# Patient Record
Sex: FEMALE | Race: WHITE | NOT HISPANIC OR LATINO | Employment: OTHER | URBAN - METROPOLITAN AREA
[De-identification: names, ages, dates, MRNs, and addresses within clinical notes are randomized per-mention and may not be internally consistent; named-entity substitution may affect disease eponyms.]

---

## 2017-01-31 ENCOUNTER — TRANSCRIBE ORDERS (OUTPATIENT)
Dept: ADMINISTRATIVE | Facility: HOSPITAL | Age: 72
End: 2017-01-31

## 2017-01-31 DIAGNOSIS — Z12.31 VISIT FOR SCREENING MAMMOGRAM: Primary | ICD-10-CM

## 2017-03-10 ENCOUNTER — HOSPITAL ENCOUNTER (OUTPATIENT)
Dept: MAMMOGRAPHY | Facility: HOSPITAL | Age: 72
Discharge: HOME | End: 2017-03-10
Attending: INTERNAL MEDICINE | Admitting: INTERNAL MEDICINE

## 2017-03-10 DIAGNOSIS — Z12.31 VISIT FOR SCREENING MAMMOGRAM: ICD-10-CM

## 2017-03-10 PROCEDURE — 77063 BREAST TOMOSYNTHESIS BI: CPT

## 2017-03-10 PROCEDURE — G0202 SCR MAMMO BI INCL CAD: HCPCS

## 2017-03-10 PROCEDURE — 77063 BREAST TOMOSYNTHESIS BI: CPT | Performed by: RADIOLOGY

## 2017-03-10 PROCEDURE — G0202 SCR MAMMO BI INCL CAD: HCPCS | Performed by: RADIOLOGY

## 2017-11-09 ENCOUNTER — TRANSCRIBE ORDERS (OUTPATIENT)
Dept: ADMINISTRATIVE | Facility: HOSPITAL | Age: 72
End: 2017-11-09

## 2017-11-09 DIAGNOSIS — N95.9 MENOPAUSAL AND POSTMENOPAUSAL DISORDER: Primary | ICD-10-CM

## 2017-11-29 ENCOUNTER — APPOINTMENT (OUTPATIENT)
Dept: BONE DENSITY | Facility: HOSPITAL | Age: 72
End: 2017-11-29
Attending: INTERNAL MEDICINE

## 2017-12-11 ENCOUNTER — HOSPITAL ENCOUNTER (OUTPATIENT)
Dept: BONE DENSITY | Facility: HOSPITAL | Age: 72
Discharge: HOME | End: 2017-12-11
Attending: INTERNAL MEDICINE | Admitting: INTERNAL MEDICINE

## 2017-12-11 DIAGNOSIS — N95.9 MENOPAUSAL AND POSTMENOPAUSAL DISORDER: ICD-10-CM

## 2017-12-11 PROCEDURE — 77080 DXA BONE DENSITY AXIAL: CPT

## 2018-02-21 ENCOUNTER — TRANSCRIBE ORDERS (OUTPATIENT)
Dept: ADMINISTRATIVE | Facility: HOSPITAL | Age: 73
End: 2018-02-21

## 2018-02-21 DIAGNOSIS — Z12.31 VISIT FOR SCREENING MAMMOGRAM: Primary | ICD-10-CM

## 2018-03-07 ENCOUNTER — APPOINTMENT (OUTPATIENT)
Dept: MAMMOGRAPHY | Facility: HOSPITAL | Age: 73
End: 2018-03-07

## 2018-03-08 ENCOUNTER — APPOINTMENT (OUTPATIENT)
Dept: MAMMOGRAPHY | Facility: HOSPITAL | Age: 73
End: 2018-03-08

## 2018-05-10 ENCOUNTER — HOSPITAL ENCOUNTER (OUTPATIENT)
Dept: MAMMOGRAPHY | Facility: HOSPITAL | Age: 73
Discharge: HOME | End: 2018-05-10
Admitting: NURSE PRACTITIONER

## 2018-05-10 DIAGNOSIS — Z12.31 VISIT FOR SCREENING MAMMOGRAM: ICD-10-CM

## 2018-05-10 PROCEDURE — 77067 SCR MAMMO BI INCL CAD: CPT

## 2018-05-10 PROCEDURE — 77063 BREAST TOMOSYNTHESIS BI: CPT | Performed by: RADIOLOGY

## 2018-05-10 PROCEDURE — 77063 BREAST TOMOSYNTHESIS BI: CPT

## 2018-05-10 PROCEDURE — 77067 SCR MAMMO BI INCL CAD: CPT | Performed by: RADIOLOGY

## 2019-01-15 RX ORDER — LISINOPRIL AND HYDROCHLOROTHIAZIDE 25; 20 MG/1; MG/1
TABLET ORAL
Qty: 90 TABLET | Refills: 0 | Status: SHIPPED | OUTPATIENT
Start: 2019-01-15 | End: 2019-03-11 | Stop reason: SDUPTHER

## 2019-01-24 PROBLEM — N95.9 MENOPAUSAL AND POSTMENOPAUSAL DISORDER: Status: ACTIVE | Noted: 2019-01-24

## 2019-01-24 PROBLEM — M15.0 PRIMARY OSTEOARTHRITIS INVOLVING MULTIPLE JOINTS: Status: ACTIVE | Noted: 2019-01-24

## 2019-01-24 PROBLEM — K64.9 HEMORRHOIDS: Status: ACTIVE | Noted: 2019-01-24

## 2019-01-24 PROBLEM — E78.00 HYPERCHOLESTEROLEMIA: Status: ACTIVE | Noted: 2019-01-24

## 2019-01-24 PROBLEM — G89.29 CHRONIC RIGHT-SIDED THORACIC BACK PAIN: Status: ACTIVE | Noted: 2019-01-24

## 2019-01-24 PROBLEM — M15.9 PRIMARY OSTEOARTHRITIS INVOLVING MULTIPLE JOINTS: Status: ACTIVE | Noted: 2019-01-24

## 2019-01-24 PROBLEM — H35.039 HYPERTENSIVE RETINOPATHY: Status: ACTIVE | Noted: 2019-01-24

## 2019-01-24 PROBLEM — R53.83 FATIGUE: Status: ACTIVE | Noted: 2019-01-24

## 2019-01-24 PROBLEM — I10 BENIGN ESSENTIAL HYPERTENSION: Status: ACTIVE | Noted: 2019-01-24

## 2019-01-24 PROBLEM — M85.89 OSTEOPENIA OF MULTIPLE SITES: Status: ACTIVE | Noted: 2019-01-24

## 2019-01-24 PROBLEM — E66.3 OVERWEIGHT (BMI 25.0-29.9): Status: ACTIVE | Noted: 2019-01-24

## 2019-01-24 PROBLEM — M54.6 CHRONIC RIGHT-SIDED THORACIC BACK PAIN: Status: ACTIVE | Noted: 2019-01-24

## 2019-01-24 PROBLEM — E55.9 VITAMIN D DEFICIENCY: Status: ACTIVE | Noted: 2019-01-24

## 2019-03-11 RX ORDER — LISINOPRIL AND HYDROCHLOROTHIAZIDE 25; 20 MG/1; MG/1
TABLET ORAL
Qty: 90 TABLET | Refills: 0 | Status: SHIPPED | OUTPATIENT
Start: 2019-03-11 | End: 2019-07-10 | Stop reason: SDUPTHER

## 2019-03-22 RX ORDER — METOPROLOL SUCCINATE 100 MG/1
TABLET, EXTENDED RELEASE ORAL
Qty: 30 TABLET | Refills: 5 | Status: SHIPPED | OUTPATIENT
Start: 2019-03-22 | End: 2019-09-24 | Stop reason: SDUPTHER

## 2019-03-22 RX ORDER — DOXAZOSIN MESYLATE 4 MG/1
TABLET ORAL
Qty: 30 TABLET | Refills: 5 | Status: SHIPPED | OUTPATIENT
Start: 2019-03-22 | End: 2019-09-20 | Stop reason: SDUPTHER

## 2019-03-26 RX ORDER — LISINOPRIL 20 MG/1
TABLET ORAL
Qty: 90 TABLET | Refills: 1 | Status: SHIPPED | OUTPATIENT
Start: 2019-03-26 | End: 2019-10-21 | Stop reason: SDUPTHER

## 2019-04-10 RX ORDER — PRAVASTATIN SODIUM 20 MG
TABLET ORAL
Qty: 30 TABLET | Refills: 3 | Status: SHIPPED | OUTPATIENT
Start: 2019-04-10 | End: 2019-07-12 | Stop reason: SDUPTHER

## 2019-04-23 ENCOUNTER — LAB (OUTPATIENT)
Dept: LAB | Facility: HOSPITAL | Age: 74
End: 2019-04-23

## 2019-04-23 ENCOUNTER — OFFICE VISIT (OUTPATIENT)
Dept: INTERNAL MEDICINE | Facility: CLINIC | Age: 74
End: 2019-04-23

## 2019-04-23 VITALS
HEART RATE: 68 BPM | DIASTOLIC BLOOD PRESSURE: 74 MMHG | BODY MASS INDEX: 29.44 KG/M2 | SYSTOLIC BLOOD PRESSURE: 120 MMHG | HEIGHT: 62 IN | WEIGHT: 160 LBS

## 2019-04-23 DIAGNOSIS — E78.00 HYPERCHOLESTEROLEMIA: ICD-10-CM

## 2019-04-23 DIAGNOSIS — I10 BENIGN ESSENTIAL HYPERTENSION: ICD-10-CM

## 2019-04-23 DIAGNOSIS — E55.9 VITAMIN D DEFICIENCY: ICD-10-CM

## 2019-04-23 DIAGNOSIS — E66.3 OVERWEIGHT (BMI 25.0-29.9): ICD-10-CM

## 2019-04-23 DIAGNOSIS — M85.89 OSTEOPENIA OF MULTIPLE SITES: ICD-10-CM

## 2019-04-23 DIAGNOSIS — Z00.00 MEDICARE ANNUAL WELLNESS VISIT, SUBSEQUENT: Primary | ICD-10-CM

## 2019-04-23 DIAGNOSIS — K64.9 HEMORRHOIDS, UNSPECIFIED HEMORRHOID TYPE: ICD-10-CM

## 2019-04-23 DIAGNOSIS — M15.9 PRIMARY OSTEOARTHRITIS INVOLVING MULTIPLE JOINTS: ICD-10-CM

## 2019-04-23 PROBLEM — G89.29 CHRONIC RIGHT-SIDED THORACIC BACK PAIN: Status: RESOLVED | Noted: 2019-01-24 | Resolved: 2019-04-23

## 2019-04-23 PROBLEM — R53.83 FATIGUE: Status: RESOLVED | Noted: 2019-01-24 | Resolved: 2019-04-23

## 2019-04-23 PROBLEM — M54.6 CHRONIC RIGHT-SIDED THORACIC BACK PAIN: Status: RESOLVED | Noted: 2019-01-24 | Resolved: 2019-04-23

## 2019-04-23 LAB
25(OH)D3 SERPL-MCNC: 26.6 NG/ML (ref 30–100)
ALBUMIN SERPL-MCNC: 4.4 G/DL (ref 3.5–5.2)
ALBUMIN UR-MCNC: <1.2 MG/L
ALBUMIN/GLOB SERPL: 1.6 G/DL
ALP SERPL-CCNC: 61 U/L (ref 39–117)
ALT SERPL W P-5'-P-CCNC: 14 U/L (ref 1–33)
ANION GAP SERPL CALCULATED.3IONS-SCNC: 14.3 MMOL/L
AST SERPL-CCNC: 18 U/L (ref 1–32)
BACTERIA UR QL AUTO: ABNORMAL /HPF
BASOPHILS # BLD AUTO: 0.05 10*3/MM3 (ref 0–0.2)
BASOPHILS NFR BLD AUTO: 0.9 % (ref 0–1.5)
BILIRUB SERPL-MCNC: 0.9 MG/DL (ref 0.2–1.2)
BILIRUB UR QL STRIP: NEGATIVE
BUN BLD-MCNC: 16 MG/DL (ref 8–23)
BUN/CREAT SERPL: 17 (ref 7–25)
CALCIUM SPEC-SCNC: 9.4 MG/DL (ref 8.6–10.5)
CHLORIDE SERPL-SCNC: 101 MMOL/L (ref 98–107)
CHOLEST SERPL-MCNC: 157 MG/DL (ref 0–200)
CLARITY UR: CLEAR
CO2 SERPL-SCNC: 26.7 MMOL/L (ref 22–29)
COLOR UR: YELLOW
CREAT BLD-MCNC: 0.94 MG/DL (ref 0.57–1)
CREAT UR-MCNC: 162.9 MG/DL
DEPRECATED RDW RBC AUTO: 43.7 FL (ref 37–54)
EOSINOPHIL # BLD AUTO: 0.19 10*3/MM3 (ref 0–0.4)
EOSINOPHIL NFR BLD AUTO: 3.2 % (ref 0.3–6.2)
ERYTHROCYTE [DISTWIDTH] IN BLOOD BY AUTOMATED COUNT: 12.8 % (ref 12.3–15.4)
GFR SERPL CREATININE-BSD FRML MDRD: 58 ML/MIN/1.73
GLOBULIN UR ELPH-MCNC: 2.8 GM/DL
GLUCOSE BLD-MCNC: 97 MG/DL (ref 65–99)
GLUCOSE UR STRIP-MCNC: NEGATIVE MG/DL
HCT VFR BLD AUTO: 40.5 % (ref 34–46.6)
HDLC SERPL-MCNC: 63 MG/DL (ref 40–60)
HGB BLD-MCNC: 12.8 G/DL (ref 12–15.9)
HGB UR QL STRIP.AUTO: NEGATIVE
HYALINE CASTS UR QL AUTO: ABNORMAL /LPF
IMM GRANULOCYTES # BLD AUTO: 0.02 10*3/MM3 (ref 0–0.05)
IMM GRANULOCYTES NFR BLD AUTO: 0.3 % (ref 0–0.5)
KETONES UR QL STRIP: NEGATIVE
LDLC SERPL CALC-MCNC: 80 MG/DL (ref 0–100)
LDLC/HDLC SERPL: 1.26 {RATIO}
LEUKOCYTE ESTERASE UR QL STRIP.AUTO: ABNORMAL
LYMPHOCYTES # BLD AUTO: 1.33 10*3/MM3 (ref 0.7–3.1)
LYMPHOCYTES NFR BLD AUTO: 22.7 % (ref 19.6–45.3)
MCH RBC QN AUTO: 29.3 PG (ref 26.6–33)
MCHC RBC AUTO-ENTMCNC: 31.6 G/DL (ref 31.5–35.7)
MCV RBC AUTO: 92.7 FL (ref 79–97)
MICROALBUMIN/CREAT UR: NORMAL MG/G
MONOCYTES # BLD AUTO: 0.46 10*3/MM3 (ref 0.1–0.9)
MONOCYTES NFR BLD AUTO: 7.8 % (ref 5–12)
NEUTROPHILS # BLD AUTO: 3.82 10*3/MM3 (ref 1.7–7)
NEUTROPHILS NFR BLD AUTO: 65.1 % (ref 42.7–76)
NITRITE UR QL STRIP: NEGATIVE
NRBC BLD AUTO-RTO: 0 /100 WBC (ref 0–0.2)
PH UR STRIP.AUTO: 6 [PH] (ref 5–8)
PLATELET # BLD AUTO: 238 10*3/MM3 (ref 140–450)
PMV BLD AUTO: 10.1 FL (ref 6–12)
POTASSIUM BLD-SCNC: 3.6 MMOL/L (ref 3.5–5.2)
PROT SERPL-MCNC: 7.2 G/DL (ref 6–8.5)
PROT UR QL STRIP: NEGATIVE
RBC # BLD AUTO: 4.37 10*6/MM3 (ref 3.77–5.28)
RBC # UR: ABNORMAL /HPF
REF LAB TEST METHOD: ABNORMAL
SODIUM BLD-SCNC: 142 MMOL/L (ref 136–145)
SP GR UR STRIP: 1.02 (ref 1–1.03)
SQUAMOUS #/AREA URNS HPF: ABNORMAL /HPF
TRIGL SERPL-MCNC: 72 MG/DL (ref 0–150)
TSH SERPL DL<=0.05 MIU/L-ACNC: 3.12 MIU/ML (ref 0.27–4.2)
UROBILINOGEN UR QL STRIP: ABNORMAL
VLDLC SERPL-MCNC: 14.4 MG/DL (ref 5–40)
WBC NRBC COR # BLD: 5.87 10*3/MM3 (ref 3.4–10.8)
WBC UR QL AUTO: ABNORMAL /HPF

## 2019-04-23 PROCEDURE — 81001 URINALYSIS AUTO W/SCOPE: CPT

## 2019-04-23 PROCEDURE — 82043 UR ALBUMIN QUANTITATIVE: CPT

## 2019-04-23 PROCEDURE — 36415 COLL VENOUS BLD VENIPUNCTURE: CPT

## 2019-04-23 PROCEDURE — 85025 COMPLETE CBC W/AUTO DIFF WBC: CPT

## 2019-04-23 PROCEDURE — 84443 ASSAY THYROID STIM HORMONE: CPT

## 2019-04-23 PROCEDURE — 80061 LIPID PANEL: CPT

## 2019-04-23 PROCEDURE — 82570 ASSAY OF URINE CREATININE: CPT

## 2019-04-23 PROCEDURE — 80053 COMPREHEN METABOLIC PANEL: CPT

## 2019-04-23 PROCEDURE — 82306 VITAMIN D 25 HYDROXY: CPT

## 2019-04-23 PROCEDURE — G0439 PPPS, SUBSEQ VISIT: HCPCS | Performed by: INTERNAL MEDICINE

## 2019-04-23 PROCEDURE — 93000 ELECTROCARDIOGRAM COMPLETE: CPT | Performed by: INTERNAL MEDICINE

## 2019-04-23 RX ORDER — MAGNESIUM OXIDE 400 MG/1
1 TABLET ORAL DAILY
COMMUNITY
Start: 2010-08-25

## 2019-04-23 RX ORDER — ASCORBIC ACID 500 MG
500 TABLET ORAL DAILY
COMMUNITY

## 2019-04-23 RX ORDER — QUINIDINE SULFATE 200 MG
TABLET ORAL
COMMUNITY
Start: 2016-10-14

## 2019-04-23 RX ORDER — POTASSIUM CITRATE 15 MEQ/1
1 TABLET, EXTENDED RELEASE ORAL EVERY 12 HOURS SCHEDULED
COMMUNITY
Start: 2018-05-01 | End: 2020-08-04

## 2019-04-23 NOTE — PROGRESS NOTES
QUICK REFERENCE INFORMATION:  The ABCs of the Annual Wellness Visit    Subsequent Medicare Wellness Visit    HEALTH RISK ASSESSMENT    1945    Recent Hospitalizations:  No hospitalization(s) within the last year..        Current Medical Providers:  Patient Care Team:  Sindy Kruger MD as PCP - General  Sindy Kruger MD as PCP - Family Medicine        Smoking Status:  Social History     Tobacco Use   Smoking Status Never Smoker   Smokeless Tobacco Never Used       Alcohol Consumption:  Social History     Substance and Sexual Activity   Alcohol Use Yes   • Frequency: 2-4 times a month   • Drinks per session: 1 or 2   • Binge frequency: Never       Depression Screen:   PHQ-2/PHQ-9 Depression Screening 4/23/2019   Little interest or pleasure in doing things 0   Feeling down, depressed, or hopeless 0   Total Score 0       Health Habits and Functional and Cognitive Screening:  Functional & Cognitive Status 4/23/2019   Do you have difficulty preparing food and eating? No   Do you have difficulty bathing yourself, getting dressed or grooming yourself? No   Do you have difficulty using the toilet? No   Do you have difficulty moving around from place to place? No   Do you have trouble with steps or getting out of a bed or a chair? No   In the past year have you fallen or experienced a near fall? No   Current Diet Well Balanced Diet   Dental Exam Up to date   Eye Exam Up to date   Exercise (times per week) 3 times per week   Current Exercise Activities Include Walking   Do you need help using the phone?  No   Are you deaf or do you have serious difficulty hearing?  No   Do you need help with transportation? No   Do you need help shopping? No   Do you need help preparing meals?  No   Do you need help with housework?  No   Do you need help with laundry? No   Do you need help taking your medications? No   Do you need help managing money? No   Do you ever drive or ride in a car without wearing a seat belt? No   Have  you felt unusual stress, anger or loneliness in the last month? No   Who do you live with? Spouse   If you need help, do you have trouble finding someone available to you? No   Have you been bothered in the last four weeks by sexual problems? No   Do you have difficulty concentrating, remembering or making decisions? No           Does the patient have evidence of cognitive impairment? No    Aspirin use counseling: Taking ASA appropriately as indicated      Recent Lab Results:  CMP:     HbA1c:  No results found for: HGBA1C  Microalbumin:  No results found for: MICROALBUR, POCMALB, POCCREAT  Lipid Panel  No results found for: CHOL, TRIG, HDL, LDL, AST, ALT    Visual Acuity:  No exam data present    Age-appropriate Screening Schedule:  Refer to the list below for future screening recommendations based on patient's age, sex and/or medical conditions. Orders for these recommended tests are listed in the plan section. The patient has been provided with a written plan.    Age appropriate preventive counseling done including age appropriate vaccines,regular  Mammogram and self breast exam,colonoscopy, regular dental visits, mental health, injury prevention such as wearing seat belt and preventing falls, healthy  nutrition, healthy weight, regular physical exercise. Alcohol use is moderate.  Tobacco history-none. Drug use-none.  STD's-not at risk.    She will get hepatitis A vaccine and the new shingles vaccine at her pharmacy.    Health Maintenance   Topic Date Due   • ZOSTER VACCINE (2 of 3) 06/13/2017   • LIPID PANEL  04/10/2019   • INFLUENZA VACCINE  08/01/2019   • MAMMOGRAM  05/10/2020   • DXA SCAN  12/11/2020   • TDAP/TD VACCINES (2 - Td) 09/02/2021   • COLONOSCOPY  04/30/2023   • PNEUMOCOCCAL VACCINES (65+ LOW/MEDIUM RISK)  Completed        Subjective   History of Present Illness    Payal Nix is a 74 y.o. female who presents for an Subsequent Wellness Visit and follow-up of chronic conditions including  hypertension, hyperlipidemia, osteoarthritis.    CHRONIC CONDITIONS:    Hypertension is well controlled on current medications.  She does avoid salt in the diet.      She takes her statin for hyperlipidemia every evening.  She does eat a low-fat diet.  She exercises and is very active with gardening.    She has some knee pain on and off but it does not keep her from exercising or gardening.  Tylenol helps some.  She rarely needs it.    She takes vitamin D regularly but has a hard time taking calcium tablets because they are large and chalky.  She does weightbearing exercises regularly and Gardens.    Hemorrhoids occasionally flareup when she is traveling a lot.  Proctofoam helps.  She is not usually have any trouble when she is at home.    The following portions of the patient's history were reviewed and updated as appropriate: allergies, current medications, past family history, past medical history, past social history, past surgical history and problem list.    Outpatient Medications Prior to Visit   Medication Sig Dispense Refill   • aspirin 81 MG tablet Take 1 tablet by mouth Daily.     • B Complex Vitamins (VITAMIN B COMPLEX PO) 1 po qd     • Calcium Citrate-Vitamin D 500-500 MG-UNIT chewable tablet 1 po 2 times a day     • Cholecalciferol (VITAMIN D-3) 5000 units tablet Take 1 tablet by mouth Every 12 (Twelve) Hours.     • Coenzyme Q10 (CO Q-10) 400 MG capsule 1 capsule every evening     • doxazosin (CARDURA) 4 MG tablet TAKE 1 TABLET BY MOUTH AT BEDTIME 30 tablet 5   • lisinopril (PRINIVIL,ZESTRIL) 20 MG tablet TAKE 1 TABLET BY MOUTH ONCE DAILY 90 tablet 1   • lisinopril-hydrochlorothiazide (PRINZIDE,ZESTORETIC) 20-25 MG per tablet TAKE 1 TABLET BY MOUTH ONCE DAILY 90 tablet 0   • magnesium oxide (MAG-OX) 400 MG tablet Take 1 tablet by mouth Daily.     • metoprolol succinate XL (TOPROL-XL) 100 MG 24 hr tablet TAKE 1 TABLET BY MOUTH ONCE DAILY AT BEDTIME 30 tablet 5   • Potassium Citrate ER 15 MEQ (1620 MG)  tablet controlled-release Take 1 tablet by mouth Every 12 (Twelve) Hours.     • pravastatin (PRAVACHOL) 20 MG tablet TAKE 1 TABLET BY MOUTH ONCE DAILY IN THE EVENING 30 tablet 3   • PROCTOFOAM HC 1-1 % rectal foam USE RECTALLY AS NEEDED 10 each 0   • vitamin C (ASCORBIC ACID) 500 MG tablet Take 500 mg by mouth Daily.       No facility-administered medications prior to visit.        Patient Active Problem List   Diagnosis   • Hemorrhoids   • Primary osteoarthritis involving multiple joints   • Hypercholesterolemia   • Vitamin D deficiency   • Hypertensive retinopathy   • Overweight (BMI 25.0-29.9)   • Benign essential hypertension   • Osteopenia of multiple sites   • Menopausal and postmenopausal disorder       Advance Care Planning:  Patient has an advance directive - a copy has not been provided. Have asked the patient to send this to us to add to record    Identification of Risk Factors:  Risk factors include: weight .    Review of Systems   Constitutional: Negative for chills, fatigue and fever.   HENT: Negative for congestion, ear pain, sinus pressure and sore throat.    Eyes: Negative for visual disturbance.   Respiratory: Negative for cough, chest tightness, shortness of breath and wheezing.    Cardiovascular: Negative for chest pain, palpitations and leg swelling.   Gastrointestinal: Negative for abdominal pain, blood in stool and constipation.   Endocrine: Negative for cold intolerance, heat intolerance and polydipsia.   Genitourinary: Negative for dysuria, frequency and hematuria.   Musculoskeletal: Negative for arthralgias, back pain and gait problem.   Skin: Negative for color change and rash.   Allergic/Immunologic: Negative for environmental allergies, food allergies and immunocompromised state.   Neurological: Negative for dizziness, speech difficulty, weakness and headaches.   Hematological: Negative for adenopathy. Does not bruise/bleed easily.   Psychiatric/Behavioral: Negative for confusion and  dysphoric mood. The patient is not nervous/anxious.        Compared to one year ago, the patient feels her physical health is the same.  Compared to one year ago, the patient feels her mental health is the same.    Objective     Physical Exam   Constitutional: She is oriented to person, place, and time. She appears well-developed and well-nourished. She appears overweight.   Eyes: Conjunctivae and EOM are normal. Pupils are equal, round, and reactive to light.   Neck: Normal range of motion. Neck supple. No thyromegaly present.   Cardiovascular: Normal rate, regular rhythm, normal heart sounds and intact distal pulses.   No murmur heard.  Pulmonary/Chest: Effort normal and breath sounds normal. She has no wheezes. Right breast exhibits no inverted nipple, no mass, no nipple discharge, no skin change and no tenderness. Left breast exhibits no inverted nipple, no mass, no nipple discharge, no skin change and no tenderness.   Abdominal: Soft. Bowel sounds are normal. She exhibits no distension and no mass. There is no tenderness.   Musculoskeletal: Normal range of motion. She exhibits no edema or tenderness.   Lymphadenopathy:        Head (right side): No submental, no submandibular, no preauricular and no posterior auricular adenopathy present.        Head (left side): No submental, no submandibular, no preauricular and no posterior auricular adenopathy present.     She has no cervical adenopathy.     She has no axillary adenopathy.   Neurological: She is alert and oriented to person, place, and time. She has normal strength. No cranial nerve deficit or sensory deficit. Coordination and gait normal.   Skin: Skin is warm and dry. No rash noted.   Psychiatric: She has a normal mood and affect. Her speech is normal and behavior is normal. Judgment and thought content normal. Cognition and memory are normal.   Nursing note and vitals reviewed.         .[facility]    ECG 12 Lead  Date/Time: 4/23/2019 11:32 AM  Performed by:  "Sindy Kruger MD  Authorized by: Sindy Kruger MD   Comparison: compared with previous ECG from 4/23/2018  Similar to previous ECG  Rhythm: sinus rhythm  Rate: normal  BPM: 61  ST Segments: ST segments normal  T Waves: T waves normal  QRS axis: normal    Clinical impression: normal ECG             Vitals:    04/23/19 1026   BP: 120/74   BP Location: Left arm   Patient Position: Sitting   Cuff Size: Adult   Pulse: 68   Weight: 72.6 kg (160 lb)   Height: 157.5 cm (62\")       Patient's Body mass index is 29.26 kg/m². BMI is above normal parameters. Recommendations include: educational material.      Assessment/Plan   Patient Self-Management and Personalized Health Advice  The patient has been provided with information about: diet and preventive services including:   · Fall Risk assessment done.  She is not at high risk for falls.    Visit Diagnoses:    ICD-10-CM ICD-9-CM   1. Medicare annual wellness visit, subsequent Z00.00 V70.0   2. Benign essential hypertension I10 401.1   3. Hypercholesterolemia E78.00 272.0   4. Vitamin D deficiency E55.9 268.9   5. Primary osteoarthritis involving multiple joints M15.0 715.09   6. Osteopenia of multiple sites M85.89 733.90   7. Overweight (BMI 25.0-29.9) E66.3 278.02   8. Hemorrhoids, unspecified hemorrhoid type K64.9 455.6       Orders Placed This Encounter   Procedures   • TSH     Standing Status:   Future     Number of Occurrences:   1   • Comprehensive Metabolic Panel     Standing Status:   Future     Number of Occurrences:   1   • Lipid Panel     Standing Status:   Future     Number of Occurrences:   1   • Microalbumin / Creatinine Urine Ratio - Urine, Clean Catch     Standing Status:   Future     Number of Occurrences:   1     Standing Expiration Date:   4/23/2020   • Vitamin D 25 Hydroxy     Standing Status:   Future     Number of Occurrences:   1     Standing Expiration Date:   4/23/2020   • ECG 12 Lead     This order was created via procedure documentation   • " Urinalysis With Microscopic - Urine, Clean Catch     Standing Status:   Future     Number of Occurrences:   1   • CBC & Differential     Standing Status:   Future     Number of Occurrences:   1     Order Specific Question:   Manual Differential     Answer:   No   For hypertension, continue taking lisinopril in the morning and lisinopril/hydrochlorothiazide and metoprolol and doxazosin in the evening.  Continue to avoid salt in the diet.  Continue regular exercise.    For hyperlipidemia, continue taking pravastatin every evening.  Continue to improve your low-fat and low sugar diet and get regular exercise.  Weigh yourself once a week to monitor your weight.  Continue eating small portion sizes.    For osteopenia, continue weightbearing exercises every day.  Continue taking vitamin D daily.  Take a calcium gummy once a day.    For joint pains, use Tylenol as needed.  Stay active.    For hemorrhoids, continue using the Proctofoam as needed.Serving Sizes  A serving size is a measured amount of food or drink, such as one slice of bread, that has an associated nutrient content. Knowing the serving size of a food or drink can help you determine how much of that food you should consume.  What is the size of one serving?  The size of one healthy serving depends on the food or drink. To determine a serving size, read the food label. If the food or drink does not have a food label, try to find serving size information online. Or, use the following to estimate the size of one adult serving:  Grain  1 slice bread. ½ bagel. ½ cup pasta.  Vegetable  ½ cup cooked or canned vegetables. 1 cup raw, leafy greens.  Fruit  ½ cup canned fruit. 1 medium fruit. ¼ cup dried fruit.  Meat and Other Protein Sources  1 oz meat, poultry, or fish. ¼ cup cooked beans. 1 egg. ¼ cup nuts or seeds. 1 Tbsp nut butter. ¼ cup tofu or tempeh. 2 Tbsp hummus.  Dairy  An individual container of yogurt (6-8 oz). 1 piece of cheese the size of your thumb (1  oz). 1 cup (8 oz) milk or milk alternative.  Fat  A piece the size of one dice. 1 tsp soft margarine. 1 Tbsp mayonnaise. 1 tsp vegetable oil. 1 Tbsp regular salad dressing. 2 Tbsp low-fat salad dressing.  How many servings should I eat from each food group each day?  The following are the suggested number of servings to try and have every day from each food group. You can also look at your eating throughout the week and aim for meeting these requirements on most days for overall healthy eating.  Grain  6-8 servings. Try to have half of your grains from whole grains, such as whole wheat bread, corn tortillas, oatmeal, brown rice, whole wheat pasta, and bulgur.  Vegetable  At least 2½-3 servings.  Fruit  2 servings.  Meat and Other Protein Foods  5-6 servings. Aim to have lean proteins, such as chicken, turkey, fish, beans, or tofu.  Dairy  3 servings. Choose low-fat or nonfat if you are trying to control your weight.  Fat  2-3 servings.  Is a serving the same thing as a portion?  No. A portion is the actual amount you eat, which may be more than one serving. Knowing the specific serving size of a food and the nutritional information that goes with it can help you make a healthy decision on what size portion to eat.  What are some tips to help me learn healthy serving sizes?  · Check food labels for serving sizes. Many foods that come as a single portion actually contain multiple servings.  · Determine the serving size of foods you commonly eat and figure out how large a portion you usually eat.  · Measure the number of servings that can be held by the bowls, glasses, cups, and plates you typically use. For example, pour your breakfast cereal into your regular bowl and then pour it into a measuring cup.  · For 1-2 days, measure the serving sizes of all the foods you eat.  · Practice estimating serving sizes and determining how big your portions should be.  This information is not intended to replace advice given to  you by your health care provider. Make sure you discuss any questions you have with your health care provider.  Document Released: 09/15/2004 Document Revised: 08/12/2017 Document Reviewed: 03/17/2015  [employer] Interactive Patient Education © 2018 [employer]      Outpatient Encounter Medications as of 4/23/2019   Medication Sig Dispense Refill   • aspirin 81 MG tablet Take 1 tablet by mouth Daily.     • B Complex Vitamins (VITAMIN B COMPLEX PO) 1 po qd     • Calcium Citrate-Vitamin D 500-500 MG-UNIT chewable tablet 1 po 2 times a day     • Cholecalciferol (VITAMIN D-3) 5000 units tablet Take 1 tablet by mouth Every 12 (Twelve) Hours.     • Coenzyme Q10 (CO Q-10) 400 MG capsule 1 capsule every evening     • doxazosin (CARDURA) 4 MG tablet TAKE 1 TABLET BY MOUTH AT BEDTIME 30 tablet 5   • lisinopril (PRINIVIL,ZESTRIL) 20 MG tablet TAKE 1 TABLET BY MOUTH ONCE DAILY 90 tablet 1   • lisinopril-hydrochlorothiazide (PRINZIDE,ZESTORETIC) 20-25 MG per tablet TAKE 1 TABLET BY MOUTH ONCE DAILY 90 tablet 0   • magnesium oxide (MAG-OX) 400 MG tablet Take 1 tablet by mouth Daily.     • metoprolol succinate XL (TOPROL-XL) 100 MG 24 hr tablet TAKE 1 TABLET BY MOUTH ONCE DAILY AT BEDTIME 30 tablet 5   • Potassium Citrate ER 15 MEQ (1620 MG) tablet controlled-release Take 1 tablet by mouth Every 12 (Twelve) Hours.     • pravastatin (PRAVACHOL) 20 MG tablet TAKE 1 TABLET BY MOUTH ONCE DAILY IN THE EVENING 30 tablet 3   • PROCTOFOAM HC 1-1 % rectal foam USE RECTALLY AS NEEDED 10 each 0   • vitamin C (ASCORBIC ACID) 500 MG tablet Take 500 mg by mouth Daily.     • [DISCONTINUED] lisinopril-hydrochlorothiazide (PRINZIDE,ZESTORETIC) 20-25 MG per tablet TAKE 1 TABLET BY MOUTH ONCE DAILY 90 tablet 0     No facility-administered encounter medications on file as of 4/23/2019.        Reviewed use of high risk medication in the elderly: yes  Reviewed for potential of harmful drug interactions in the elderly: yes    Follow Up:  Return in about  6 months (around 10/23/2019) for Recheck fasting blood pressure and cholesterol, labs today.     An After Visit Summary and PPPS with all of these plans were given to the patient.

## 2019-04-23 NOTE — PATIENT INSTRUCTIONS
For hypertension, continue taking lisinopril in the morning and lisinopril/hydrochlorothiazide and metoprolol and doxazosin in the evening.  Continue to avoid salt in the diet.  Continue regular exercise.    For hyperlipidemia, continue taking pravastatin every evening.  Continue to improve your low-fat and low sugar diet and get regular exercise.  Weigh yourself once a week to monitor your weight.  Continue eating small portion sizes.    For osteopenia, continue weightbearing exercises every day.  Continue taking vitamin D daily.  Take a calcium gummy once a day.    For joint pains, use Tylenol as needed.  Stay active.    For hemorrhoids, continue using the Proctofoam as needed.Serving Sizes  A serving size is a measured amount of food or drink, such as one slice of bread, that has an associated nutrient content. Knowing the serving size of a food or drink can help you determine how much of that food you should consume.  What is the size of one serving?  The size of one healthy serving depends on the food or drink. To determine a serving size, read the food label. If the food or drink does not have a food label, try to find serving size information online. Or, use the following to estimate the size of one adult serving:  Grain  1 slice bread. ½ bagel. ½ cup pasta.  Vegetable  ½ cup cooked or canned vegetables. 1 cup raw, leafy greens.  Fruit  ½ cup canned fruit. 1 medium fruit. ¼ cup dried fruit.  Meat and Other Protein Sources  1 oz meat, poultry, or fish. ¼ cup cooked beans. 1 egg. ¼ cup nuts or seeds. 1 Tbsp nut butter. ¼ cup tofu or tempeh. 2 Tbsp hummus.  Dairy  An individual container of yogurt (6-8 oz). 1 piece of cheese the size of your thumb (1 oz). 1 cup (8 oz) milk or milk alternative.  Fat  A piece the size of one dice. 1 tsp soft margarine. 1 Tbsp mayonnaise. 1 tsp vegetable oil. 1 Tbsp regular salad dressing. 2 Tbsp low-fat salad dressing.  How many servings should I eat from each food group each  day?  The following are the suggested number of servings to try and have every day from each food group. You can also look at your eating throughout the week and aim for meeting these requirements on most days for overall healthy eating.  Grain  6-8 servings. Try to have half of your grains from whole grains, such as whole wheat bread, corn tortillas, oatmeal, brown rice, whole wheat pasta, and bulgur.  Vegetable  At least 2½-3 servings.  Fruit  2 servings.  Meat and Other Protein Foods  5-6 servings. Aim to have lean proteins, such as chicken, turkey, fish, beans, or tofu.  Dairy  3 servings. Choose low-fat or nonfat if you are trying to control your weight.  Fat  2-3 servings.  Is a serving the same thing as a portion?  No. A portion is the actual amount you eat, which may be more than one serving. Knowing the specific serving size of a food and the nutritional information that goes with it can help you make a healthy decision on what size portion to eat.  What are some tips to help me learn healthy serving sizes?  · Check food labels for serving sizes. Many foods that come as a single portion actually contain multiple servings.  · Determine the serving size of foods you commonly eat and figure out how large a portion you usually eat.  · Measure the number of servings that can be held by the bowls, glasses, cups, and plates you typically use. For example, pour your breakfast cereal into your regular bowl and then pour it into a measuring cup.  · For 1-2 days, measure the serving sizes of all the foods you eat.  · Practice estimating serving sizes and determining how big your portions should be.  This information is not intended to replace advice given to you by your health care provider. Make sure you discuss any questions you have with your health care provider.  Document Released: 09/15/2004 Document Revised: 08/12/2017 Document Reviewed: 03/17/2015  [employer] Interactive Patient Education © 2018 [employer]

## 2019-06-18 ENCOUNTER — TRANSCRIBE ORDERS (OUTPATIENT)
Dept: ADMINISTRATIVE | Facility: HOSPITAL | Age: 74
End: 2019-06-18

## 2019-06-18 DIAGNOSIS — Z12.31 VISIT FOR SCREENING MAMMOGRAM: Primary | ICD-10-CM

## 2019-07-10 RX ORDER — LISINOPRIL AND HYDROCHLOROTHIAZIDE 25; 20 MG/1; MG/1
TABLET ORAL
Qty: 90 TABLET | Refills: 0 | Status: SHIPPED | OUTPATIENT
Start: 2019-07-10 | End: 2019-10-12 | Stop reason: SDUPTHER

## 2019-07-15 RX ORDER — PRAVASTATIN SODIUM 20 MG
TABLET ORAL
Qty: 90 TABLET | Refills: 1 | Status: SHIPPED | OUTPATIENT
Start: 2019-07-15 | End: 2020-02-10

## 2019-09-20 ENCOUNTER — HOSPITAL ENCOUNTER (OUTPATIENT)
Dept: MAMMOGRAPHY | Facility: HOSPITAL | Age: 74
Discharge: HOME | End: 2019-09-20
Admitting: INTERNAL MEDICINE

## 2019-09-20 DIAGNOSIS — Z12.31 VISIT FOR SCREENING MAMMOGRAM: ICD-10-CM

## 2019-09-20 PROCEDURE — 77063 BREAST TOMOSYNTHESIS BI: CPT

## 2019-09-20 PROCEDURE — 77067 SCR MAMMO BI INCL CAD: CPT

## 2019-09-20 PROCEDURE — 77063 BREAST TOMOSYNTHESIS BI: CPT | Performed by: RADIOLOGY

## 2019-09-20 PROCEDURE — 77067 SCR MAMMO BI INCL CAD: CPT | Performed by: RADIOLOGY

## 2019-09-20 RX ORDER — DOXAZOSIN MESYLATE 4 MG/1
TABLET ORAL
Qty: 90 TABLET | Refills: 1 | Status: SHIPPED | OUTPATIENT
Start: 2019-09-20 | End: 2020-03-20

## 2019-09-24 RX ORDER — METOPROLOL SUCCINATE 100 MG/1
TABLET, EXTENDED RELEASE ORAL
Qty: 30 TABLET | Refills: 5 | Status: SHIPPED | OUTPATIENT
Start: 2019-09-24 | End: 2020-03-20

## 2019-10-14 RX ORDER — LISINOPRIL AND HYDROCHLOROTHIAZIDE 25; 20 MG/1; MG/1
TABLET ORAL
Qty: 90 TABLET | Refills: 0 | Status: SHIPPED | OUTPATIENT
Start: 2019-10-14 | End: 2020-02-04

## 2019-10-21 RX ORDER — LISINOPRIL 20 MG/1
20 TABLET ORAL DAILY
Qty: 90 TABLET | Refills: 1 | Status: SHIPPED | OUTPATIENT
Start: 2019-10-21 | End: 2020-05-20

## 2019-11-05 ENCOUNTER — LAB (OUTPATIENT)
Dept: LAB | Facility: HOSPITAL | Age: 74
End: 2019-11-05

## 2019-11-05 ENCOUNTER — OFFICE VISIT (OUTPATIENT)
Dept: INTERNAL MEDICINE | Facility: CLINIC | Age: 74
End: 2019-11-05

## 2019-11-05 VITALS
BODY MASS INDEX: 29.26 KG/M2 | TEMPERATURE: 97.3 F | SYSTOLIC BLOOD PRESSURE: 148 MMHG | DIASTOLIC BLOOD PRESSURE: 88 MMHG | HEIGHT: 62 IN | WEIGHT: 159 LBS | HEART RATE: 64 BPM

## 2019-11-05 DIAGNOSIS — E78.00 HYPERCHOLESTEROLEMIA: ICD-10-CM

## 2019-11-05 DIAGNOSIS — I10 BENIGN ESSENTIAL HYPERTENSION: ICD-10-CM

## 2019-11-05 DIAGNOSIS — Z23 NEED FOR VACCINATION: ICD-10-CM

## 2019-11-05 DIAGNOSIS — E55.9 VITAMIN D DEFICIENCY: ICD-10-CM

## 2019-11-05 DIAGNOSIS — I10 BENIGN ESSENTIAL HYPERTENSION: Primary | ICD-10-CM

## 2019-11-05 DIAGNOSIS — H35.033 HYPERTENSIVE RETINOPATHY OF BOTH EYES: ICD-10-CM

## 2019-11-05 DIAGNOSIS — M15.9 PRIMARY OSTEOARTHRITIS INVOLVING MULTIPLE JOINTS: ICD-10-CM

## 2019-11-05 DIAGNOSIS — Z23 NEED FOR INFLUENZA VACCINATION: ICD-10-CM

## 2019-11-05 LAB
25(OH)D3 SERPL-MCNC: 64.7 NG/ML (ref 30–100)
ALBUMIN SERPL-MCNC: 4.5 G/DL (ref 3.5–5.2)
ALBUMIN UR-MCNC: 1.9 MG/DL
ALBUMIN/GLOB SERPL: 1.4 G/DL
ALP SERPL-CCNC: 69 U/L (ref 39–117)
ALT SERPL W P-5'-P-CCNC: 14 U/L (ref 1–33)
ANION GAP SERPL CALCULATED.3IONS-SCNC: 9.6 MMOL/L (ref 5–15)
AST SERPL-CCNC: 14 U/L (ref 1–32)
BILIRUB SERPL-MCNC: 0.6 MG/DL (ref 0.2–1.2)
BUN BLD-MCNC: 18 MG/DL (ref 8–23)
BUN/CREAT SERPL: 22.2 (ref 7–25)
CALCIUM SPEC-SCNC: 9.8 MG/DL (ref 8.6–10.5)
CHLORIDE SERPL-SCNC: 101 MMOL/L (ref 98–107)
CHOLEST SERPL-MCNC: 176 MG/DL (ref 0–200)
CO2 SERPL-SCNC: 32.4 MMOL/L (ref 22–29)
CREAT BLD-MCNC: 0.81 MG/DL (ref 0.57–1)
CREAT UR-MCNC: 192.3 MG/DL
GFR SERPL CREATININE-BSD FRML MDRD: 69 ML/MIN/1.73
GLOBULIN UR ELPH-MCNC: 3.2 GM/DL
GLUCOSE BLD-MCNC: 88 MG/DL (ref 65–99)
HDLC SERPL-MCNC: 61 MG/DL (ref 40–60)
LDLC SERPL CALC-MCNC: 91 MG/DL (ref 0–100)
LDLC/HDLC SERPL: 1.5 {RATIO}
MICROALBUMIN/CREAT UR: 9.9 MG/G
POTASSIUM BLD-SCNC: 3.5 MMOL/L (ref 3.5–5.2)
PROT SERPL-MCNC: 7.7 G/DL (ref 6–8.5)
SODIUM BLD-SCNC: 143 MMOL/L (ref 136–145)
TRIGL SERPL-MCNC: 118 MG/DL (ref 0–150)
VLDLC SERPL-MCNC: 23.6 MG/DL (ref 5–40)

## 2019-11-05 PROCEDURE — 80053 COMPREHEN METABOLIC PANEL: CPT

## 2019-11-05 PROCEDURE — 99214 OFFICE O/P EST MOD 30 MIN: CPT | Performed by: INTERNAL MEDICINE

## 2019-11-05 PROCEDURE — 90653 IIV ADJUVANT VACCINE IM: CPT | Performed by: INTERNAL MEDICINE

## 2019-11-05 PROCEDURE — 82570 ASSAY OF URINE CREATININE: CPT

## 2019-11-05 PROCEDURE — 80061 LIPID PANEL: CPT

## 2019-11-05 PROCEDURE — G0008 ADMIN INFLUENZA VIRUS VAC: HCPCS | Performed by: INTERNAL MEDICINE

## 2019-11-05 PROCEDURE — 82043 UR ALBUMIN QUANTITATIVE: CPT

## 2019-11-05 PROCEDURE — 82306 VITAMIN D 25 HYDROXY: CPT

## 2019-11-05 NOTE — PROGRESS NOTES
[facility]     Payal Nix  1945   [id number]      Patient Care Team:  Sindy Kruger MD as PCP - General  Sindy Kruger MD as PCP - Family Medicine    Chief Complaint;:   Chief Complaint   Patient presents with   • Hypertension     6 mo fasting lab follow up    • Osteoarthritis   • Flu Vaccine            HPI  Patient is a 74 y.o. female presents with f/u hypertension, hyperlipidemia, vitamin D    CHRONIC CONDITIONS  BP's at home around 130-140/80.  Rarely goes over 140.  She has been taking the lisinopril/hydrochlorothiazide tablet in the evenings and the plain lisinopril tablet in the mornings.  She has not seen a difference in blood pressures when morning and evening are compared.  No side effects with medications.    Usually eats a low-fat and low sugar diet.  She does walk and get some exercise.  She takes pravastatin every evening for hyperlipidemia.    She does take 5000 units of vitamin D twice a day.    Past Medical History:   Diagnosis Date   • Arthritis    • Cervical cancer (CMS/HCC)     Dr. Orr   • Cervical cancer (CMS/HCC)     in situ; Hysterectomy 1982   • Chronic right-sided thoracic back pain 1/24/2019   • Fatigue 1/24/2019   • Fibrocystic breast    • Hyperlipidemia    • Menopausal state        Past Surgical History:   Procedure Laterality Date   • BREAST CYST ASPIRATION     • D&C WITH CERVICAL CONIZATION  1981   • HYSTERECTOMY  1982    Ovaries intact. cervical cancer in situ       Family History   Problem Relation Age of Onset   • Stroke Mother    • Tuberculosis Maternal Aunt    • Tuberculosis Maternal Grandmother    • Breast cancer Neg Hx    • Endometrial cancer Neg Hx    • Ovarian cancer Neg Hx        Social History     Socioeconomic History   • Marital status:      Spouse name: Not on file   • Number of children: Not on file   • Years of education: Not on file   • Highest education level: Not on file   Tobacco Use   • Smoking status: Never Smoker   •  "Smokeless tobacco: Never Used   Substance and Sexual Activity   • Alcohol use: Yes     Frequency: 2-4 times a month     Drinks per session: 1 or 2     Binge frequency: Never     Comment: once in a while   • Drug use: No   • Sexual activity: Defer       Allergies   Allergen Reactions   • Amlodipine Besylate Other (See Comments)     edema  Other reaction(s): edema   • Benazepril Hcl Unknown (See Comments)   • Ciprofloxacin Other (See Comments)     Nausea and chills   • Ciprofloxacin Hcl Other (See Comments)     Nausea and chills   • Levofloxacin Nausea And Vomiting     Other reaction(s): nausea, vomiting   • Lotrel [Amlodipine Besy-Benazepril Hcl] Other (See Comments)     unknown       Review of Systems:     Review of Systems   Constitutional: Negative for chills, fatigue and fever.   HENT: Negative for congestion, ear pain and sinus pressure.    Respiratory: Negative for cough, chest tightness, shortness of breath and wheezing.    Cardiovascular: Negative for chest pain and palpitations.   Gastrointestinal: Negative for abdominal pain, blood in stool and constipation.   Genitourinary: Negative for dysuria and frequency.   Skin: Negative for color change and skin lesions.   Allergic/Immunologic: Negative for environmental allergies.   Neurological: Negative for dizziness, speech difficulty and headache.   Hematological: Negative for adenopathy. Does not bruise/bleed easily.   Psychiatric/Behavioral: Negative for decreased concentration. The patient is not nervous/anxious.        Vital Signs  Vitals:    11/05/19 1037   BP: 148/88   BP Location: Left arm   Patient Position: Sitting   Cuff Size: Adult   Pulse: 64   Temp: 97.3 °F (36.3 °C)   TempSrc: Temporal   Weight: 72.1 kg (159 lb)   Height: 157 cm (61.81\")   PainSc: 0-No pain     Body mass index is 29.26 kg/m².      Current Outpatient Medications:   •  aspirin 81 MG tablet, Take 1 tablet by mouth Daily., Disp: , Rfl:   •  B Complex Vitamins (VITAMIN B COMPLEX PO), 1 " po qd, Disp: , Rfl:   •  Calcium Citrate-Vitamin D 500-500 MG-UNIT chewable tablet, 1 po 2 times a day, Disp: , Rfl:   •  Cholecalciferol (VITAMIN D-3) 5000 units tablet, Take 1 tablet by mouth Every 12 (Twelve) Hours., Disp: , Rfl:   •  Coenzyme Q10 (CO Q-10) 400 MG capsule, 1 capsule every evening, Disp: , Rfl:   •  doxazosin (CARDURA) 4 MG tablet, TAKE 1 TABLET BY MOUTH EVERY DAY AT BEDTIME, Disp: 90 tablet, Rfl: 1  •  lisinopril (PRINIVIL,ZESTRIL) 20 MG tablet, Take 1 tablet by mouth Daily., Disp: 90 tablet, Rfl: 1  •  lisinopril-hydrochlorothiazide (PRINZIDE,ZESTORETIC) 20-25 MG per tablet, TAKE 1 TABLET BY MOUTH ONCE DAILY, Disp: 90 tablet, Rfl: 0  •  magnesium oxide (MAG-OX) 400 MG tablet, Take 1 tablet by mouth Daily., Disp: , Rfl:   •  metoprolol succinate XL (TOPROL-XL) 100 MG 24 hr tablet, TAKE 1 TABLET BY MOUTH ONCE DAILY AT BEDTIME, Disp: 30 tablet, Rfl: 5  •  Potassium Citrate ER 15 MEQ (1620 MG) tablet controlled-release, Take 1 tablet by mouth Every 12 (Twelve) Hours., Disp: , Rfl:   •  pravastatin (PRAVACHOL) 20 MG tablet, TAKE 1 TABLET BY MOUTH ONCE DAILY IN THE EVENING, Disp: 90 tablet, Rfl: 1  •  PROCTOFOAM HC 1-1 % rectal foam, USE RECTALLY AS NEEDED, Disp: 10 each, Rfl: 0  •  vitamin C (ASCORBIC ACID) 500 MG tablet, Take 500 mg by mouth Daily., Disp: , Rfl:     Physical Exam:    Physical Exam   Constitutional: She is oriented to person, place, and time. She appears well-developed and well-nourished.   HENT:   Head: Normocephalic.   Eyes: Conjunctivae and EOM are normal. Pupils are equal, round, and reactive to light.   Neck: Normal range of motion. Neck supple. No thyromegaly present.   Cardiovascular: Normal rate, regular rhythm, normal heart sounds and intact distal pulses.   Pulmonary/Chest: Effort normal and breath sounds normal.   Musculoskeletal: Normal range of motion. She exhibits no edema.   Lymphadenopathy:     She has no cervical adenopathy.   Neurological: She is alert and oriented  to person, place, and time.   Psychiatric: She has a normal mood and affect. Thought content normal.   Nursing note and vitals reviewed.       ACE III MINI        Results Review:    None    CMP:  Lab Results   Component Value Date    BUN 16 04/23/2019    CREATININE 0.94 04/23/2019    EGFRIFNONA 58 (L) 04/23/2019    BCR 17.0 04/23/2019     04/23/2019    K 3.6 04/23/2019    CO2 26.7 04/23/2019    CALCIUM 9.4 04/23/2019    ALBUMIN 4.40 04/23/2019    BILITOT 0.9 04/23/2019    ALKPHOS 61 04/23/2019    AST 18 04/23/2019    ALT 14 04/23/2019     HbA1c:  No results found for: HGBA1C  Microalbumin:  Lab Results   Component Value Date    MICROALBUR <1.2 04/23/2019     Lipid Panel  Lab Results   Component Value Date    CHOL 157 04/23/2019    TRIG 72 04/23/2019    HDL 63 (H) 04/23/2019    LDL 80 04/23/2019    AST 18 04/23/2019    ALT 14 04/23/2019       Medication Review: Medications reviewed and noted    Problem List Items Addressed This Visit        Cardiovascular and Mediastinum    Hypercholesterolemia    Overview     11/5/2019 Sindy Kruger MD     Continue pravastatin every evening.Continue low-fat diet and regular exercise.               Relevant Medications    pravastatin (PRAVACHOL) 20 MG tablet    Other Relevant Orders    Lipid Panel    Benign essential hypertension - Primary    Overview     11/5/2019 Sindy Kruger MD    Continue doxazosin every evening, metoprolol every evening.  Switch lisinopril/hydrochlorothiazide  tablet to morning and lisinopril tablet to evening.  Continue to avoid salt in the diet and avoid sodas.    Continue to check blood pressures a few times a week.  Write them down to see if it looks like the blood pressure runs higher in the mornings or the evenings or if they are pretty consistent.  She will let us know in a few weeks how the blood pressures are running.    If blood pressures do not improve, will switch hydrochlorothiazide to chlorthalidone.  Blood pressure goal is less than  135/80 most of the time.         Relevant Medications    doxazosin (CARDURA) 4 MG tablet    metoprolol succinate XL (TOPROL-XL) 100 MG 24 hr tablet    lisinopril-hydrochlorothiazide (PRINZIDE,ZESTORETIC) 20-25 MG per tablet    lisinopril (PRINIVIL,ZESTRIL) 20 MG tablet    Other Relevant Orders    Comprehensive Metabolic Panel    Microalbumin / Creatinine Urine Ratio - Urine, Clean Catch       Digestive    Vitamin D deficiency    Overview     11/5/2019 Sindy Kruger MD    Check blood level today.         Relevant Orders    Vitamin D 25 Hydroxy       Musculoskeletal and Integument    Primary osteoarthritis involving multiple joints    Overview     11/5/2019 Sindy Kruger MD    Stay physically active.  Use a cold pack as needed for aching or swollen joints.  May use Tylenol as needed for pain.            Other    Hypertensive retinopathy    Overview     11/5/2019 Sindy Kruger MD    Continue to improve blood pressure control.  Continue regular f/u with ophthalmologist.           Other Visit Diagnoses     Need for influenza vaccination        Relevant Orders    Fluad Tri 65yr+ (Completed)    Need for vaccination        She will get hepatitis A vaccine #2 and new shingles vaccine at pharmacy.            Diagnosis Plan   1. Benign essential hypertension  Comprehensive Metabolic Panel    Microalbumin / Creatinine Urine Ratio - Urine, Clean Catch   2. Hypercholesterolemia  Lipid Panel   3. Vitamin D deficiency  Vitamin D 25 Hydroxy   4. Hypertensive retinopathy of both eyes     5. Need for influenza vaccination  Fluad Tri 65yr+   6. Need for vaccination      She will get hepatitis A vaccine #2 and new shingles vaccine at pharmacy.   7. Primary osteoarthritis involving multiple joints         There are no Patient Instructions on file for this visit.    Plan of care reviewed with patient at the conclusion of today's visit. Education was provided regarding diagnosis, management, and any prescribed or recommended  OTC medications.Patient verbalizes understanding of and agreement with management plan.         Sindy Kruger MD

## 2019-11-15 RX ORDER — ACETAMINOPHEN 160 MG
2000 TABLET,DISINTEGRATING ORAL DAILY
Qty: 30 CAPSULE | Refills: 11
Start: 2019-11-15 | End: 2020-11-14

## 2020-02-04 RX ORDER — LISINOPRIL AND HYDROCHLOROTHIAZIDE 25; 20 MG/1; MG/1
TABLET ORAL
Qty: 90 TABLET | Refills: 3 | Status: SHIPPED | OUTPATIENT
Start: 2020-02-04 | End: 2021-02-04 | Stop reason: SDUPTHER

## 2020-02-10 RX ORDER — PRAVASTATIN SODIUM 20 MG
TABLET ORAL
Qty: 90 TABLET | Refills: 0 | Status: SHIPPED | OUTPATIENT
Start: 2020-02-10 | End: 2020-05-11

## 2020-03-20 RX ORDER — METOPROLOL SUCCINATE 100 MG/1
TABLET, EXTENDED RELEASE ORAL
Qty: 30 TABLET | Refills: 0 | Status: SHIPPED | OUTPATIENT
Start: 2020-03-20 | End: 2020-04-22

## 2020-03-20 RX ORDER — DOXAZOSIN MESYLATE 4 MG/1
TABLET ORAL
Qty: 90 TABLET | Refills: 0 | Status: SHIPPED | OUTPATIENT
Start: 2020-03-20 | End: 2020-06-22

## 2020-04-17 ENCOUNTER — TELEPHONE (OUTPATIENT)
Dept: INTERNAL MEDICINE | Facility: CLINIC | Age: 75
End: 2020-04-17

## 2020-04-17 NOTE — TELEPHONE ENCOUNTER
Achy for about a week, got temp this week 100.7 on Wed, had chills & headache with it.  No cough, no SOB and is feeling a bit better today. Nothing has tasted well this week.  She is wondering if she should get tested.  She has not been out of the house since April 1st.  Todays temp is 98.6.    Please advise.

## 2020-04-17 NOTE — TELEPHONE ENCOUNTER
She could have a mild case of COVID-19.  But as long as she does not feel short of breath or feel that she is getting worse, self quarantine at home is probably the best route to go.  She may take Tylenol as needed for fever chills and headache.

## 2020-04-22 RX ORDER — METOPROLOL SUCCINATE 100 MG/1
TABLET, EXTENDED RELEASE ORAL
Qty: 90 TABLET | Refills: 1 | Status: SHIPPED | OUTPATIENT
Start: 2020-04-22 | End: 2020-08-04 | Stop reason: SDUPTHER

## 2020-05-11 RX ORDER — PRAVASTATIN SODIUM 20 MG
TABLET ORAL
Qty: 90 TABLET | Refills: 0 | Status: SHIPPED | OUTPATIENT
Start: 2020-05-11 | End: 2020-08-06

## 2020-05-13 PROCEDURE — 87086 URINE CULTURE/COLONY COUNT: CPT | Performed by: EMERGENCY MEDICINE

## 2020-05-14 ENCOUNTER — TELEPHONE (OUTPATIENT)
Dept: URGENT CARE | Facility: CLINIC | Age: 75
End: 2020-05-14

## 2020-05-20 RX ORDER — LISINOPRIL 20 MG/1
TABLET ORAL
Qty: 90 TABLET | Refills: 0 | Status: SHIPPED | OUTPATIENT
Start: 2020-05-20 | End: 2020-05-26

## 2020-05-26 RX ORDER — LISINOPRIL 20 MG/1
TABLET ORAL
Qty: 90 TABLET | Refills: 2 | Status: SHIPPED | OUTPATIENT
Start: 2020-05-26 | End: 2021-02-04 | Stop reason: SDUPTHER

## 2020-06-22 RX ORDER — DOXAZOSIN MESYLATE 4 MG/1
TABLET ORAL
Qty: 90 TABLET | Refills: 0 | Status: SHIPPED | OUTPATIENT
Start: 2020-06-22 | End: 2020-06-24

## 2020-06-24 RX ORDER — DOXAZOSIN MESYLATE 4 MG/1
TABLET ORAL
Qty: 90 TABLET | Refills: 0 | Status: SHIPPED | OUTPATIENT
Start: 2020-06-24 | End: 2020-12-17

## 2020-08-04 ENCOUNTER — OFFICE VISIT (OUTPATIENT)
Dept: INTERNAL MEDICINE | Facility: CLINIC | Age: 75
End: 2020-08-04

## 2020-08-04 ENCOUNTER — LAB (OUTPATIENT)
Dept: LAB | Facility: HOSPITAL | Age: 75
End: 2020-08-04

## 2020-08-04 VITALS
DIASTOLIC BLOOD PRESSURE: 86 MMHG | BODY MASS INDEX: 28.52 KG/M2 | HEIGHT: 62 IN | SYSTOLIC BLOOD PRESSURE: 138 MMHG | HEART RATE: 64 BPM | TEMPERATURE: 97.1 F | WEIGHT: 155 LBS

## 2020-08-04 DIAGNOSIS — I10 BENIGN ESSENTIAL HYPERTENSION: ICD-10-CM

## 2020-08-04 DIAGNOSIS — E55.9 VITAMIN D DEFICIENCY: ICD-10-CM

## 2020-08-04 DIAGNOSIS — Z00.00 ANNUAL PHYSICAL EXAM: ICD-10-CM

## 2020-08-04 DIAGNOSIS — E78.00 HYPERCHOLESTEROLEMIA: ICD-10-CM

## 2020-08-04 DIAGNOSIS — Z00.00 MEDICARE ANNUAL WELLNESS VISIT, SUBSEQUENT: Primary | ICD-10-CM

## 2020-08-04 DIAGNOSIS — M85.89 OSTEOPENIA OF MULTIPLE SITES: ICD-10-CM

## 2020-08-04 DIAGNOSIS — Z12.31 SCREENING MAMMOGRAM, ENCOUNTER FOR: ICD-10-CM

## 2020-08-04 DIAGNOSIS — N95.9 MENOPAUSAL AND POSTMENOPAUSAL DISORDER: ICD-10-CM

## 2020-08-04 LAB
BACTERIA UR QL AUTO: ABNORMAL /HPF
BASOPHILS # BLD AUTO: 0.04 10*3/MM3 (ref 0–0.2)
BASOPHILS NFR BLD AUTO: 0.7 % (ref 0–1.5)
BILIRUB UR QL STRIP: NEGATIVE
CLARITY UR: CLEAR
COLOR UR: YELLOW
DEPRECATED RDW RBC AUTO: 43.2 FL (ref 37–54)
EOSINOPHIL # BLD AUTO: 0.18 10*3/MM3 (ref 0–0.4)
EOSINOPHIL NFR BLD AUTO: 3.1 % (ref 0.3–6.2)
ERYTHROCYTE [DISTWIDTH] IN BLOOD BY AUTOMATED COUNT: 13 % (ref 12.3–15.4)
GLUCOSE UR STRIP-MCNC: NEGATIVE MG/DL
HCT VFR BLD AUTO: 41.4 % (ref 34–46.6)
HGB BLD-MCNC: 13.5 G/DL (ref 12–15.9)
HGB UR QL STRIP.AUTO: NEGATIVE
HYALINE CASTS UR QL AUTO: ABNORMAL /LPF
IMM GRANULOCYTES # BLD AUTO: 0.01 10*3/MM3 (ref 0–0.05)
IMM GRANULOCYTES NFR BLD AUTO: 0.2 % (ref 0–0.5)
KETONES UR QL STRIP: NEGATIVE
LEUKOCYTE ESTERASE UR QL STRIP.AUTO: ABNORMAL
LYMPHOCYTES # BLD AUTO: 1.2 10*3/MM3 (ref 0.7–3.1)
LYMPHOCYTES NFR BLD AUTO: 20.9 % (ref 19.6–45.3)
MCH RBC QN AUTO: 29.8 PG (ref 26.6–33)
MCHC RBC AUTO-ENTMCNC: 32.6 G/DL (ref 31.5–35.7)
MCV RBC AUTO: 91.4 FL (ref 79–97)
MONOCYTES # BLD AUTO: 0.4 10*3/MM3 (ref 0.1–0.9)
MONOCYTES NFR BLD AUTO: 7 % (ref 5–12)
NEUTROPHILS NFR BLD AUTO: 3.92 10*3/MM3 (ref 1.7–7)
NEUTROPHILS NFR BLD AUTO: 68.1 % (ref 42.7–76)
NITRITE UR QL STRIP: NEGATIVE
NRBC BLD AUTO-RTO: 0 /100 WBC (ref 0–0.2)
PH UR STRIP.AUTO: 5.5 [PH] (ref 5–8)
PLATELET # BLD AUTO: 255 10*3/MM3 (ref 140–450)
PMV BLD AUTO: 10.4 FL (ref 6–12)
PROT UR QL STRIP: NEGATIVE
RBC # BLD AUTO: 4.53 10*6/MM3 (ref 3.77–5.28)
RBC # UR: ABNORMAL /HPF
REF LAB TEST METHOD: ABNORMAL
SP GR UR STRIP: 1.01 (ref 1–1.03)
SQUAMOUS #/AREA URNS HPF: ABNORMAL /HPF
UROBILINOGEN UR QL STRIP: ABNORMAL
WBC # BLD AUTO: 5.75 10*3/MM3 (ref 3.4–10.8)
WBC UR QL AUTO: ABNORMAL /HPF

## 2020-08-04 PROCEDURE — 84443 ASSAY THYROID STIM HORMONE: CPT

## 2020-08-04 PROCEDURE — 82570 ASSAY OF URINE CREATININE: CPT

## 2020-08-04 PROCEDURE — 82043 UR ALBUMIN QUANTITATIVE: CPT

## 2020-08-04 PROCEDURE — 80053 COMPREHEN METABOLIC PANEL: CPT

## 2020-08-04 PROCEDURE — 99397 PER PM REEVAL EST PAT 65+ YR: CPT | Performed by: INTERNAL MEDICINE

## 2020-08-04 PROCEDURE — 80061 LIPID PANEL: CPT

## 2020-08-04 PROCEDURE — G0439 PPPS, SUBSEQ VISIT: HCPCS | Performed by: INTERNAL MEDICINE

## 2020-08-04 PROCEDURE — 82306 VITAMIN D 25 HYDROXY: CPT

## 2020-08-04 PROCEDURE — 93000 ELECTROCARDIOGRAM COMPLETE: CPT | Performed by: INTERNAL MEDICINE

## 2020-08-04 PROCEDURE — 81001 URINALYSIS AUTO W/SCOPE: CPT

## 2020-08-04 PROCEDURE — 87086 URINE CULTURE/COLONY COUNT: CPT

## 2020-08-04 PROCEDURE — 85025 COMPLETE CBC W/AUTO DIFF WBC: CPT

## 2020-08-04 RX ORDER — METOPROLOL SUCCINATE 100 MG/1
150 TABLET, EXTENDED RELEASE ORAL
Qty: 146 TABLET | Refills: 1 | Status: SHIPPED | OUTPATIENT
Start: 2020-08-04 | End: 2020-10-19

## 2020-08-04 NOTE — PATIENT INSTRUCTIONS
Patient Instructions   Problem List Items Addressed This Visit        Cardiovascular and Mediastinum    Hypercholesterolemia    Overview     8/4/2020 Sindy Kruger MD     Continue pravastatin every evening.Continue low-fat diet and regular exercise.               Relevant Medications    pravastatin (PRAVACHOL) 20 MG tablet    Other Relevant Orders    CBC & Differential    Comprehensive Metabolic Panel    Lipid Panel    TSH    Benign essential hypertension    Overview     8/4/2020 Sindy Kruger MD    Increase metoprolol to 1-1/2 tablets (150 mg) every evening.  Continue doxazosin every evening.  Continue lisinopril/hydrochlorothiazide 20/25 mg every morning and lisinopril tablet 20 mg every evening.  Continue to avoid salt in the diet and avoid sodas.    She will let us know in a few weeks how the blood pressures are running.    If blood pressures do not improve, we could switch hydrochlorothiazide to chlorthalidone or try hydralazine.  Blood pressure goal is less than 135/80 most of the time.         Relevant Medications    lisinopril-hydrochlorothiazide (PRINZIDE,ZESTORETIC) 20-25 MG per tablet    lisinopril (PRINIVIL,ZESTRIL) 20 MG tablet    doxazosin (CARDURA) 4 MG tablet    metoprolol succinate XL (TOPROL-XL) 100 MG 24 hr tablet    Other Relevant Orders    Microalbumin / Creatinine Urine Ratio - Urine, Clean Catch    Urinalysis With Culture If Indicated -       Digestive    Vitamin D deficiency    Overview     8/4/2020 Sindy Kruger MD    Check blood level today.         Relevant Orders    Vitamin D 25 Hydroxy       Musculoskeletal and Integument    Osteopenia of multiple sites    Overview     8/4/2020 Sindy Kruger MD    Last DEXA done 10/1/2017 showed some mild improvement in osteopenia T-scores.    Continue daily weightbearing exercises and calcium and vitamin D3.    DEXA ordered.            Other    Menopausal and postmenopausal disorder    Relevant Orders    DEXA Bone Density Axial     Medicare annual wellness visit, subsequent - Primary    Overview     8/4/2020 Sindy Kruger MD    Mammogram and DEXA ordered.         Annual physical exam    Overview     8/4/2020 Sindy Kruger MD    She will get the second hepatitis A vaccine at her pharmacy.    She is also eligible for the new shingles vaccine which has to be done at the pharmacy.           Other Visit Diagnoses     Screening mammogram, encounter for        Relevant Orders    Mammo Screening Digital Tomosynthesis Bilateral With CAD

## 2020-08-04 NOTE — PROGRESS NOTES
The ABCs of the Annual Wellness Visit  Initial Medicare Wellness Visit    Chief Complaint   Patient presents with   • Medicare Wellness-subsequent   • Hypertension     f/u, she's fasting       Subjective   History of Present Illness:  Payal Nix is a 75 y.o. female who presents for an Initial Medicare Wellness Visit.    HPI  Cataracts R>L fastgrowing.  Will have R surgery Wednesday.  Dr. Bry Allen    CHRONIC CONDITIONS:    BP's at home 140-145/70's on cardura and 20mg lisinopril at nite and lisinopril HCT 20/25 in am. Eating better and exercising 3 times a week and yard work and gardening.  Lost some weight.    For hypercholesterolemia, she takes pravastatin every evening and eats a low-fat diet and exercises.    Osteopenia does weightbearing exercises.  Last DEXA 2017 had improved some.    HEALTH RISK ASSESSMENT    Recent Hospitalizations:  No hospitalization(s) within the last year.    Current Medical Providers:  Patient Care Team:  Sindy Kruger MD as PCP - General  Sindy Kruger MD as PCP - Family Medicine    Smoking Status:  Social History     Tobacco Use   Smoking Status Never Smoker   Smokeless Tobacco Never Used       Alcohol Consumption:  Social History     Substance and Sexual Activity   Alcohol Use Yes   • Frequency: 2-4 times a month   • Drinks per session: 1 or 2   • Binge frequency: Never    Comment: once in a while       Depression Screen:   PHQ-2/PHQ-9 Depression Screening 8/4/2020   Little interest or pleasure in doing things 0   Feeling down, depressed, or hopeless 0   Total Score 0       Fall Risk Screen:  REGINEADI Fall Risk Assessment was completed, and patient is at LOW risk for falls.Assessment completed on:8/4/2020    Health Habits and Functional and Cognitive Screening:  Functional & Cognitive Status 8/4/2020   Do you have difficulty preparing food and eating? No   Do you have difficulty bathing yourself, getting dressed or grooming yourself? No   Do you have difficulty using  the toilet? No   Do you have difficulty moving around from place to place? No   Do you have trouble with steps or getting out of a bed or a chair? No   Current Diet Well Balanced Diet   Dental Exam Up to date   Eye Exam Up to date   Exercise (times per week) 3 times per week   Current Exercise Activities Include Walking   Do you need help using the phone?  No   Are you deaf or do you have serious difficulty hearing?  No   Do you need help with transportation? No   Do you need help shopping? No   Do you need help preparing meals?  No   Do you need help with housework?  No   Do you need help with laundry? No   Do you need help taking your medications? No   Do you need help managing money? No   Do you ever drive or ride in a car without wearing a seat belt? No   Have you felt unusual stress, anger or loneliness in the last month? No   Who do you live with? Spouse   If you need help, do you have trouble finding someone available to you? No   Have you been bothered in the last four weeks by sexual problems? No   Do you have difficulty concentrating, remembering or making decisions? No       Does the patient have evidence of cognitive impairment? No    Asprin use counseling:Taking ASA appropriately as indicated    Age-appropriate Screening Schedule:  Refer to the list below for future screening recommendations based on patient's age, sex and/or medical conditions. Orders for these recommended tests are listed in the plan section. The patient has been provided with a written plan.    Age appropriate preventive counseling done including age appropriate vaccines,regular  Mammogram and self breast exam, pap smear, colonoscopy, regular dental visits, mental health, injury prevention such as wearing seat belt and preventing falls, healthy  nutrition, healthy weight, regular physical exercise. Alcohol use is moderate.  Tobacco history-none. Drug use-none.  STD's-not at risk.          Health Maintenance   Topic Date Due   •  ZOSTER VACCINE (2 of 3) 06/13/2017   • INFLUENZA VACCINE  08/01/2020   • LIPID PANEL  11/05/2020   • DXA SCAN  12/11/2020   • TDAP/TD VACCINES (2 - Td) 09/02/2021   • MAMMOGRAM  09/20/2021   • COLONOSCOPY  04/30/2023        The following portions of the patient's history were reviewed and updated as appropriate: allergies, current medications, past family history, past medical history, past social history, past surgical history and problem list.    Outpatient Medications Prior to Visit   Medication Sig Dispense Refill   • aspirin 81 MG tablet Take 1 tablet by mouth Daily.     • B Complex Vitamins (VITAMIN B COMPLEX PO) 1 po qd     • Calcium Citrate-Vitamin D 500-500 MG-UNIT chewable tablet 1 po 2 times a day     • Cholecalciferol (VITAMIN D3) 50 MCG (2000 UT) capsule Take 1 capsule by mouth Daily. 30 capsule 11   • Coenzyme Q10 (CO Q-10) 400 MG capsule 1 capsule every evening     • doxazosin (CARDURA) 4 MG tablet TAKE 1 TABLET BY MOUTH ONCE DAILY AT BEDTIME 90 tablet 0   • lisinopril (PRINIVIL,ZESTRIL) 20 MG tablet Take 1 tablet by mouth once daily 90 tablet 2   • lisinopril-hydrochlorothiazide (PRINZIDE,ZESTORETIC) 20-25 MG per tablet TAKE 1 TABLET BY MOUTH ONCE DAILY 90 tablet 3   • magnesium oxide (MAG-OX) 400 MG tablet Take 1 tablet by mouth Daily.     • pravastatin (PRAVACHOL) 20 MG tablet TAKE 1 TABLET BY MOUTH ONCE DAILY IN THE EVENING 90 tablet 0   • PROCTOFOAM HC 1-1 % rectal foam USE RECTALLY AS NEEDED 10 each 0   • vitamin C (ASCORBIC ACID) 500 MG tablet Take 500 mg by mouth Daily.     • metoprolol succinate XL (TOPROL-XL) 100 MG 24 hr tablet TAKE 1 TABLET BY MOUTH ONCE DAILY AT BEDTIME 90 tablet 1   • cefdinir (OMNICEF) 300 MG capsule Take 1 capsule by mouth 2 (Two) Times a Day. 20 capsule 0   • Potassium Citrate ER 15 MEQ (1620 MG) tablet controlled-release Take 1 tablet by mouth Every 12 (Twelve) Hours.       No facility-administered medications prior to visit.        Patient Active Problem List    Diagnosis   • Hemorrhoids   • Primary osteoarthritis involving multiple joints   • Hypercholesterolemia   • Vitamin D deficiency   • Hypertensive retinopathy   • Overweight (BMI 25.0-29.9)   • Benign essential hypertension   • Osteopenia of multiple sites   • Menopausal and postmenopausal disorder   • Medicare annual wellness visit, subsequent   • Annual physical exam       Advanced Care Planning:  ACP discussion was held with the patient during this visit. Patient has an advance directive (not in EMR), copy requested.    Review of Systems   Constitutional: Negative for chills, fatigue and fever.   HENT: Negative for congestion, ear pain, hearing loss, sinus pressure and sore throat.    Eyes: Positive for visual disturbance.   Respiratory: Negative for cough, chest tightness, shortness of breath and wheezing.    Cardiovascular: Negative for chest pain, palpitations and leg swelling.   Gastrointestinal: Negative for abdominal pain, blood in stool, constipation and diarrhea.   Endocrine: Negative for cold intolerance and heat intolerance.   Genitourinary: Negative for dysuria and frequency.        Treated for UTI with hematuria  recently and symptoms cleared.   Musculoskeletal: Negative for arthralgias, back pain and gait problem.   Skin: Negative for color change, rash and wound.   Allergic/Immunologic: Negative for environmental allergies and food allergies.   Neurological: Negative for dizziness and headaches.   Hematological: Negative for adenopathy. Does not bruise/bleed easily.   Psychiatric/Behavioral: Negative for dysphoric mood, sleep disturbance and suicidal ideas. The patient is not nervous/anxious.        Compared to one year ago, the patient feels her physical health is the same.  Compared to one year ago, the patient feels her mental health is the same.    Reviewed chart for potential of high risk medication in the elderly: yes  Reviewed chart for potential of harmful drug interactions in the  "elderly:yes    Objective         Vitals:    08/04/20 0956 08/04/20 1057   BP: 152/92 138/86   BP Location: Right arm Left arm   Patient Position: Sitting Sitting   Cuff Size: Adult Adult   Pulse: 64    Temp: 97.1 °F (36.2 °C)    TempSrc: Infrared    Weight: 70.3 kg (155 lb)    Height: 157 cm (61.81\")    PainSc: 0-No pain        Body mass index is 28.52 kg/m².  Discussed the patient's BMI with her. The BMI is above average; BMI management plan is completed.    Physical Exam   Constitutional: She is oriented to person, place, and time. She appears well-developed and well-nourished.   HENT:   Head: Normocephalic.   Eyes: Pupils are equal, round, and reactive to light. Conjunctivae and EOM are normal.   Neck: Normal range of motion. Neck supple. No thyromegaly present.   Cardiovascular: Normal rate, regular rhythm, normal heart sounds and intact distal pulses.   Pulmonary/Chest: Effort normal and breath sounds normal. She has no wheezes. Right breast exhibits no inverted nipple, no mass, no nipple discharge, no skin change and no tenderness. Left breast exhibits no inverted nipple, no mass, no nipple discharge, no skin change and no tenderness.   Abdominal: Soft. Bowel sounds are normal. There is no tenderness.   Musculoskeletal: Normal range of motion. She exhibits no edema or tenderness.   Lymphadenopathy:     She has no cervical adenopathy.     She has no axillary adenopathy.   Neurological: She is alert and oriented to person, place, and time. She has normal strength. No cranial nerve deficit or sensory deficit. Coordination and gait normal.   Skin: Skin is warm and dry. No rash noted.   Psychiatric: She has a normal mood and affect. Her speech is normal and behavior is normal. Judgment and thought content normal. Cognition and memory are normal.   Nursing note and vitals reviewed.      ECG 12 Lead  Date/Time: 8/4/2020 11:31 AM  Performed by: Sindy Kruger MD  Authorized by: Sindy Kruger MD   Comparison: " compared with previous ECG   Similar to previous ECG  Rhythm: sinus rhythm  Rate: normal  BPM: 59  Conduction: conduction normal  ST Segments: ST segments normal  T Waves: T waves normal  QRS axis: normal    Clinical impression: normal ECG                    Assessment/Plan   Medicare Risks and Personalized Health Plan  CMS Preventative Services Quick Reference  Cardiovascular risk  Obesity/Overweight     The above risks/problems have been discussed with the patient.  Pertinent information has been shared with the patient in the After Visit Summary.  Follow up plans and orders are seen below in the Assessment/Plan Section.  Patient Instructions   Problem List Items Addressed This Visit        Cardiovascular and Mediastinum    Hypercholesterolemia    Overview     8/4/2020 Sindy Kruger MD     Continue pravastatin every evening.Continue low-fat diet and regular exercise.               Relevant Medications    pravastatin (PRAVACHOL) 20 MG tablet    Other Relevant Orders    CBC & Differential    Comprehensive Metabolic Panel    Lipid Panel    TSH    Benign essential hypertension    Overview     8/4/2020 Sindy Kruger MD    Increase metoprolol to 1-1/2 tablets (150 mg) every evening.  Continue doxazosin every evening.  Continue lisinopril/hydrochlorothiazide 20/25 mg every morning and lisinopril tablet 20 mg every evening.  Continue to avoid salt in the diet and avoid sodas.    She will let us know in a few weeks how the blood pressures are running.    If blood pressures do not improve, we could switch hydrochlorothiazide to chlorthalidone or try hydralazine.  Blood pressure goal is less than 135/80 most of the time.         Relevant Medications    lisinopril-hydrochlorothiazide (PRINZIDE,ZESTORETIC) 20-25 MG per tablet    lisinopril (PRINIVIL,ZESTRIL) 20 MG tablet    doxazosin (CARDURA) 4 MG tablet    metoprolol succinate XL (TOPROL-XL) 100 MG 24 hr tablet    Other Relevant Orders    Microalbumin / Creatinine  Urine Ratio - Urine, Clean Catch    Urinalysis With Culture If Indicated -       Digestive    Vitamin D deficiency    Overview     8/4/2020 Sindy Kruger MD    Check blood level today.         Relevant Orders    Vitamin D 25 Hydroxy       Musculoskeletal and Integument    Osteopenia of multiple sites    Overview     8/4/2020 Sindy Kruger MD    Last DEXA done 10/1/2017 showed some mild improvement in osteopenia T-scores.    Continue daily weightbearing exercises and calcium and vitamin D3.    DEXA ordered.            Other    Menopausal and postmenopausal disorder    Relevant Orders    DEXA Bone Density Axial    Medicare annual wellness visit, subsequent - Primary    Overview     8/4/2020 Sindy Kruger MD    Mammogram and DEXA ordered.         Annual physical exam    Overview     8/4/2020 Sindy Kruger MD    She will get the second hepatitis A vaccine at her pharmacy.    She is also eligible for the new shingles vaccine which has to be done at the pharmacy.           Other Visit Diagnoses     Screening mammogram, encounter for        Relevant Orders    Mammo Screening Digital Tomosynthesis Bilateral With CAD           Follow Up:  Return in about 6 months (around 2/4/2021) for recheck fasting.     An After Visit Summary and PPPS were given to the patient.

## 2020-08-05 LAB
25(OH)D3 SERPL-MCNC: 58.1 NG/ML (ref 30–100)
ALBUMIN SERPL-MCNC: 4.5 G/DL (ref 3.5–5.2)
ALBUMIN UR-MCNC: <1.2 MG/DL
ALBUMIN/GLOB SERPL: 1.7 G/DL
ALP SERPL-CCNC: 65 U/L (ref 39–117)
ALT SERPL W P-5'-P-CCNC: 13 U/L (ref 1–33)
ANION GAP SERPL CALCULATED.3IONS-SCNC: 11.2 MMOL/L (ref 5–15)
AST SERPL-CCNC: 19 U/L (ref 1–32)
BILIRUB SERPL-MCNC: 0.8 MG/DL (ref 0–1.2)
BUN SERPL-MCNC: 14 MG/DL (ref 8–23)
BUN/CREAT SERPL: 16.1 (ref 7–25)
CALCIUM SPEC-SCNC: 10.1 MG/DL (ref 8.6–10.5)
CHLORIDE SERPL-SCNC: 100 MMOL/L (ref 98–107)
CHOLEST SERPL-MCNC: 169 MG/DL (ref 0–200)
CO2 SERPL-SCNC: 27.8 MMOL/L (ref 22–29)
CREAT SERPL-MCNC: 0.87 MG/DL (ref 0.57–1)
CREAT UR-MCNC: 94.5 MG/DL
GFR SERPL CREATININE-BSD FRML MDRD: 64 ML/MIN/1.73
GLOBULIN UR ELPH-MCNC: 2.6 GM/DL
GLUCOSE SERPL-MCNC: 109 MG/DL (ref 65–99)
HDLC SERPL-MCNC: 56 MG/DL (ref 40–60)
LDLC SERPL CALC-MCNC: 94 MG/DL (ref 0–100)
LDLC/HDLC SERPL: 1.68 {RATIO}
MICROALBUMIN/CREAT UR: NORMAL MG/G{CREAT}
POTASSIUM SERPL-SCNC: 3.6 MMOL/L (ref 3.5–5.2)
PROT SERPL-MCNC: 7.1 G/DL (ref 6–8.5)
SODIUM SERPL-SCNC: 139 MMOL/L (ref 136–145)
TRIGL SERPL-MCNC: 94 MG/DL (ref 0–150)
TSH SERPL DL<=0.05 MIU/L-ACNC: 2.83 UIU/ML (ref 0.27–4.2)
VLDLC SERPL-MCNC: 18.8 MG/DL (ref 5–40)

## 2020-08-06 LAB — BACTERIA SPEC AEROBE CULT: NO GROWTH

## 2020-08-06 RX ORDER — PRAVASTATIN SODIUM 20 MG
TABLET ORAL
Qty: 90 TABLET | Refills: 0 | Status: SHIPPED | OUTPATIENT
Start: 2020-08-06 | End: 2020-11-09

## 2020-09-13 ENCOUNTER — APPOINTMENT (OUTPATIENT)
Dept: PREADMISSION TESTING | Facility: HOSPITAL | Age: 75
End: 2020-09-13

## 2020-09-13 LAB — SARS-COV-2 RNA NOSE QL NAA+PROBE: NOT DETECTED

## 2020-09-13 PROCEDURE — C9803 HOPD COVID-19 SPEC COLLECT: HCPCS

## 2020-09-13 PROCEDURE — U0004 COV-19 TEST NON-CDC HGH THRU: HCPCS

## 2020-09-16 ENCOUNTER — OFFICE VISIT (OUTPATIENT)
Dept: INTERNAL MEDICINE | Facility: CLINIC | Age: 75
End: 2020-09-16

## 2020-09-16 VITALS
BODY MASS INDEX: 29 KG/M2 | WEIGHT: 157.6 LBS | SYSTOLIC BLOOD PRESSURE: 124 MMHG | DIASTOLIC BLOOD PRESSURE: 76 MMHG | OXYGEN SATURATION: 98 % | TEMPERATURE: 96.8 F | HEIGHT: 62 IN | HEART RATE: 76 BPM

## 2020-09-16 DIAGNOSIS — R55 SYNCOPE, UNSPECIFIED SYNCOPE TYPE: Primary | ICD-10-CM

## 2020-09-16 DIAGNOSIS — H93.8X3 PRESSURE SENSATION IN BOTH EARS: ICD-10-CM

## 2020-09-16 DIAGNOSIS — I10 BENIGN ESSENTIAL HYPERTENSION: ICD-10-CM

## 2020-09-16 PROCEDURE — 99213 OFFICE O/P EST LOW 20 MIN: CPT | Performed by: NURSE PRACTITIONER

## 2020-09-16 NOTE — PROGRESS NOTES
Payal Nix  1945  [id number]  Patient Care Team:  Sindy Kruger MD as PCP - General  Sindy Kruger MD as PCP - Family Medicine    Payal Nix is a pleasant 75 y.o. female who presents for evaluation of Earache (x3 weeks) and Loss of Consciousness (Patient states she passed out Sunday while using the restroom, patient did hit her head)      Chief Complaint   Patient presents with   • Earache     x3 weeks   • Loss of Consciousness     Patient states she passed out Sunday while using the restroom, patient did hit her head       HPI:   3 weeks has had symptoms of ear plugging, no pas hx of allergies    Sunday around 3pm had episode of felt like she was going to vomit or have  bm, got clammy and dizziness, went to br and awoke on the floor.  No hx of syncpoal episodes.  Never vomited but did have diarrhea.  Has knot on head.  No subsequent symptoms.  [place] tired Monday but no dizziness.  Wondered if this was all realted to ears.    Cataract surg 3 sk ago, she has been showering with head back instead  of bending forward  Past Medical History:   Diagnosis Date   • Arthritis    • Cervical cancer (CMS/HCC)     Dr. Orr   • Cervical cancer (CMS/HCC)     in situ; Hysterectomy 1982   • Chronic right-sided thoracic back pain 1/24/2019   • Fatigue 1/24/2019   • Fibrocystic breast    • Hyperlipidemia    • Menopausal state      Past Surgical History:   Procedure Laterality Date   • BREAST CYST ASPIRATION     • D&C WITH CERVICAL CONIZATION  1981   • HYSTERECTOMY  1982    Ovaries intact. cervical cancer in situ     Family History   Problem Relation Age of Onset   • Stroke Mother    • Tuberculosis Maternal Aunt    • Tuberculosis Maternal Grandmother    • Breast cancer Neg Hx    • Endometrial cancer Neg Hx    • Ovarian cancer Neg Hx      Social History     Tobacco Use   Smoking Status Never Smoker   Smokeless Tobacco Never Used     Allergies   Allergen Reactions   • Amlodipine Besylate Other (See Comments)      edema  Other reaction(s): edema   • Benazepril Hcl Unknown (See Comments)   • Ciprofloxacin Other (See Comments)     Nausea and chills   • Ciprofloxacin Hcl Other (See Comments)     Nausea and chills   • Levofloxacin Nausea And Vomiting     Other reaction(s): nausea, vomiting   • Lotrel [Amlodipine Besy-Benazepril Hcl] Other (See Comments)     unknown       Current Outpatient Medications:   •  aspirin 81 MG tablet, Take 1 tablet by mouth Daily., Disp: , Rfl:   •  B Complex Vitamins (VITAMIN B COMPLEX PO), 1 po qd, Disp: , Rfl:   •  Calcium Citrate-Vitamin D 500-500 MG-UNIT chewable tablet, 1 po 2 times a day, Disp: , Rfl:   •  Cholecalciferol (VITAMIN D3) 50 MCG (2000 UT) capsule, Take 1 capsule by mouth Daily., Disp: 30 capsule, Rfl: 11  •  Coenzyme Q10 (CO Q-10) 400 MG capsule, 1 capsule every evening, Disp: , Rfl:   •  doxazosin (CARDURA) 4 MG tablet, TAKE 1 TABLET BY MOUTH ONCE DAILY AT BEDTIME, Disp: 90 tablet, Rfl: 0  •  lisinopril (PRINIVIL,ZESTRIL) 20 MG tablet, Take 1 tablet by mouth once daily, Disp: 90 tablet, Rfl: 2  •  lisinopril-hydrochlorothiazide (PRINZIDE,ZESTORETIC) 20-25 MG per tablet, TAKE 1 TABLET BY MOUTH ONCE DAILY, Disp: 90 tablet, Rfl: 3  •  magnesium oxide (MAG-OX) 400 MG tablet, Take 1 tablet by mouth Daily., Disp: , Rfl:   •  metoprolol succinate XL (TOPROL-XL) 100 MG 24 hr tablet, Take 1.5 tablets by mouth every night at bedtime., Disp: 146 tablet, Rfl: 1  •  pravastatin (PRAVACHOL) 20 MG tablet, TAKE 1 TABLET BY MOUTH ONCE DAILY IN THE EVENING, Disp: 90 tablet, Rfl: 0  •  PROCTOFOAM HC 1-1 % rectal foam, USE RECTALLY AS NEEDED, Disp: 10 each, Rfl: 0  •  vitamin C (ASCORBIC ACID) 500 MG tablet, Take 500 mg by mouth Daily., Disp: , Rfl:     Review of Systems   Constitutional: Negative for chills, fatigue and fever.   HENT: Positive for ear pain. Negative for congestion and sinus pressure.    Respiratory: Negative for cough, chest tightness, shortness of breath and wheezing.   "  Cardiovascular: Negative for chest pain and palpitations.   Gastrointestinal: Negative for abdominal pain, blood in stool and constipation.   Skin: Negative for color change.   Allergic/Immunologic: Negative for environmental allergies.   Neurological: Negative for dizziness, speech difficulty and headache.   Psychiatric/Behavioral: Negative for decreased concentration. The patient is not nervous/anxious.      /76 (BP Location: Left arm, Patient Position: Sitting, Cuff Size: Adult)   Pulse 76   Temp 96.8 °F (36 °C) (Temporal)   Ht 157 cm (61.81\")   Wt 71.5 kg (157 lb 9.6 oz)   SpO2 98%   BMI 29.00 kg/m²     Physical Exam  Constitutional:       Appearance: Normal appearance.   HENT:      Head:      Comments: *wearing mask     Right Ear: Tympanic membrane, ear canal and external ear normal.      Left Ear: Tympanic membrane, ear canal and external ear normal.   Eyes:      Extraocular Movements: Extraocular movements intact.      Pupils: Pupils are equal, round, and reactive to light.   Skin:     General: Skin is warm and dry.      Capillary Refill: Capillary refill takes less than 2 seconds.   Neurological:      General: No focal deficit present.      Mental Status: She is alert and oriented to person, place, and time.   Psychiatric:         Mood and Affect: Mood normal.         Behavior: Behavior normal.         Thought Content: Thought content normal.         Judgment: Judgment normal.         Results Review:  None    Assessment/Plan:  Payal was seen today for earache and loss of consciousness.    Diagnoses and all orders for this visit:    Syncope, unspecified syncope type  Comments:  maintain good hydration, montior bp    Pressure sensation in both ears  Comments:  try adding daily antihistamine to see if this helps ear sx    Benign essential hypertension  Comments:  stable, continue current meds, monitor at home, if further episodes of dizziness or syncope will need cards referral       There are no " Patient Instructions on file for this visit.  Plan of care reviewed with patient at the conclusion of today's visit. Education was provided regarding diagnosis, management and any prescribed or recommended OTC medications.  Patient verbalizes understanding of and agreement with management plan.    Return if symptoms worsen or fail to improve.    *Note that portions of this note were completed with a voice recognition program.  Efforts were made to edit the dictation but occasionally words are transcribed.    Lakshmi Page, APRN

## 2020-09-27 ENCOUNTER — APPOINTMENT (OUTPATIENT)
Dept: PREADMISSION TESTING | Facility: HOSPITAL | Age: 75
End: 2020-09-27

## 2020-09-27 PROCEDURE — C9803 HOPD COVID-19 SPEC COLLECT: HCPCS

## 2020-09-27 PROCEDURE — U0004 COV-19 TEST NON-CDC HGH THRU: HCPCS

## 2020-09-28 LAB — SARS-COV-2 RNA NOSE QL NAA+PROBE: NOT DETECTED

## 2020-10-08 RX ORDER — PRAMOXINE HYDROCHLORIDE HYDROCORTISONE ACETATE 100; 100 MG/10G; MG/10G
AEROSOL, FOAM TOPICAL
Qty: 10 G | Refills: 0 | Status: SHIPPED | OUTPATIENT
Start: 2020-10-08 | End: 2021-03-10

## 2020-10-19 RX ORDER — METOPROLOL SUCCINATE 100 MG/1
TABLET, EXTENDED RELEASE ORAL
Qty: 90 TABLET | Refills: 0 | Status: SHIPPED | OUTPATIENT
Start: 2020-10-19 | End: 2021-01-19

## 2020-11-13 RX ORDER — PRAVASTATIN SODIUM 20 MG
TABLET ORAL
Qty: 90 TABLET | Refills: 1 | Status: SHIPPED | OUTPATIENT
Start: 2020-11-13 | End: 2021-02-04 | Stop reason: SDUPTHER

## 2020-11-13 NOTE — TELEPHONE ENCOUNTER
Caller: Payal Nix    Relationship: Self    Best call back number: [phone]    Medication needed:   Requested Prescriptions     Pending Prescriptions Disp Refills   • pravastatin (PRAVACHOL) 20 MG tablet [Pharmacy Med Name: Pravastatin Sodium 20 MG Oral Tablet] 90 tablet 1     Sig: TAKE 1 TABLET BY MOUTH ONCE DAILY IN THE EVENING       When do you need the refill by: 11/13/20    What details did the patient provide when requesting the medication: patient is out of medication    Does the patient have less than a 3 day supply:  [x] Yes  [] No    What is the patient's preferred pharmacy: [facility] [address] [place] [address] [phone] [facility] [phone] FX

## 2020-12-03 ENCOUNTER — HOSPITAL ENCOUNTER (OUTPATIENT)
Dept: MAMMOGRAPHY | Facility: HOSPITAL | Age: 75
Discharge: HOME | End: 2020-12-03
Admitting: INTERNAL MEDICINE

## 2020-12-03 DIAGNOSIS — Z12.31 SCREENING MAMMOGRAM, ENCOUNTER FOR: ICD-10-CM

## 2020-12-03 PROCEDURE — 77067 SCR MAMMO BI INCL CAD: CPT | Performed by: RADIOLOGY

## 2020-12-03 PROCEDURE — 77063 BREAST TOMOSYNTHESIS BI: CPT

## 2020-12-03 PROCEDURE — 77063 BREAST TOMOSYNTHESIS BI: CPT | Performed by: RADIOLOGY

## 2020-12-03 PROCEDURE — 77067 SCR MAMMO BI INCL CAD: CPT

## 2020-12-17 RX ORDER — DOXAZOSIN MESYLATE 4 MG/1
TABLET ORAL
Qty: 90 TABLET | Refills: 0 | Status: SHIPPED | OUTPATIENT
Start: 2020-12-17 | End: 2021-02-04 | Stop reason: SDUPTHER

## 2021-01-07 ENCOUNTER — HOSPITAL ENCOUNTER (OUTPATIENT)
Dept: BONE DENSITY | Facility: HOSPITAL | Age: 76
Discharge: HOME | End: 2021-01-07
Admitting: INTERNAL MEDICINE

## 2021-01-07 PROCEDURE — 77080 DXA BONE DENSITY AXIAL: CPT

## 2021-01-19 RX ORDER — METOPROLOL SUCCINATE 100 MG/1
TABLET, EXTENDED RELEASE ORAL
Qty: 90 TABLET | Refills: 0 | Status: SHIPPED | OUTPATIENT
Start: 2021-01-19 | End: 2021-02-04 | Stop reason: SDUPTHER

## 2021-02-04 ENCOUNTER — OFFICE VISIT (OUTPATIENT)
Dept: INTERNAL MEDICINE | Facility: CLINIC | Age: 76
End: 2021-02-04

## 2021-02-04 ENCOUNTER — LAB (OUTPATIENT)
Dept: LAB | Facility: HOSPITAL | Age: 76
End: 2021-02-04

## 2021-02-04 VITALS
DIASTOLIC BLOOD PRESSURE: 78 MMHG | SYSTOLIC BLOOD PRESSURE: 146 MMHG | HEIGHT: 62 IN | HEART RATE: 68 BPM | WEIGHT: 160.6 LBS | TEMPERATURE: 97.1 F | BODY MASS INDEX: 29.55 KG/M2

## 2021-02-04 DIAGNOSIS — E78.00 HYPERCHOLESTEROLEMIA: ICD-10-CM

## 2021-02-04 DIAGNOSIS — I10 BENIGN ESSENTIAL HYPERTENSION: Primary | ICD-10-CM

## 2021-02-04 DIAGNOSIS — M85.89 OSTEOPENIA OF MULTIPLE SITES: ICD-10-CM

## 2021-02-04 DIAGNOSIS — E66.3 OVERWEIGHT WITH BODY MASS INDEX (BMI) OF 29 TO 29.9 IN ADULT: ICD-10-CM

## 2021-02-04 DIAGNOSIS — I10 BENIGN ESSENTIAL HYPERTENSION: ICD-10-CM

## 2021-02-04 PROBLEM — E55.9 VITAMIN D DEFICIENCY: Status: RESOLVED | Noted: 2019-01-24 | Resolved: 2021-02-04

## 2021-02-04 LAB
ALBUMIN SERPL-MCNC: 4.6 G/DL (ref 3.5–5.2)
ALBUMIN/GLOB SERPL: 1.8 G/DL
ALP SERPL-CCNC: 75 U/L (ref 39–117)
ALT SERPL W P-5'-P-CCNC: 13 U/L (ref 1–33)
ANION GAP SERPL CALCULATED.3IONS-SCNC: 12.7 MMOL/L (ref 5–15)
AST SERPL-CCNC: 17 U/L (ref 1–32)
BACTERIA UR QL AUTO: ABNORMAL /HPF
BILIRUB SERPL-MCNC: 0.6 MG/DL (ref 0–1.2)
BILIRUB UR QL STRIP: NEGATIVE
BUN SERPL-MCNC: 12 MG/DL (ref 8–23)
BUN/CREAT SERPL: 16 (ref 7–25)
CALCIUM SPEC-SCNC: 10.5 MG/DL (ref 8.6–10.5)
CHLORIDE SERPL-SCNC: 99 MMOL/L (ref 98–107)
CHOLEST SERPL-MCNC: 180 MG/DL (ref 0–200)
CLARITY UR: CLEAR
CO2 SERPL-SCNC: 29.3 MMOL/L (ref 22–29)
COLOR UR: YELLOW
CREAT SERPL-MCNC: 0.75 MG/DL (ref 0.57–1)
GFR SERPL CREATININE-BSD FRML MDRD: 75 ML/MIN/1.73
GLOBULIN UR ELPH-MCNC: 2.6 GM/DL
GLUCOSE SERPL-MCNC: 87 MG/DL (ref 65–99)
GLUCOSE UR STRIP-MCNC: NEGATIVE MG/DL
HDLC SERPL-MCNC: 60 MG/DL (ref 40–60)
HGB UR QL STRIP.AUTO: ABNORMAL
HYALINE CASTS UR QL AUTO: ABNORMAL /LPF
KETONES UR QL STRIP: NEGATIVE
LDLC SERPL CALC-MCNC: 103 MG/DL (ref 0–100)
LDLC/HDLC SERPL: 1.68 {RATIO}
LEUKOCYTE ESTERASE UR QL STRIP.AUTO: NEGATIVE
NITRITE UR QL STRIP: NEGATIVE
PH UR STRIP.AUTO: 6.5 [PH] (ref 5–8)
POTASSIUM SERPL-SCNC: 3.7 MMOL/L (ref 3.5–5.2)
PROT SERPL-MCNC: 7.2 G/DL (ref 6–8.5)
PROT UR QL STRIP: NEGATIVE
RBC # UR: ABNORMAL /HPF
REF LAB TEST METHOD: ABNORMAL
SODIUM SERPL-SCNC: 141 MMOL/L (ref 136–145)
SP GR UR STRIP: 1.01 (ref 1–1.03)
SQUAMOUS #/AREA URNS HPF: ABNORMAL /HPF
TRIGL SERPL-MCNC: 96 MG/DL (ref 0–150)
UROBILINOGEN UR QL STRIP: ABNORMAL
VLDLC SERPL-MCNC: 17 MG/DL (ref 5–40)
WBC UR QL AUTO: ABNORMAL /HPF

## 2021-02-04 PROCEDURE — 80061 LIPID PANEL: CPT

## 2021-02-04 PROCEDURE — 99214 OFFICE O/P EST MOD 30 MIN: CPT | Performed by: INTERNAL MEDICINE

## 2021-02-04 PROCEDURE — 80053 COMPREHEN METABOLIC PANEL: CPT

## 2021-02-04 PROCEDURE — 82043 UR ALBUMIN QUANTITATIVE: CPT

## 2021-02-04 PROCEDURE — 81001 URINALYSIS AUTO W/SCOPE: CPT

## 2021-02-04 PROCEDURE — 82570 ASSAY OF URINE CREATININE: CPT

## 2021-02-04 RX ORDER — DOXAZOSIN MESYLATE 4 MG/1
4 TABLET ORAL NIGHTLY
Qty: 90 TABLET | Refills: 1 | Status: SHIPPED | OUTPATIENT
Start: 2021-02-04 | End: 2021-08-09 | Stop reason: SDUPTHER

## 2021-02-04 RX ORDER — LISINOPRIL AND HYDROCHLOROTHIAZIDE 25; 20 MG/1; MG/1
1 TABLET ORAL DAILY
Qty: 90 TABLET | Refills: 3 | Status: SHIPPED | OUTPATIENT
Start: 2021-02-04 | End: 2021-08-15

## 2021-02-04 RX ORDER — METOPROLOL SUCCINATE 100 MG/1
TABLET, EXTENDED RELEASE ORAL
Qty: 135 TABLET | Refills: 1 | Status: SHIPPED | OUTPATIENT
Start: 2021-02-04 | End: 2021-03-22

## 2021-02-04 RX ORDER — LISINOPRIL 20 MG/1
20 TABLET ORAL NIGHTLY
Qty: 90 TABLET | Refills: 2 | Status: SHIPPED | OUTPATIENT
Start: 2021-02-04 | End: 2022-02-14

## 2021-02-04 RX ORDER — PRAVASTATIN SODIUM 20 MG
20 TABLET ORAL EVERY EVENING
Qty: 90 TABLET | Refills: 1 | Status: SHIPPED | OUTPATIENT
Start: 2021-02-04 | End: 2021-08-09 | Stop reason: SDUPTHER

## 2021-02-04 NOTE — PROGRESS NOTES
[facility]     Payal Nix  1945   [id number]      Patient Care Team:  Sindy Kruger MD as PCP - General  Sindy Kruger MD as PCP - Family Medicine    Chief Complaint   Patient presents with   • Hypertension     f/u, she's fasting            HPI  Patient is a 76 y.o. female presents with hypertension, hyperlipidemia, osteopenia      CHRONIC CONDITIONS    Blood pressures at home have been running 140-159 over 70s to 83 most of the time.  Occasionally a little higher.  She takes her medications regularly as prescribed.  No side effects with any of them.  Exercising less. Tries to do a lot of steps per day. Does some yoga stretches. Avoids fats and salt in diet usually.     For hyperlipidemia, taking pravastatin every evening.  Usually eats a low-fat healthy diet.    For osteopenia, she does do lots of up and down the stairs and exercises on her feet during the house.  She has not been doing much with hand weights lately.  She gets a lot of steps but not 30 minutes with cardio exercise a day.    Past Medical History:   Diagnosis Date   • Arthritis    • Cervical cancer (CMS/[facility]ly [facility])     Dr. Orr   • Cervical cancer (CMS/HCC)     in situ; Hysterectomy 1982   • Chronic right-sided thoracic back pain 1/24/2019   • Fatigue 1/24/2019   • Fibrocystic breast    • Hyperlipidemia    • Menopausal state        Past Surgical History:   Procedure Laterality Date   • BREAST CYST ASPIRATION     • CATARACT EXTRACTION, BILATERAL Bilateral     2021   • D&C WITH CERVICAL CONIZATION  1981   • HYSTERECTOMY  1982    Ovaries intact. cervical cancer in situ       Family History   Problem Relation Age of Onset   • Stroke Mother    • Tuberculosis Maternal Aunt    • Tuberculosis Maternal Grandmother    • Breast cancer Neg Hx    • Endometrial cancer Neg Hx    • Ovarian cancer Neg Hx        Social History     Socioeconomic History   • Marital status:      Spouse name: Not on file   • Number of children: Not  "on file   • Years of education: Not on file   • Highest education level: Not on file   Tobacco Use   • Smoking status: Never Smoker   • Smokeless tobacco: Never Used   Substance and Sexual Activity   • Alcohol use: Yes     Frequency: 2-4 times a month     Drinks per session: 1 or 2     Binge frequency: Never     Comment: once in a while   • Drug use: No   • Sexual activity: Defer       Allergies   Allergen Reactions   • Amlodipine Besylate Other (See Comments)     edema  Other reaction(s): edema   • Benazepril Hcl Unknown (See Comments)   • Ciprofloxacin Other (See Comments)     Nausea and chills   • Ciprofloxacin Hcl Other (See Comments)     Nausea and chills   • Levofloxacin Nausea And Vomiting     Other reaction(s): nausea, vomiting   • Lotrel [Amlodipine Besy-Benazepril Hcl] Other (See Comments)     unknown       Review of Systems:     Review of Systems   Constitutional: Negative for chills, fatigue and fever.   HENT: Negative for congestion, ear pain and sinus pressure.    Respiratory: Negative for cough, chest tightness, shortness of breath and wheezing.    Cardiovascular: Negative for chest pain, palpitations and leg swelling.   Gastrointestinal: Negative for abdominal pain, blood in stool and constipation.   Skin: Negative for color change.   Allergic/Immunologic: Negative for environmental allergies.   Neurological: Negative for dizziness and headache.   Psychiatric/Behavioral: Negative for depressed mood. The patient is not nervous/anxious.        Vital Signs  Vitals:    02/04/21 1002 02/04/21 1114   BP: 160/80 146/78   BP Location: Left arm    Patient Position: Sitting    Cuff Size: Adult    Pulse: 68    Temp: 97.1 °F (36.2 °C)    TempSrc: Infrared    Weight: 72.8 kg (160 lb 9.6 oz)    Height: 157 cm (61.81\")    PainSc: 0-No pain      Body mass index is 29.55 kg/m².      Current Outpatient Medications:   •  aspirin 81 MG tablet, Take 1 tablet by mouth Daily., Disp: , Rfl:   •  B Complex Vitamins (VITAMIN " B COMPLEX PO), 1 po qd, Disp: , Rfl:   •  Calcium Citrate-Vitamin D 500-500 MG-UNIT chewable tablet, 1 po 2 times a day, Disp: , Rfl:   •  Coenzyme Q10 (CO Q-10) 400 MG capsule, 1 capsule every evening, Disp: , Rfl:   •  doxazosin (CARDURA) 4 MG tablet, Take 1 tablet by mouth Every Night., Disp: 90 tablet, Rfl: 1  •  lisinopril (PRINIVIL,ZESTRIL) 20 MG tablet, Take 1 tablet by mouth Every Night., Disp: 90 tablet, Rfl: 2  •  lisinopril-hydrochlorothiazide (PRINZIDE,ZESTORETIC) 20-25 MG per tablet, Take 1 tablet by mouth Daily., Disp: 90 tablet, Rfl: 3  •  magnesium oxide (MAG-OX) 400 MG tablet, Take 1 tablet by mouth Daily., Disp: , Rfl:   •  metoprolol succinate XL (TOPROL-XL) 100 MG 24 hr tablet, Take 1/2 tablet every morning and 1 whole tablet every evening, Disp: 135 tablet, Rfl: 1  •  pravastatin (PRAVACHOL) 20 MG tablet, Take 1 tablet by mouth Every Evening., Disp: 90 tablet, Rfl: 1  •  Proctofoam HC 1-1 % rectal foam, APPLY  CREAM RECTALLY AS NEEDED, Disp: 10 g, Rfl: 0  •  vitamin C (ASCORBIC ACID) 500 MG tablet, Take 500 mg by mouth Daily., Disp: , Rfl:     Physical Exam:    Physical Exam  Vitals signs and nursing note reviewed.   Constitutional:       Appearance: She is well-developed and overweight.   HENT:      Head: Normocephalic.   Eyes:      Conjunctiva/sclera: Conjunctivae normal.      Pupils: Pupils are equal, round, and reactive to light.   Neck:      Musculoskeletal: Normal range of motion and neck supple.      Thyroid: No thyromegaly.   Cardiovascular:      Rate and Rhythm: Normal rate and regular rhythm.      Heart sounds: Normal heart sounds.   Pulmonary:      Effort: Pulmonary effort is normal.      Breath sounds: Normal breath sounds. No wheezing.   Musculoskeletal: Normal range of motion.   Lymphadenopathy:      Cervical: No cervical adenopathy.   Skin:     General: Skin is warm and dry.   Neurological:      Mental Status: She is alert and oriented to person, place, and time.   Psychiatric:          Thought Content: Thought content normal.          ACE III MINI        Results Review:    None    CMP:  Lab Results   Component Value Date    BUN 14 08/04/2020    CREATININE 0.87 08/04/2020    EGFRIFNONA 64 08/04/2020    BCR 16.1 08/04/2020     08/04/2020    K 3.6 08/04/2020    CO2 27.8 08/04/2020    CALCIUM 10.1 08/04/2020    ALBUMIN 4.50 08/04/2020    BILITOT 0.8 08/04/2020    ALKPHOS 65 08/04/2020    AST 19 08/04/2020    ALT 13 08/04/2020     HbA1c:  No results found for: HGBA1C  Microalbumin:  Lab Results   Component Value Date    MICROALBUR <1.2 08/04/2020     Lipid Panel  Lab Results   Component Value Date    CHOL 169 08/04/2020    TRIG 94 08/04/2020    HDL 56 08/04/2020    LDL 94 08/04/2020    AST 19 08/04/2020    ALT 13 08/04/2020       Medication Review: Medications reviewed and noted  Patient Instructions   Problem List Items Addressed This Visit        Cardiac and Vasculature    Hypercholesterolemia    Overview     2/4/2021 Sindy Kruger MD     Continue pravastatin every evening.Continue low-fat diet and regular exercise.               Relevant Medications    pravastatin (PRAVACHOL) 20 MG tablet    Other Relevant Orders    Lipid Panel    Benign essential hypertension - Primary    Overview     2/4/2021 Sindy Kruger MD    Increase metoprolol XL to 1/2 tablet (50mg) every morning and continue 1 whole tablet (100mg) every evening.  Continue doxazosin every evening.  Continue lisinopril/hydrochlorothiazide 20/25 mg every morning and lisinopril tablet 20 mg every evening.  Continue to avoid salt in the diet and avoid sodas.    She will let us know in a few weeks how the blood pressures are running.  Goal is less than 140/80 all the time.  Better to be less than 135/80 most of the time.             Relevant Medications    lisinopril (PRINIVIL,ZESTRIL) 20 MG tablet    doxazosin (CARDURA) 4 MG tablet    lisinopril-hydrochlorothiazide (PRINZIDE,ZESTORETIC) 20-25 MG per tablet    metoprolol  succinate XL (TOPROL-XL) 100 MG 24 hr tablet    Other Relevant Orders    Comprehensive Metabolic Panel    Microalbumin / Creatinine Urine Ratio - Urine, Clean Catch    Urinalysis With Culture If Indicated -       Musculoskeletal and Injuries    Osteopenia of multiple sites    Overview     2/4/2021 Sindy Kruger MD    Last DEXA 1/2021 showed some mild improvement in osteopenia T-scores of the hip.  The lowest T score is -1.8 in the femoral neck.  Spine T-scores decreased slightly but is still very mild osteopenia at -1.1.    Continue daily weightbearing exercises including exercises on the feet and using small hand weights at home.  Continue calcium with vitamin D3.    Recheck DEXA in 2 years.              Other    Overweight with body mass index (BMI) of 29 to 29.9 in adult    Overview     2/4/2021 Sindy Kruger MD    Try to get more cardio exercise, 20 to 30 minutes a day keeping the heart rate up.  Use the exercise bike more often.  Use small hand weights about 5 minutes a day.    Continue low-fat healthy diet.                  Diagnosis Plan   1. Benign essential hypertension  Comprehensive Metabolic Panel    Microalbumin / Creatinine Urine Ratio - Urine, Clean Catch    Urinalysis With Culture If Indicated -    lisinopril (PRINIVIL,ZESTRIL) 20 MG tablet   2. Hypercholesterolemia  Lipid Panel    pravastatin (PRAVACHOL) 20 MG tablet   3. Osteopenia of multiple sites     4. Overweight with body mass index (BMI) of 29 to 29.9 in adult             Plan of care reviewed with patient at the conclusion of today's visit. Education was provided regarding diagnosis, management, and any prescribed or recommended OTC medications.Patient verbalizes understanding of and agreement with management plan.         Sindy Kruger MD

## 2021-02-04 NOTE — PATIENT INSTRUCTIONS
Patient Instructions   Problem List Items Addressed This Visit        Cardiac and Vasculature    Hypercholesterolemia    Overview     2/4/2021 Sindy Kruger MD     Continue pravastatin every evening.Continue low-fat diet and regular exercise.               Relevant Medications    pravastatin (PRAVACHOL) 20 MG tablet    Other Relevant Orders    Lipid Panel    Benign essential hypertension - Primary    Overview     2/4/2021 Sindy Kruger MD    Increase metoprolol XL to 1/2 tablet (50mg) every morning and continue 1 whole tablet (100mg) every evening.  Continue doxazosin every evening.  Continue lisinopril/hydrochlorothiazide 20/25 mg every morning and lisinopril tablet 20 mg every evening.  Continue to avoid salt in the diet and avoid sodas.    She will let us know in a few weeks how the blood pressures are running.  Goal is less than 140/80 all the time.  Better to be less than 135/80 most of the time.             Relevant Medications    lisinopril (PRINIVIL,ZESTRIL) 20 MG tablet    doxazosin (CARDURA) 4 MG tablet    lisinopril-hydrochlorothiazide (PRINZIDE,ZESTORETIC) 20-25 MG per tablet    metoprolol succinate XL (TOPROL-XL) 100 MG 24 hr tablet    Other Relevant Orders    Comprehensive Metabolic Panel    Microalbumin / Creatinine Urine Ratio - Urine, Clean Catch    Urinalysis With Culture If Indicated -       Musculoskeletal and Injuries    Osteopenia of multiple sites    Overview     2/4/2021 Sindy Kruger MD    Last DEXA 1/2021 showed some mild improvement in osteopenia T-scores of the hip.  The lowest T score is -1.8 in the femoral neck.  Spine T-scores decreased slightly but is still very mild osteopenia at -1.1.    Continue daily weightbearing exercises including exercises on the feet and using small hand weights at home.  Continue calcium with vitamin D3.    Recheck DEXA in 2 years.              Other    Overweight with body mass index (BMI) of 29 to 29.9 in adult    Overview     2/4/2021 Sindy SAUCEDO  "MD [facility]lick    Try to get more cardio exercise, 20 to 30 minutes a day keeping the heart rate up.  Use the exercise bike more often.  Use small hand weights about 5 minutes a day.    Continue low-fat healthy diet.                  BMI for Adults  What is BMI?  Body mass index (BMI) is a number that is calculated from a person's weight and height. BMI can help estimate how much of a person's weight is composed of fat. BMI does not measure body fat directly. Rather, it is an alternative to procedures that directly measure body fat, which can be difficult and expensive.  BMI can help identify people who may be at higher risk for certain medical problems.  What are BMI measurements used for?  BMI is used as a screening tool to identify possible weight problems. It helps determine whether a person is obese, overweight, a healthy weight, or underweight.  BMI is useful for:  · Identifying a weight problem that may be related to a medical condition or may increase the risk for medical problems.  · Promoting changes, such as changes in diet and exercise, to help reach a healthy weight. BMI screening can be repeated to see if these changes are working.  How is BMI calculated?  BMI involves measuring your weight in relation to your height. Both height and weight are measured, and the BMI is calculated from those numbers. This can be done either in English ([place] or metric measurements. Note that charts and online BMI calculators are available to help you find your BMI quickly and easily without having to do these calculations yourself.  To calculate your BMI in English ([place] measurements:    1. Measure your weight in pounds (lb).  2. Multiply the number of pounds by 703.  ? For example, for a person who weighs 180 lb, multiply that number by 703, which equals 126,540.  3. Measure your height in inches. Then multiply that number by itself to get a measurement called \"inches squared.\"  ? For example, for a person who is 70 " "inches tall, the \"inches squared\" measurement is 70 inches x 70 inches, which equals 4,900 inches squared.  4. Divide the total from step 2 (number of lb x 703) by the total from step 3 (inches squared): 126,540 ÷ 4,900 = 25.8. This is your BMI.  To calculate your BMI in metric measurements:  1. Measure your weight in kilograms (kg).  2. Measure your height in meters (m). Then multiply that number by itself to get a measurement called \"meters squared.\"  ? For example, for a person who is 1.75 m tall, the \"meters squared\" measurement is 1.75 m x 1.75 m, which is equal to 3.1 meters squared.  3. Divide the number of kilograms (your weight) by the meters squared number. In this example: 70 ÷ 3.1 = 22.6. This is your BMI.  What do the results mean?  BMI charts are used to identify whether you are underweight, normal weight, overweight, or obese. The following guidelines will be used:  · Underweight: BMI less than 18.5.  · Normal weight: BMI between 18.5 and 24.9.  · Overweight: BMI between 25 and 29.9.  · Obese: BMI of 30 or above.  Keep these notes in mind:  · Weight includes both fat and muscle, so someone with a muscular build, such as an athlete, may have a BMI that is higher than 24.9. In cases like these, BMI is not an accurate measure of body fat.  · To determine if excess body fat is the cause of a BMI of 25 or higher, further assessments may need to be done by a health care provider.  · BMI is usually interpreted in the same way for men and women.  Where to find more information  For more information about BMI, including tools to quickly calculate your BMI, go to these websites:  · Centers for Disease Control and Prevention: www.cdc.gov  · American Heart Association: www.heart.org  · National Heart, Lung, and Blood [place]: www.nhlbi.nih.gov  Summary  · Body mass index (BMI) is a number that is calculated from a person's weight and height.  · BMI may help estimate how much of a person's weight is composed of " fat. BMI can help identify those who may be at higher risk for certain medical problems.  · BMI can be measured using English measurements or metric measurements.  · BMI charts are used to identify whether you are underweight, normal weight, overweight, or obese.  This information is not intended to replace advice given to you by your health care provider. Make sure you discuss any questions you have with your health care provider.  Document Revised: 09/09/2020 Document Reviewed: 07/17/2020  Elsevier Patient Education © 2020 [employer]

## 2021-02-05 LAB
ALBUMIN UR-MCNC: <1.2 MG/DL
CREAT UR-MCNC: 50.3 MG/DL
MICROALBUMIN/CREAT UR: NORMAL MG/G{CREAT}

## 2021-02-07 DIAGNOSIS — R31.29 MICROHEMATURIA: Primary | ICD-10-CM

## 2021-02-08 ENCOUNTER — TELEPHONE (OUTPATIENT)
Dept: INTERNAL MEDICINE | Facility: CLINIC | Age: 76
End: 2021-02-08

## 2021-02-08 NOTE — TELEPHONE ENCOUNTER
LM for pt to call back.    ----- Message from Sindy Kruger MD sent at 2/7/2021  1:27 PM EST -----  Please let the patient know I have ordered an ultrasound of the kidneys since there was blood in her urine.  I printed her lab letter to mail.

## 2021-02-09 NOTE — TELEPHONE ENCOUNTER
PATIENT RETURNED PHONE CALL AND WAS GIVEN DR. PORTILLO'S INSTRUCTIONS.  PATIENT VERBALIZED UNDERSTANDING.    PATIENT ALSO WANTS TO BE CALLED  BACK ON HER LANDLINE PHONE [phone] REGARDING HER REFERRAL.

## 2021-02-19 ENCOUNTER — HOSPITAL ENCOUNTER (OUTPATIENT)
Dept: ULTRASOUND IMAGING | Facility: HOSPITAL | Age: 76
Discharge: HOME | End: 2021-02-19
Admitting: INTERNAL MEDICINE

## 2021-02-19 DIAGNOSIS — R31.29 MICROHEMATURIA: ICD-10-CM

## 2021-02-19 DIAGNOSIS — R31.9 HEMATURIA, UNSPECIFIED TYPE: Primary | ICD-10-CM

## 2021-02-19 PROCEDURE — 76775 US EXAM ABDO BACK WALL LIM: CPT

## 2021-03-08 ENCOUNTER — LAB (OUTPATIENT)
Dept: LAB | Facility: HOSPITAL | Age: 76
End: 2021-03-08

## 2021-03-08 DIAGNOSIS — R31.9 HEMATURIA, UNSPECIFIED TYPE: ICD-10-CM

## 2021-03-08 LAB
BACTERIA UR QL AUTO: NORMAL /HPF
BILIRUB UR QL STRIP: NEGATIVE
CLARITY UR: CLEAR
COLOR UR: YELLOW
GLUCOSE UR STRIP-MCNC: NEGATIVE MG/DL
HGB UR QL STRIP.AUTO: NEGATIVE
HYALINE CASTS UR QL AUTO: NORMAL /LPF
KETONES UR QL STRIP: NEGATIVE
LEUKOCYTE ESTERASE UR QL STRIP.AUTO: NEGATIVE
NITRITE UR QL STRIP: NEGATIVE
PH UR STRIP.AUTO: 6 [PH] (ref 5–8)
PROT UR QL STRIP: NEGATIVE
RBC # UR: NORMAL /HPF
REF LAB TEST METHOD: NORMAL
SP GR UR STRIP: 1.02 (ref 1–1.03)
SQUAMOUS #/AREA URNS HPF: NORMAL /HPF
UROBILINOGEN UR QL STRIP: NORMAL
WBC UR QL AUTO: NORMAL /HPF

## 2021-03-08 PROCEDURE — 81001 URINALYSIS AUTO W/SCOPE: CPT

## 2021-03-10 RX ORDER — PRAMOXINE HYDROCHLORIDE HYDROCORTISONE ACETATE 100; 100 MG/10G; MG/10G
AEROSOL, FOAM TOPICAL
Qty: 10 G | Refills: 0 | Status: SHIPPED | OUTPATIENT
Start: 2021-03-10 | End: 2022-03-04

## 2021-03-22 RX ORDER — METOPROLOL SUCCINATE 100 MG/1
TABLET, EXTENDED RELEASE ORAL
Qty: 90 TABLET | Refills: 0 | Status: SHIPPED | OUTPATIENT
Start: 2021-03-22 | End: 2021-08-09 | Stop reason: SDUPTHER

## 2021-08-09 ENCOUNTER — LAB (OUTPATIENT)
Dept: LAB | Facility: HOSPITAL | Age: 76
End: 2021-08-09

## 2021-08-09 ENCOUNTER — OFFICE VISIT (OUTPATIENT)
Dept: INTERNAL MEDICINE | Facility: CLINIC | Age: 76
End: 2021-08-09

## 2021-08-09 VITALS
HEART RATE: 59 BPM | HEIGHT: 62 IN | SYSTOLIC BLOOD PRESSURE: 122 MMHG | DIASTOLIC BLOOD PRESSURE: 76 MMHG | TEMPERATURE: 97.7 F | RESPIRATION RATE: 16 BRPM | WEIGHT: 158 LBS | BODY MASS INDEX: 29.08 KG/M2 | OXYGEN SATURATION: 98 %

## 2021-08-09 DIAGNOSIS — E78.00 HYPERCHOLESTEROLEMIA: ICD-10-CM

## 2021-08-09 DIAGNOSIS — Z11.59 ENCOUNTER FOR HEPATITIS C SCREENING TEST FOR LOW RISK PATIENT: ICD-10-CM

## 2021-08-09 DIAGNOSIS — I10 BENIGN ESSENTIAL HYPERTENSION: ICD-10-CM

## 2021-08-09 DIAGNOSIS — M85.89 OSTEOPENIA OF MULTIPLE SITES: ICD-10-CM

## 2021-08-09 DIAGNOSIS — Z00.00 ANNUAL PHYSICAL EXAM: ICD-10-CM

## 2021-08-09 DIAGNOSIS — Z00.00 MEDICARE ANNUAL WELLNESS VISIT, SUBSEQUENT: Primary | ICD-10-CM

## 2021-08-09 DIAGNOSIS — E66.3 OVERWEIGHT WITH BODY MASS INDEX (BMI) OF 29 TO 29.9 IN ADULT: ICD-10-CM

## 2021-08-09 DIAGNOSIS — N60.12 FIBROCYSTIC DISEASE OF LEFT BREAST: Chronic | ICD-10-CM

## 2021-08-09 DIAGNOSIS — Z23 NEED FOR VACCINATION: ICD-10-CM

## 2021-08-09 LAB
BASOPHILS # BLD AUTO: 0.05 10*3/MM3 (ref 0–0.2)
BASOPHILS NFR BLD AUTO: 0.8 % (ref 0–1.5)
BILIRUB UR QL STRIP: NEGATIVE
CLARITY UR: CLEAR
COLOR UR: YELLOW
DEPRECATED RDW RBC AUTO: 41.7 FL (ref 37–54)
EOSINOPHIL # BLD AUTO: 0.14 10*3/MM3 (ref 0–0.4)
EOSINOPHIL NFR BLD AUTO: 2.4 % (ref 0.3–6.2)
ERYTHROCYTE [DISTWIDTH] IN BLOOD BY AUTOMATED COUNT: 12.8 % (ref 12.3–15.4)
GLUCOSE UR STRIP-MCNC: NEGATIVE MG/DL
HCT VFR BLD AUTO: 42.5 % (ref 34–46.6)
HGB BLD-MCNC: 14.1 G/DL (ref 12–15.9)
HGB UR QL STRIP.AUTO: NEGATIVE
IMM GRANULOCYTES # BLD AUTO: 0.02 10*3/MM3 (ref 0–0.05)
IMM GRANULOCYTES NFR BLD AUTO: 0.3 % (ref 0–0.5)
KETONES UR QL STRIP: NEGATIVE
LEUKOCYTE ESTERASE UR QL STRIP.AUTO: ABNORMAL
LYMPHOCYTES # BLD AUTO: 1.34 10*3/MM3 (ref 0.7–3.1)
LYMPHOCYTES NFR BLD AUTO: 22.5 % (ref 19.6–45.3)
MCH RBC QN AUTO: 30.1 PG (ref 26.6–33)
MCHC RBC AUTO-ENTMCNC: 33.2 G/DL (ref 31.5–35.7)
MCV RBC AUTO: 90.6 FL (ref 79–97)
MONOCYTES # BLD AUTO: 0.38 10*3/MM3 (ref 0.1–0.9)
MONOCYTES NFR BLD AUTO: 6.4 % (ref 5–12)
NEUTROPHILS NFR BLD AUTO: 4.02 10*3/MM3 (ref 1.7–7)
NEUTROPHILS NFR BLD AUTO: 67.6 % (ref 42.7–76)
NITRITE UR QL STRIP: NEGATIVE
NRBC BLD AUTO-RTO: 0 /100 WBC (ref 0–0.2)
PH UR STRIP.AUTO: 7.5 [PH] (ref 5–8)
PLATELET # BLD AUTO: 264 10*3/MM3 (ref 140–450)
PMV BLD AUTO: 10.3 FL (ref 6–12)
PROT UR QL STRIP: NEGATIVE
RBC # BLD AUTO: 4.69 10*6/MM3 (ref 3.77–5.28)
SP GR UR STRIP: 1.01 (ref 1–1.03)
UROBILINOGEN UR QL STRIP: ABNORMAL
WBC # BLD AUTO: 5.95 10*3/MM3 (ref 3.4–10.8)

## 2021-08-09 PROCEDURE — 90732 PPSV23 VACC 2 YRS+ SUBQ/IM: CPT | Performed by: INTERNAL MEDICINE

## 2021-08-09 PROCEDURE — 80053 COMPREHEN METABOLIC PANEL: CPT

## 2021-08-09 PROCEDURE — 93000 ELECTROCARDIOGRAM COMPLETE: CPT | Performed by: INTERNAL MEDICINE

## 2021-08-09 PROCEDURE — 99397 PER PM REEVAL EST PAT 65+ YR: CPT | Performed by: INTERNAL MEDICINE

## 2021-08-09 PROCEDURE — 1170F FXNL STATUS ASSESSED: CPT | Performed by: INTERNAL MEDICINE

## 2021-08-09 PROCEDURE — 82570 ASSAY OF URINE CREATININE: CPT

## 2021-08-09 PROCEDURE — 81001 URINALYSIS AUTO W/SCOPE: CPT

## 2021-08-09 PROCEDURE — 84244 ASSAY OF RENIN: CPT

## 2021-08-09 PROCEDURE — G0009 ADMIN PNEUMOCOCCAL VACCINE: HCPCS | Performed by: INTERNAL MEDICINE

## 2021-08-09 PROCEDURE — 36415 COLL VENOUS BLD VENIPUNCTURE: CPT

## 2021-08-09 PROCEDURE — 82306 VITAMIN D 25 HYDROXY: CPT

## 2021-08-09 PROCEDURE — 1126F AMNT PAIN NOTED NONE PRSNT: CPT | Performed by: INTERNAL MEDICINE

## 2021-08-09 PROCEDURE — 82043 UR ALBUMIN QUANTITATIVE: CPT

## 2021-08-09 PROCEDURE — 82088 ASSAY OF ALDOSTERONE: CPT

## 2021-08-09 PROCEDURE — 85025 COMPLETE CBC W/AUTO DIFF WBC: CPT

## 2021-08-09 PROCEDURE — 80061 LIPID PANEL: CPT

## 2021-08-09 PROCEDURE — G0439 PPPS, SUBSEQ VISIT: HCPCS | Performed by: INTERNAL MEDICINE

## 2021-08-09 PROCEDURE — 84443 ASSAY THYROID STIM HORMONE: CPT

## 2021-08-09 PROCEDURE — 86803 HEPATITIS C AB TEST: CPT

## 2021-08-09 RX ORDER — METOPROLOL SUCCINATE 100 MG/1
TABLET, EXTENDED RELEASE ORAL
Qty: 146 TABLET | Refills: 1 | Status: SHIPPED | OUTPATIENT
Start: 2021-08-09 | End: 2021-09-24

## 2021-08-09 RX ORDER — DOXAZOSIN MESYLATE 4 MG/1
4 TABLET ORAL NIGHTLY
Qty: 90 TABLET | Refills: 1 | Status: SHIPPED | OUTPATIENT
Start: 2021-08-09 | End: 2021-09-20

## 2021-08-09 RX ORDER — PRAVASTATIN SODIUM 20 MG
20 TABLET ORAL EVERY EVENING
Qty: 90 TABLET | Refills: 1 | Status: SHIPPED | OUTPATIENT
Start: 2021-08-09 | End: 2022-02-14

## 2021-08-09 NOTE — PROGRESS NOTES
The ABCs of the Annual Wellness Visit  Initial Medicare Wellness Visit    Chief Complaint   Patient presents with   • Medicare Wellness-subsequent       Subjective   History of Present Illness:  Payal Nix is a 76 y.o. female who presents for an Initial Medicare Wellness Visit.    CHRONIC CONDITIONS:    BPs at home usually 140/70. This am was 160/77 but she states that she was probably rushing around before she checked it. Avoids salt in diet.   She takes all of her medicines regularly.  Takes lisinopril/hydrochlorothiazide in the morning and plain lisinopril in the evening, doxazosin in the evening, metoprolol 1/2 tablet in the morning and 1 whole tablet in the evening.    For hyperlipidemia, taking pravastatin every evening.  She eats a low-fat low sugar diet.  She is active walking and gardening.    Patient does admit to drinking a lot more caffeine(tea) in the summer.    HEALTH RISK ASSESSMENT    Recent Hospitalizations:  No hospitalization(s) within the last year.    Current Medical Providers:  Patient Care Team:  iSndy Kruger MD as PCP - General  Sindy Kruger MD as PCP - Family Medicine    Smoking Status:  Social History     Tobacco Use   Smoking Status Never Smoker   Smokeless Tobacco Never Used       Alcohol Consumption:  Social History     Substance and Sexual Activity   Alcohol Use Yes    Comment: once in a while       Depression Screen:   PHQ-2/PHQ-9 Depression Screening 8/9/2021   Little interest or pleasure in doing things 0   Feeling down, depressed, or hopeless 0   Trouble falling or staying asleep, or sleeping too much 0   Feeling tired or having little energy 0   Poor appetite or overeating 0   Feeling bad about yourself - or that you are a failure or have let yourself or your family down 0   Trouble concentrating on things, such as reading the newspaper or watching television 0   Moving or speaking so slowly that other people could have noticed. Or the opposite - being so fidgety  or restless that you have been moving around a lot more than usual 0   Thoughts that you would be better off dead, or of hurting yourself in some way 0   Total Score 0   If you checked off any problems, how difficult have these problems made it for you to do your work, take care of things at home, or get along with other people? Not difficult at all       Fall Risk Screen:  NICK Fall Risk Assessment was completed, and patient is at LOW risk for falls.Assessment completed on:8/9/2021    Health Habits and Functional and Cognitive Screening:  Functional & Cognitive Status 8/9/2021   Do you have difficulty preparing food and eating? No   Do you have difficulty bathing yourself, getting dressed or grooming yourself? No   Do you have difficulty using the toilet? No   Do you have difficulty moving around from place to place? No   Do you have trouble with steps or getting out of a bed or a chair? No   Current Diet Well Balanced Diet   Dental Exam Up to date   Eye Exam Up to date   Exercise (times per week) 3 times per week   Current Exercises Include Yard Work;Walking        Exercise Comment yoga   Current Exercise Activities Include -   Do you need help using the phone?  No   Are you deaf or do you have serious difficulty hearing?  No   Do you need help with transportation? No   Do you need help shopping? No   Do you need help preparing meals?  -   Do you need help with housework?  No   Do you need help with laundry? No   Do you need help taking your medications? No   Do you need help managing money? No   Do you ever drive or ride in a car without wearing a seat belt? No   Have you felt unusual stress, anger or loneliness in the last month? No   Who do you live with? Spouse   If you need help, do you have trouble finding someone available to you? No   Have you been bothered in the last four weeks by sexual problems? No   Do you have difficulty concentrating, remembering or making decisions? No       Does the patient have  evidence of cognitive impairment? No    Asprin use counseling:Taking ASA appropriately as indicated    Age-appropriate Screening Schedule:  Refer to the list below for future screening recommendations based on patient's age, sex and/or medical conditions. Orders for these recommended tests are listed in the plan section. The patient has been provided with a written plan.    Health Maintenance   Topic Date Due   • ZOSTER VACCINE (2 of 3) 06/13/2017   • TDAP/TD VACCINES (2 - Td or Tdap) 09/02/2021   • INFLUENZA VACCINE  10/01/2021   • LIPID PANEL  02/04/2022   • MAMMOGRAM  12/03/2022   • DXA SCAN  01/07/2023        The following portions of the patient's history were reviewed and updated as appropriate: allergies, current medications, past family history, past medical history, past social history, past surgical history and problem list.    Outpatient Medications Prior to Visit   Medication Sig Dispense Refill   • aspirin 81 MG tablet Take 1 tablet by mouth Daily.     • B Complex Vitamins (VITAMIN B COMPLEX PO) 1 po qd     • Calcium Citrate-Vitamin D 500-500 MG-UNIT chewable tablet 1 po 2 times a day     • Coenzyme Q10 (CO Q-10) 400 MG capsule 1 capsule every evening     • lisinopril (PRINIVIL,ZESTRIL) 20 MG tablet Take 1 tablet by mouth Every Night. 90 tablet 2   • lisinopril-hydrochlorothiazide (PRINZIDE,ZESTORETIC) 20-25 MG per tablet Take 1 tablet by mouth Daily. 90 tablet 3   • magnesium oxide (MAG-OX) 400 MG tablet Take 1 tablet by mouth Daily.     • Proctofoam HC 1-1 % rectal foam APPLY  CREAM RECTALLY AS NEEDED 10 g 0   • vitamin C (ASCORBIC ACID) 500 MG tablet Take 500 mg by mouth Daily.     • doxazosin (CARDURA) 4 MG tablet Take 1 tablet by mouth Every Night. 90 tablet 1   • metoprolol succinate XL (TOPROL-XL) 100 MG 24 hr tablet TAKE 1 TABLET BY MOUTH ONCE DAILY AT BEDTIME 90 tablet 0   • pravastatin (PRAVACHOL) 20 MG tablet Take 1 tablet by mouth Every Evening. 90 tablet 1     No facility-administered  medications prior to visit.       Patient Active Problem List   Diagnosis   • Hemorrhoids   • Primary osteoarthritis involving multiple joints   • Hypercholesterolemia   • Hypertensive retinopathy   • Benign essential hypertension   • Osteopenia of multiple sites   • Menopausal and postmenopausal disorder   • Medicare annual wellness visit, subsequent   • Annual physical exam   • Overweight with body mass index (BMI) of 29 to 29.9 in adult   • Fibrocystic disease of left breast       Advanced Care Planning:  ACP discussion was held with the patient during this visit. Patient has an advance directive (not in EMR), copy requested.    Review of Systems   Constitutional: Negative for chills, fatigue and fever.   HENT: Negative for congestion, ear pain, hearing loss and sinus pressure.    Eyes: Negative for visual disturbance.   Respiratory: Negative for cough, chest tightness, shortness of breath and wheezing.    Cardiovascular: Negative for chest pain, palpitations and leg swelling.   Gastrointestinal: Negative for abdominal pain, blood in stool and constipation.   Endocrine: Negative for cold intolerance and heat intolerance.   Genitourinary: Negative for dysuria and frequency.   Musculoskeletal: Negative for arthralgias, back pain and gait problem.   Skin: Negative for color change.   Allergic/Immunologic: Negative for environmental allergies.   Neurological: Negative for dizziness and headaches.   Hematological: Negative for adenopathy. Does not bruise/bleed easily.   Psychiatric/Behavioral: Negative for dysphoric mood, sleep disturbance and suicidal ideas. The patient is not nervous/anxious.        Compared to one year ago, the patient feels her physical health is the same.  Compared to one year ago, the patient feels her mental health is the same.    Reviewed chart for potential of high risk medication in the elderly: yes  Reviewed chart for potential of harmful drug interactions in the elderly:yes    Objective   "       Vitals:    08/09/21 0900   BP: 122/76   BP Location: Left arm   Patient Position: Sitting   Pulse: 59   Resp: 16   Temp: 97.7 °F (36.5 °C)   TempSrc: Infrared   SpO2: 98%   Weight: 71.7 kg (158 lb)   Height: 157.5 cm (62\")   PainSc: 0-No pain       Body mass index is 28.9 kg/m².  Discussed the patient's BMI with her. The BMI is above average; BMI management plan is completed.    Physical Exam  Vitals and nursing note reviewed.   Constitutional:       Appearance: She is well-developed and overweight.   Eyes:      Conjunctiva/sclera: Conjunctivae normal.      Pupils: Pupils are equal, round, and reactive to light.   Neck:      Thyroid: No thyromegaly.   Cardiovascular:      Rate and Rhythm: Normal rate and regular rhythm.      Heart sounds: Normal heart sounds. No murmur heard.     Pulmonary:      Effort: Pulmonary effort is normal.      Breath sounds: Normal breath sounds. No wheezing.   Chest:      Breasts:         Right: No inverted nipple, mass, nipple discharge, skin change or tenderness.         Left: No inverted nipple, mass, nipple discharge, skin change or tenderness.       Abdominal:      General: Bowel sounds are normal. There is no distension.      Palpations: Abdomen is soft. There is no mass.      Tenderness: There is no abdominal tenderness.   Musculoskeletal:         General: No tenderness. Normal range of motion.      Cervical back: Normal range of motion and neck supple.   Lymphadenopathy:      Cervical: No cervical adenopathy.      Upper Body:      Right upper body: No supraclavicular, axillary or pectoral adenopathy.      Left upper body: No supraclavicular, axillary or pectoral adenopathy.   Skin:     General: Skin is warm and dry.      Findings: No rash.   Neurological:      Mental Status: She is alert and oriented to person, place, and time.      Cranial Nerves: No cranial nerve deficit.      Sensory: No sensory deficit.      Coordination: Coordination normal.      Gait: Gait normal. "   Psychiatric:         Attention and Perception: Attention normal.         Mood and Affect: Mood normal.         Speech: Speech normal.         Behavior: Behavior normal.         Thought Content: Thought content normal.         Cognition and Memory: Cognition normal.         Judgment: Judgment normal.       ECG 12 Lead    Date/Time: 8/9/2021 11:32 AM  Performed by: Sindy Kruger MD  Authorized by: Sindy Kruger MD   Comparison: compared with previous ECG   Similar to previous ECG  Rhythm: sinus rhythm  Rate: normal  BPM: 62  Conduction: 1st degree AV block  ST Segments: ST segments normal  T Waves: T waves normal  QRS axis: normal    Clinical impression: normal ECG                    Assessment/Plan   Medicare Risks and Personalized Health Plan  CMS Preventative Services Quick Reference  Cardiovascular risk  Osteoporosis Risk    The above risks/problems have been discussed with the patient.  Pertinent information has been shared with the patient in the After Visit Summary.  Follow up plans and orders are seen below in the Assessment/Plan Section.  Patient Instructions   Problem List Items Addressed This Visit        Cardiac and Vasculature    Hypercholesterolemia    Overview     8/9/2021 Sindy Kruger MD     Continue pravastatin every evening.Continue low-fat diet and regular exercise.               Relevant Medications    pravastatin (PRAVACHOL) 20 MG tablet    Other Relevant Orders    CBC & Differential    Comprehensive Metabolic Panel    Lipid Panel    TSH    Benign essential hypertension    Overview     8/9/2021 Sindy Kruger MD    Continue metoprolol XL  1/2 tablet (50mg) every morning and continue 1 whole tablet (100mg) every evening.  Continue doxazosin 4 mg every evening.  Continue lisinopril/hydrochlorothiazide 20/25 mg every morning and lisinopril tablet 20 mg every evening.  Continue to avoid salt in the diet and avoid sodas.    Continue to monitor blood pressures at home.          Relevant Medications    lisinopril (PRINIVIL,ZESTRIL) 20 MG tablet    lisinopril-hydrochlorothiazide (PRINZIDE,ZESTORETIC) 20-25 MG per tablet    metoprolol succinate XL (TOPROL-XL) 100 MG 24 hr tablet    doxazosin (CARDURA) 4 MG tablet    Other Relevant Orders    Microalbumin / Creatinine Urine Ratio - Urine, Clean Catch    Urinalysis With Microscopic - Urine, Clean Catch    Aldosterone / Renin Ratio    ECG 12 Lead       Genitourinary and Reproductive     Fibrocystic disease of left breast (Chronic)    Overview     8/9/2021 Sindy Kruger MD    Tender mobile fibrocystic area in the left breast in the upper outer quadrant.    Decrease caffeine.            Health Encounters    Medicare annual wellness visit, subsequent - Primary    Overview     8/9/2021 Sindy Kruger MD    Mammogram and DEXA and colonoscopy up-to-date.      Pneumovax 23 Valent given today.    She was advised to get her flu shot in another month, then Tdap a month after that, and Shingrix.         Annual physical exam    Overview     8/9/2021 Sindy Kruger MD    Mammogram and DEXA and colonoscopy up-to-date.      Pneumovax 23 Valent given today.    She was advised to get her flu shot in another month, then Tdap a month after that, and Shingrix.            Musculoskeletal and Injuries    Osteopenia of multiple sites    Overview     8/9/2021 Sindy Kruger MD    Last DEXA 1/2021 showed some mild improvement in osteopenia T-scores of the hip.  The lowest T score is -1.8 in the femoral neck.  Spine T-scores decreased slightly but is still very mild osteopenia at -1.1.    Continue daily weightbearing exercises including exercises on the feet and using small hand weights at home.  Continue calcium with vitamin D3.    Recheck DEXA in 2 years.           Relevant Orders    Vitamin D 25 Hydroxy       Other    Overweight with body mass index (BMI) of 29 to 29.9 in adult    Overview     8/9/2021 Sindy Kruger MD    Try to get more cardio  exercise, 20 to 30 minutes a day keeping the heart rate up.  Use the exercise bike more often.  Use small hand weights about 5 minutes a day.    Continue low-fat healthy diet.           Other Visit Diagnoses     Need for vaccination        Relevant Orders    Pneumococcal Polysaccharide Vaccine 23-Valent Greater Than or Equal To 3yo Subcutaneous / IM (Completed)    Encounter for hepatitis C screening test for low risk patient        Relevant Orders    Hepatitis C Antibody           Follow Up:  Return in about 6 months (around 2/9/2022) for recheck fasting.     An After Visit Summary and PPPS were given to the patient.

## 2021-08-09 NOTE — PATIENT INSTRUCTIONS
Patient Instructions   Problem List Items Addressed This Visit        Cardiac and Vasculature    Hypercholesterolemia    Overview     8/9/2021 Sindy Kruger MD     Continue pravastatin every evening.Continue low-fat diet and regular exercise.               Relevant Medications    pravastatin (PRAVACHOL) 20 MG tablet    Other Relevant Orders    CBC & Differential    Comprehensive Metabolic Panel    Lipid Panel    TSH    Benign essential hypertension    Overview     8/9/2021 Sindy Kruger MD    Continue metoprolol XL  1/2 tablet (50mg) every morning and continue 1 whole tablet (100mg) every evening.  Continue doxazosin 4 mg every evening.  Continue lisinopril/hydrochlorothiazide 20/25 mg every morning and lisinopril tablet 20 mg every evening.  Continue to avoid salt in the diet and avoid sodas.    Continue to monitor blood pressures at home.         Relevant Medications    lisinopril (PRINIVIL,ZESTRIL) 20 MG tablet    lisinopril-hydrochlorothiazide (PRINZIDE,ZESTORETIC) 20-25 MG per tablet    metoprolol succinate XL (TOPROL-XL) 100 MG 24 hr tablet    doxazosin (CARDURA) 4 MG tablet    Other Relevant Orders    Microalbumin / Creatinine Urine Ratio - Urine, Clean Catch    Urinalysis With Microscopic - Urine, Clean Catch    Aldosterone / Renin Ratio    ECG 12 Lead       Genitourinary and Reproductive     Fibrocystic disease of left breast (Chronic)    Overview     8/9/2021 Sindy Kruger MD    Tender mobile fibrocystic area in the left breast in the upper outer quadrant.    Decrease caffeine.            Health Encounters    Medicare annual wellness visit, subsequent - Primary    Overview     8/9/2021 Sindy Kruger MD    Mammogram and DEXA and colonoscopy up-to-date.      Pneumovax 23 Valent given today.    She was advised to get her flu shot in another month, then Tdap a month after that, and Shingrix.         Annual physical exam    Overview     8/9/2021 Sindy Kruger MD    Mammogram and DEXA and  colonoscopy up-to-date.      Pneumovax 23 Valent given today.    She was advised to get her flu shot in another month, then Tdap a month after that, and Shingrix.            Musculoskeletal and Injuries    Osteopenia of multiple sites    Overview     8/9/2021 Sindy Kruger MD    Last DEXA 1/2021 showed some mild improvement in osteopenia T-scores of the hip.  The lowest T score is -1.8 in the femoral neck.  Spine T-scores decreased slightly but is still very mild osteopenia at -1.1.    Continue daily weightbearing exercises including exercises on the feet and using small hand weights at home.  Continue calcium with vitamin D3.    Recheck DEXA in 2 years.           Relevant Orders    Vitamin D 25 Hydroxy       Other    Overweight with body mass index (BMI) of 29 to 29.9 in adult    Overview     8/9/2021 Sindy Kruger MD    Try to get more cardio exercise, 20 to 30 minutes a day keeping the heart rate up.  Use the exercise bike more often.  Use small hand weights about 5 minutes a day.    Continue low-fat healthy diet.           Other Visit Diagnoses     Need for vaccination        Relevant Orders    Pneumococcal Polysaccharide Vaccine 23-Valent Greater Than or Equal To 1yo Subcutaneous / IM (Completed)    Encounter for hepatitis C screening test for low risk patient        Relevant Orders    Hepatitis C Antibody          BMI for Adults  What is BMI?  Body mass index (BMI) is a number that is calculated from a person's weight and height. BMI can help estimate how much of a person's weight is composed of fat. BMI does not measure body fat directly. Rather, it is an alternative to procedures that directly measure body fat, which can be difficult and expensive.  BMI can help identify people who may be at higher risk for certain medical problems.  What are BMI measurements used for?  BMI is used as a screening tool to identify possible weight problems. It helps determine whether a person is obese, overweight, a  "healthy weight, or underweight.  BMI is useful for:  · Identifying a weight problem that may be related to a medical condition or may increase the risk for medical problems.  · Promoting changes, such as changes in diet and exercise, to help reach a healthy weight. BMI screening can be repeated to see if these changes are working.  How is BMI calculated?  BMI involves measuring your weight in relation to your height. Both height and weight are measured, and the BMI is calculated from those numbers. This can be done either in English ([place] or metric measurements. Note that charts and online BMI calculators are available to help you find your BMI quickly and easily without having to do these calculations yourself.  To calculate your BMI in English ([place] measurements:    1. Measure your weight in pounds (lb).  2. Multiply the number of pounds by 703.  ? For example, for a person who weighs 180 lb, multiply that number by 703, which equals 126,540.  3. Measure your height in inches. Then multiply that number by itself to get a measurement called \"inches squared.\"  ? For example, for a person who is 70 inches tall, the \"inches squared\" measurement is 70 inches x 70 inches, which equals 4,900 inches squared.  4. Divide the total from step 2 (number of lb x 703) by the total from step 3 (inches squared): 126,540 ÷ 4,900 = 25.8. This is your BMI.  To calculate your BMI in metric measurements:  1. Measure your weight in kilograms (kg).  2. Measure your height in meters (m). Then multiply that number by itself to get a measurement called \"meters squared.\"  ? For example, for a person who is 1.75 m tall, the \"meters squared\" measurement is 1.75 m x 1.75 m, which is equal to 3.1 meters squared.  3. Divide the number of kilograms (your weight) by the meters squared number. In this example: 70 ÷ 3.1 = 22.6. This is your BMI.  What do the results mean?  BMI charts are used to identify whether you are underweight, normal weight, " overweight, or obese. The following guidelines will be used:  · Underweight: BMI less than 18.5.  · Normal weight: BMI between 18.5 and 24.9.  · Overweight: BMI between 25 and 29.9.  · Obese: BMI of 30 or above.  Keep these notes in mind:  · Weight includes both fat and muscle, so someone with a muscular build, such as an athlete, may have a BMI that is higher than 24.9. In cases like these, BMI is not an accurate measure of body fat.  · To determine if excess body fat is the cause of a BMI of 25 or higher, further assessments may need to be done by a health care provider.  · BMI is usually interpreted in the same way for men and women.  Where to find more information  For more information about BMI, including tools to quickly calculate your BMI, go to these websites:  · Centers for Disease Control and Prevention: www.cdc.gov  · American Heart Association: www.heart.org  · National Heart, Lung, and Blood [place]: www.nhlbi.nih.gov  Summary  · Body mass index (BMI) is a number that is calculated from a person's weight and height.  · BMI may help estimate how much of a person's weight is composed of fat. BMI can help identify those who may be at higher risk for certain medical problems.  · BMI can be measured using English measurements or metric measurements.  · BMI charts are used to identify whether you are underweight, normal weight, overweight, or obese.  This information is not intended to replace advice given to you by your health care provider. Make sure you discuss any questions you have with your health care provider.  Document Revised: 09/09/2020 Document Reviewed: 07/17/2020  [employer] Patient Education © 2021 [employer]    Exercising to Lose Weight  Exercise is structured, repetitive physical activity to improve fitness and health. Getting regular exercise is important for everyone. It is especially important if you are overweight. Being overweight increases your risk of heart disease, stroke,  diabetes, high blood pressure, and several types of cancer. Reducing your calorie intake and exercising can help you lose weight.  Exercise is usually categorized as moderate or vigorous intensity. To lose weight, most people need to do a certain amount of moderate-intensity or vigorous-intensity exercise each week.  Moderate-intensity exercise    Moderate-intensity exercise is any activity that gets you moving enough to burn at least three times more energy (calories) than if you were sitting.  Examples of moderate exercise include:  · Walking a mile in 15 minutes.  · Doing light yard work.  · Biking at an easy pace.  Most people should get at least 150 minutes (2 hours and 30 minutes) a week of moderate-intensity exercise to maintain their body weight.  Vigorous-intensity exercise  Vigorous-intensity exercise is any activity that gets you moving enough to burn at least six times more calories than if you were sitting. When you exercise at this intensity, you should be working hard enough that you are not able to carry on a conversation.  Examples of vigorous exercise include:  · Running.  · Playing a team sport, such as football, basketball, and soccer.  · Jumping rope.  Most people should get at least 75 minutes (1 hour and 15 minutes) a week of vigorous-intensity exercise to maintain their body weight.  How can exercise affect me?  When you exercise enough to burn more calories than you eat, you lose weight. Exercise also reduces body fat and builds muscle. The more muscle you have, the more calories you burn. Exercise also:  · Improves mood.  · Reduces stress and tension.  · Improves your overall fitness, flexibility, and endurance.  · Increases bone strength.  The amount of exercise you need to lose weight depends on:  · Your age.  · The type of exercise.  · Any health conditions you have.  · Your overall physical ability.  Talk to your health care provider about how much exercise you need and what types of  activities are safe for you.  What actions can I take to lose weight?  Nutrition    · Make changes to your diet as told by your health care provider or diet and nutrition specialist (dietitian). This may include:  ? Eating fewer calories.  ? Eating more protein.  ? Eating less unhealthy fats.  ? Eating a diet that includes fresh fruits and vegetables, whole grains, low-fat dairy products, and lean protein.  ? Avoiding foods with added fat, salt, and sugar.  · Drink plenty of water while you exercise to prevent dehydration or heat stroke.  Activity  · Choose an activity that you enjoy and set realistic goals. Your health care provider can help you make an exercise plan that works for you.  · Exercise at a moderate or vigorous intensity most days of the week.  ? The intensity of exercise may vary from person to person. You can tell how intense a workout is for you by paying attention to your breathing and heartbeat. Most people will notice their breathing and heartbeat get faster with more intense exercise.  · Do resistance training twice each week, such as:  ? Push-ups.  ? Sit-ups.  ? Lifting weights.  ? Using resistance bands.  · Getting short amounts of exercise can be just as helpful as long structured periods of exercise. If you have trouble finding time to exercise, try to include exercise in your daily routine.  ? Get up, stretch, and walk around every 30 minutes throughout the day.  ? Go for a walk during your lunch break.  ? Park your car farther away from your destination.  ? If you take public transportation, get off one stop early and walk the rest of the way.  ? Make phone calls while standing up and walking around.  ? Take the stairs instead of elevators or escalators.  · Wear comfortable clothes and shoes with good support.  · Do not exercise so much that you hurt yourself, feel dizzy, or get very short of breath.  Where to find more information  · [place] Department of Health and Human Services:  www.hhs.gov  · Centers for Disease Control and Prevention (CDC): www.cdc.gov  Contact a health care provider:  · Before starting a new exercise program.  · If you have questions or concerns about your weight.  · If you have a medical problem that keeps you from exercising.  Get help right away if you have any of the following while exercising:  · Injury.  · Dizziness.  · Difficulty breathing or shortness of breath that does not go away when you stop exercising.  · Chest pain.  · Rapid heartbeat.  Summary  · Being overweight increases your risk of heart disease, stroke, diabetes, high blood pressure, and several types of cancer.  · Losing weight happens when you burn more calories than you eat.  · Reducing the amount of calories you eat in addition to getting regular moderate or vigorous exercise each week helps you lose weight.  This information is not intended to replace advice given to you by your health care provider. Make sure you discuss any questions you have with your health care provider.  Document Revised: 04/15/2021 Document Reviewed: 04/15/2021  [employer] Patient Education © 2021 [employer]

## 2021-08-10 LAB
25(OH)D3 SERPL-MCNC: 37.6 NG/ML (ref 30–100)
ALBUMIN SERPL-MCNC: 4.5 G/DL (ref 3.5–5.2)
ALBUMIN UR-MCNC: <1.2 MG/DL
ALBUMIN/GLOB SERPL: 1.4 G/DL
ALP SERPL-CCNC: 68 U/L (ref 39–117)
ALT SERPL W P-5'-P-CCNC: 13 U/L (ref 1–33)
ANION GAP SERPL CALCULATED.3IONS-SCNC: 13 MMOL/L (ref 5–15)
AST SERPL-CCNC: 20 U/L (ref 1–32)
BACTERIA UR QL AUTO: ABNORMAL /HPF
BILIRUB SERPL-MCNC: 0.8 MG/DL (ref 0–1.2)
BUN SERPL-MCNC: 13 MG/DL (ref 8–23)
BUN/CREAT SERPL: 16.3 (ref 7–25)
CALCIUM SPEC-SCNC: 10 MG/DL (ref 8.6–10.5)
CHLORIDE SERPL-SCNC: 101 MMOL/L (ref 98–107)
CHOLEST SERPL-MCNC: 176 MG/DL (ref 0–200)
CO2 SERPL-SCNC: 27 MMOL/L (ref 22–29)
CREAT SERPL-MCNC: 0.8 MG/DL (ref 0.57–1)
CREAT UR-MCNC: 89.8 MG/DL
GFR SERPL CREATININE-BSD FRML MDRD: 70 ML/MIN/1.73
GLOBULIN UR ELPH-MCNC: 3.2 GM/DL
GLUCOSE SERPL-MCNC: 91 MG/DL (ref 65–99)
HCV AB SER DONR QL: NORMAL
HDLC SERPL-MCNC: 71 MG/DL (ref 40–60)
HYALINE CASTS UR QL AUTO: ABNORMAL /LPF
LDLC SERPL CALC-MCNC: 90 MG/DL (ref 0–100)
LDLC/HDLC SERPL: 1.25 {RATIO}
MICROALBUMIN/CREAT UR: NORMAL MG/G{CREAT}
MUCOUS THREADS URNS QL MICRO: ABNORMAL /HPF
POTASSIUM SERPL-SCNC: 3.9 MMOL/L (ref 3.5–5.2)
PROT SERPL-MCNC: 7.7 G/DL (ref 6–8.5)
RBC # UR: ABNORMAL /HPF
REF LAB TEST METHOD: ABNORMAL
SODIUM SERPL-SCNC: 141 MMOL/L (ref 136–145)
SQUAMOUS #/AREA URNS HPF: ABNORMAL /HPF
TRIGL SERPL-MCNC: 81 MG/DL (ref 0–150)
TSH SERPL DL<=0.05 MIU/L-ACNC: 3.32 UIU/ML (ref 0.27–4.2)
VLDLC SERPL-MCNC: 15 MG/DL (ref 5–40)
WBC UR QL AUTO: ABNORMAL /HPF

## 2021-08-11 ENCOUNTER — TELEPHONE (OUTPATIENT)
Dept: INTERNAL MEDICINE | Facility: CLINIC | Age: 76
End: 2021-08-11

## 2021-08-11 NOTE — TELEPHONE ENCOUNTER
Spoke patient re: pneumonia vaccine 08/09/21 and indicated fever 100oral but no fever now.   Patient indicated swollen around elbow with redness and heat and she only take otc tylenol.  Pt has no other sx and please advise

## 2021-08-11 NOTE — TELEPHONE ENCOUNTER
Continue taking Tylenol for the pain.  Elevate the arm on a pillow while sitting.  May use moist warm compress.  Let us know if not improving.  Or if she has another fever.

## 2021-08-11 NOTE — TELEPHONE ENCOUNTER
Caller: Payal Nix    Relationship: Self    Best call back number: [phone]    Who are you requesting to speak with (clinical staff, provider,  specific staff member): CLINICAL STAFF    What was the call regarding: PATIENT STATED SHE RECEIVED A PNEUMONIA VACCINE AT THE OFFICE ON 8/9/21 AND HER ARM IS SWOLLEN, RED, AND HOT TO THE TOUCH. PATIENT STATED SHE HAD A FEVER YESTERDAY BUT THAT IS GONE NOW. PATIENT WOULD LIKE TO KNOW WHAT SHE SHOULD DO TO HELP WITH THIS.    Do you require a callback: YES

## 2021-08-12 NOTE — TELEPHONE ENCOUNTER
Patient called back advised per note she said she steele already been doing what you suggested and it is better the pain is completely gone and it isn't as hot as it was she said she can move it now but she said she still has swelling above the elbow and now it has moved down past the elbow she mentioned should she call back on Monday if not better and schedule an appt iinformed her that she can but if it gets worse she can also call tomorrow and we'll get her in for an appt. She verbalized understanding

## 2021-08-13 LAB
ALDOST SERPL-MCNC: 8.3 NG/DL (ref 0–30)
ALDOST/RENIN PLAS-RTO: >49.7 {RATIO} (ref 0–30)
RENIN PLAS-CCNC: <0.167 NG/ML/HR (ref 0.17–5.38)

## 2021-08-15 DIAGNOSIS — I10 BENIGN ESSENTIAL HYPERTENSION: Primary | ICD-10-CM

## 2021-08-15 RX ORDER — SPIRONOLACTONE 100 MG/1
100 TABLET, FILM COATED ORAL DAILY
Qty: 90 TABLET | Refills: 1 | Status: SHIPPED | OUTPATIENT
Start: 2021-08-15 | End: 2022-05-02

## 2021-08-16 ENCOUNTER — TELEPHONE (OUTPATIENT)
Dept: INTERNAL MEDICINE | Facility: CLINIC | Age: 76
End: 2021-08-16

## 2021-08-16 NOTE — TELEPHONE ENCOUNTER
Pt is returning Mendy's call. Pt will have to call us back as she is taking her dog to the vet right now.

## 2021-09-02 ENCOUNTER — LAB (OUTPATIENT)
Dept: LAB | Facility: HOSPITAL | Age: 76
End: 2021-09-02

## 2021-09-02 DIAGNOSIS — I10 BENIGN ESSENTIAL HYPERTENSION: ICD-10-CM

## 2021-09-02 PROCEDURE — 80048 BASIC METABOLIC PNL TOTAL CA: CPT

## 2021-09-03 LAB
ANION GAP SERPL CALCULATED.3IONS-SCNC: 11.7 MMOL/L (ref 5–15)
BUN SERPL-MCNC: 12 MG/DL (ref 8–23)
BUN/CREAT SERPL: 12.2 (ref 7–25)
CALCIUM SPEC-SCNC: 10.4 MG/DL (ref 8.6–10.5)
CHLORIDE SERPL-SCNC: 104 MMOL/L (ref 98–107)
CO2 SERPL-SCNC: 24.3 MMOL/L (ref 22–29)
CREAT SERPL-MCNC: 0.98 MG/DL (ref 0.57–1)
GFR SERPL CREATININE-BSD FRML MDRD: 55 ML/MIN/1.73
GLUCOSE SERPL-MCNC: 82 MG/DL (ref 65–99)
POTASSIUM SERPL-SCNC: 4.6 MMOL/L (ref 3.5–5.2)
SODIUM SERPL-SCNC: 140 MMOL/L (ref 136–145)

## 2021-09-20 RX ORDER — DOXAZOSIN MESYLATE 4 MG/1
TABLET ORAL
Qty: 90 TABLET | Refills: 1 | Status: SHIPPED | OUTPATIENT
Start: 2021-09-20 | End: 2022-03-25

## 2021-09-24 RX ORDER — METOPROLOL SUCCINATE 100 MG/1
TABLET, EXTENDED RELEASE ORAL
Qty: 135 TABLET | Refills: 1 | Status: SHIPPED | OUTPATIENT
Start: 2021-09-24 | End: 2022-04-07

## 2021-09-24 NOTE — TELEPHONE ENCOUNTER
Rx Refill Note  Requested Prescriptions     Pending Prescriptions Disp Refills   • metoprolol succinate XL (TOPROL-XL) 100 MG 24 hr tablet [Pharmacy Med Name: Metoprolol Succinate  MG Oral Tablet Extended Release 24 Hour] 135 tablet 0     Sig: TAKE 1/2 (ONE-HALF) TABLET BY MOUTH IN THE MORNING AND 1 IN THE EVENING      Last office visit with prescribing clinician: 8/9/2021      Next office visit with prescribing clinician: 2/21/2022            Kathia Padron LPN  09/24/21, 13:01 EDT

## 2021-10-04 ENCOUNTER — LAB (OUTPATIENT)
Dept: LAB | Facility: HOSPITAL | Age: 76
End: 2021-10-04

## 2021-10-04 ENCOUNTER — OFFICE VISIT (OUTPATIENT)
Dept: INTERNAL MEDICINE | Facility: CLINIC | Age: 76
End: 2021-10-04

## 2021-10-04 VITALS
HEART RATE: 64 BPM | WEIGHT: 152 LBS | BODY MASS INDEX: 27.97 KG/M2 | HEIGHT: 62 IN | TEMPERATURE: 98 F | DIASTOLIC BLOOD PRESSURE: 70 MMHG | SYSTOLIC BLOOD PRESSURE: 146 MMHG

## 2021-10-04 DIAGNOSIS — R21 RASH: ICD-10-CM

## 2021-10-04 DIAGNOSIS — R21 RASH: Primary | ICD-10-CM

## 2021-10-04 PROCEDURE — 99213 OFFICE O/P EST LOW 20 MIN: CPT | Performed by: NURSE PRACTITIONER

## 2021-10-04 PROCEDURE — 86618 LYME DISEASE ANTIBODY: CPT

## 2021-10-04 RX ORDER — DOXYCYCLINE HYCLATE 100 MG/1
100 CAPSULE ORAL 2 TIMES DAILY
Qty: 20 CAPSULE | Refills: 0 | Status: SHIPPED | OUTPATIENT
Start: 2021-10-04 | End: 2021-10-14

## 2021-10-04 NOTE — PROGRESS NOTES
Payal Nix  1945  [id number]  Patient Care Team:  Sindy Kruger MD as PCP - General  Sindy Kruger MD as PCP - Family Medicine    Payla Nix is a pleasant 76 y.o. female who presents for evaluation of Insect Bite    Chief Complaint   Patient presents with   • Insect Bite       HPI:   Noticed rash/redness 2 days ago.  She has had multiple tics this summer.  Past Medical History:   Diagnosis Date   • Arthritis    • Cervical cancer (HCC)     Dr. Orr   • Cervical cancer (HCC)     in situ; Hysterectomy 1982   • Chronic right-sided thoracic back pain 1/24/2019   • Fatigue 1/24/2019   • Fibrocystic breast    • Hyperlipidemia    • Menopausal state      Past Surgical History:   Procedure Laterality Date   • BREAST CYST ASPIRATION     • CATARACT EXTRACTION, BILATERAL Bilateral     2021   • D & C WITH CERVICAL CONIZATION  1981   • HYSTERECTOMY  1982    Ovaries intact. cervical cancer in situ     Family History   Problem Relation Age of Onset   • Stroke Mother    • Tuberculosis Maternal Aunt    • Tuberculosis Maternal Grandmother    • Breast cancer Neg Hx    • Endometrial cancer Neg Hx    • Ovarian cancer Neg Hx      Social History     Tobacco Use   Smoking Status Never Smoker   Smokeless Tobacco Never Used     Allergies   Allergen Reactions   • Amlodipine Besylate Other (See Comments)     edema  Other reaction(s): edema   • Benazepril Hcl Swelling   • Ciprofloxacin Other (See Comments)     Nausea and chills   • Ciprofloxacin Hcl Other (See Comments)     Nausea and chills   • Levofloxacin Nausea And Vomiting     Other reaction(s): nausea, vomiting   • Lotrel [Amlodipine Besy-Benazepril Hcl] Swelling     unknown       Current Outpatient Medications:   •  aspirin 81 MG tablet, Take 1 tablet by mouth Daily., Disp: , Rfl:   •  B Complex Vitamins (VITAMIN B COMPLEX PO), 1 po qd, Disp: , Rfl:   •  Calcium Citrate-Vitamin D 500-500 MG-UNIT chewable tablet, 1 po 2 times a day, Disp: , Rfl:   •  Coenzyme Q10  "(CO Q-10) 400 MG capsule, 1 capsule every evening, Disp: , Rfl:   •  doxazosin (CARDURA) 4 MG tablet, TAKE 1 TABLET BY MOUTH ONCE DAILY AT NIGHT, Disp: 90 tablet, Rfl: 1  •  lisinopril (PRINIVIL,ZESTRIL) 20 MG tablet, Take 1 tablet by mouth Every Night., Disp: 90 tablet, Rfl: 2  •  magnesium oxide (MAG-OX) 400 MG tablet, Take 1 tablet by mouth Daily., Disp: , Rfl:   •  metoprolol succinate XL (TOPROL-XL) 100 MG 24 hr tablet, TAKE 1/2 (ONE-HALF) TABLET BY MOUTH IN THE MORNING AND 1 IN THE EVENING, Disp: 135 tablet, Rfl: 1  •  pravastatin (PRAVACHOL) 20 MG tablet, Take 1 tablet by mouth Every Evening., Disp: 90 tablet, Rfl: 1  •  Proctofoam HC 1-1 % rectal foam, APPLY  CREAM RECTALLY AS NEEDED, Disp: 10 g, Rfl: 0  •  spironolactone (Aldactone) 100 MG tablet, Take 1 tablet by mouth Daily., Disp: 90 tablet, Rfl: 1  •  vitamin C (ASCORBIC ACID) 500 MG tablet, Take 500 mg by mouth Daily., Disp: , Rfl:   •  doxycycline (VIBRAMYCIN) 100 MG capsule, Take 1 capsule by mouth 2 (Two) Times a Day for 10 days., Disp: 20 capsule, Rfl: 0    Review of Systems  /70 (BP Location: Left arm, Patient Position: Sitting, Cuff Size: Adult)   Pulse 64   Temp 98 °F (36.7 °C) (Temporal)   Ht 157.5 cm (62.01\")   Wt 68.9 kg (152 lb)   BMI 27.79 kg/m²     Physical Exam  Vitals reviewed.   Constitutional:       Appearance: She is well-developed.   Pulmonary:      Effort: Pulmonary effort is normal.   Skin:     General: Skin is warm and dry.          Neurological:      Mental Status: She is alert.   Psychiatric:         Behavior: Behavior normal.         Procedures    Results Review:  None    PHQ-9 Total Score:      Assessment/Plan:  Diagnoses and all orders for this visit:    1. Rash (Primary)  Comments:  Suspect Lyme, doxycycline x10 days, labs today per patient request  Orders:  -     doxycycline (VIBRAMYCIN) 100 MG capsule; Take 1 capsule by mouth 2 (Two) Times a Day for 10 days.  Dispense: 20 capsule; Refill: 0  -     Lyme, Total " Antibody Test / Reflex; Future       There are no Patient Instructions on file for this visit.  Plan of care reviewed with patient at the conclusion of today's visit. Education was provided regarding diagnosis, management and any prescribed or recommended OTC medications.  Patient verbalizes understanding of and agreement with management plan.    No follow-ups on file.    *Note that portions of this note were completed with a voice recognition program.  Efforts were made to edit the dictation but occasionally words are transcribed.    Lakshmi Page, APRN

## 2021-10-06 ENCOUNTER — TELEPHONE (OUTPATIENT)
Dept: INTERNAL MEDICINE | Facility: CLINIC | Age: 76
End: 2021-10-06

## 2021-10-06 LAB — B BURGDOR IGG+IGM SER-ACNC: <0.91 ISR (ref 0–0.9)

## 2021-10-06 NOTE — TELEPHONE ENCOUNTER
----- Message from DEEPA Beavers sent at 10/6/2021  4:36 PM EDT -----  Let her know lyme titer is neg

## 2022-02-12 DIAGNOSIS — I10 BENIGN ESSENTIAL HYPERTENSION: ICD-10-CM

## 2022-02-12 DIAGNOSIS — E78.00 HYPERCHOLESTEROLEMIA: ICD-10-CM

## 2022-02-14 RX ORDER — PRAVASTATIN SODIUM 20 MG
TABLET ORAL
Qty: 90 TABLET | Refills: 0 | Status: SHIPPED | OUTPATIENT
Start: 2022-02-14 | End: 2022-02-21 | Stop reason: SDUPTHER

## 2022-02-14 RX ORDER — LISINOPRIL 20 MG/1
TABLET ORAL
Qty: 90 TABLET | Refills: 0 | Status: SHIPPED | OUTPATIENT
Start: 2022-02-14 | End: 2022-05-24

## 2022-02-21 ENCOUNTER — LAB (OUTPATIENT)
Dept: LAB | Facility: HOSPITAL | Age: 77
End: 2022-02-21

## 2022-02-21 ENCOUNTER — OFFICE VISIT (OUTPATIENT)
Dept: INTERNAL MEDICINE | Facility: CLINIC | Age: 77
End: 2022-02-21

## 2022-02-21 VITALS
BODY MASS INDEX: 27.71 KG/M2 | OXYGEN SATURATION: 97 % | WEIGHT: 150.6 LBS | HEIGHT: 62 IN | HEART RATE: 62 BPM | DIASTOLIC BLOOD PRESSURE: 72 MMHG | TEMPERATURE: 97.3 F | SYSTOLIC BLOOD PRESSURE: 138 MMHG

## 2022-02-21 DIAGNOSIS — E78.00 HYPERCHOLESTEROLEMIA: ICD-10-CM

## 2022-02-21 DIAGNOSIS — I10 BENIGN ESSENTIAL HYPERTENSION: Primary | ICD-10-CM

## 2022-02-21 DIAGNOSIS — L65.9 HAIR LOSS: Chronic | ICD-10-CM

## 2022-02-21 DIAGNOSIS — E66.3 OVERWEIGHT WITH BODY MASS INDEX (BMI) OF 29 TO 29.9 IN ADULT: ICD-10-CM

## 2022-02-21 DIAGNOSIS — L98.9 SKIN LESION OF BACK: ICD-10-CM

## 2022-02-21 DIAGNOSIS — M85.89 OSTEOPENIA OF MULTIPLE SITES: ICD-10-CM

## 2022-02-21 LAB
ALBUMIN SERPL-MCNC: 4.5 G/DL (ref 3.5–5.2)
ALBUMIN/GLOB SERPL: 1.6 G/DL
ALP SERPL-CCNC: 73 U/L (ref 39–117)
ALT SERPL W P-5'-P-CCNC: 12 U/L (ref 1–33)
ANION GAP SERPL CALCULATED.3IONS-SCNC: 10.2 MMOL/L (ref 5–15)
AST SERPL-CCNC: 15 U/L (ref 1–32)
BILIRUB SERPL-MCNC: 0.7 MG/DL (ref 0–1.2)
BUN SERPL-MCNC: 13 MG/DL (ref 8–23)
BUN/CREAT SERPL: 15.7 (ref 7–25)
CALCIUM SPEC-SCNC: 9.8 MG/DL (ref 8.6–10.5)
CHLORIDE SERPL-SCNC: 103 MMOL/L (ref 98–107)
CHOLEST SERPL-MCNC: 158 MG/DL (ref 0–200)
CO2 SERPL-SCNC: 27.8 MMOL/L (ref 22–29)
CREAT SERPL-MCNC: 0.83 MG/DL (ref 0.57–1)
GFR SERPL CREATININE-BSD FRML MDRD: 67 ML/MIN/1.73
GLOBULIN UR ELPH-MCNC: 2.9 GM/DL
GLUCOSE SERPL-MCNC: 102 MG/DL (ref 65–99)
HDLC SERPL-MCNC: 56 MG/DL (ref 40–60)
LDLC SERPL CALC-MCNC: 85 MG/DL (ref 0–100)
LDLC/HDLC SERPL: 1.5 {RATIO}
POTASSIUM SERPL-SCNC: 4.3 MMOL/L (ref 3.5–5.2)
PROT SERPL-MCNC: 7.4 G/DL (ref 6–8.5)
SODIUM SERPL-SCNC: 141 MMOL/L (ref 136–145)
TRIGL SERPL-MCNC: 91 MG/DL (ref 0–150)
VLDLC SERPL-MCNC: 17 MG/DL (ref 5–40)

## 2022-02-21 PROCEDURE — 80053 COMPREHEN METABOLIC PANEL: CPT

## 2022-02-21 PROCEDURE — 80061 LIPID PANEL: CPT

## 2022-02-21 PROCEDURE — 99214 OFFICE O/P EST MOD 30 MIN: CPT | Performed by: INTERNAL MEDICINE

## 2022-02-21 RX ORDER — PRAVASTATIN SODIUM 20 MG
20 TABLET ORAL EVERY EVENING
Qty: 90 TABLET | Refills: 1 | Status: SHIPPED | OUTPATIENT
Start: 2022-02-21 | End: 2022-05-10

## 2022-02-21 NOTE — PROGRESS NOTES
[facility]     Payal Nix  1945   [id number]      Patient Care Team:  Sindy Kruger MD as PCP - General  Sindy Kruger MD as PCP - Family Medicine    Chief Complaint   Patient presents with   • Hypertension     f/u, spironolactone is making her hair fall out and making her BP dropp, she feels very lethargic, she wants to change it   • Hyperlipidemia            HPI  Patient is a 77 y.o. female who presents with alopecia for a 6-month followup visit.     Alopecia  The patient developed alopecia after starting spironolactone in 11/2021. She notes this changed the texture and thickness of her hair as well. She did not visualize large amounts of hair on the floor. The patient denies any other changes prior to the onset of alopecia. She feels her hair has returned to its usual texture and thickness since she discontinued spironolactone 1 month ago. The patient denies visualizing any flaking of her scalp though she has occasional pruritus. She is using a salon type of shampoo.       CHRONIC CONDITIONS    Hypertension and fatigue  The patient also noted her diastolic blood pressure was typically in the 50s, which caused significant fatigue while taking spironolactone. Systolic blood pressures were 115 to 130 mmHg. Since discontinuing spironolactone, her highest systolic blood pressure reading was 143 mmHg, but it has varied from 125 to 143 mmHg. Her diastolic readings have remained in the 50s. The patient's heart rate is typically in the 60s. Hydrochlorothiazide was discontinued in 11/2021 at the time we prescribed spironolactone. She denies any recent lower extremity edema. The patient is limiting her sodium intake.     Hyperlipidemia  The patient is taking pravastatin.     Diet and exercise  She ascends and descends her stairs multiple times a day for exercise. She also walks and rides her bicycle and occasionally uses hand weights. The patient believes she has lost some weight since her  last visit secondary to decreased appetite. She is consuming breakfast and light meals for lunch and dinner. She is not consuming much protein as she is close to being a vegetarian. The patient does consume eggs, peanut butter, and beans.     Skin lesions  The patient has developed a new lesion on her back. She has undergone her yearly skin exam. Her dermatologist is leaving the practice and she needs to find a new provider.     Preventive care  The patient has received her COVID-19 vaccine series, including the booster injection. She is current with her pneumonia and tetanus vaccines. She is due for the new shingles vaccine and notes her pharmacist recommended she delay this for a period of time following her tetanus vaccine injection.       Past Medical History:   Diagnosis Date   • Arthritis    • Cervical cancer (HCC)     Dr. Orr   • Cervical cancer (HCC)     in situ; Hysterectomy 1982   • Chronic right-sided thoracic back pain 1/24/2019   • Fatigue 1/24/2019   • Fibrocystic breast    • Hyperlipidemia    • Menopausal state        Past Surgical History:   Procedure Laterality Date   • BREAST CYST ASPIRATION     • CATARACT EXTRACTION, BILATERAL Bilateral     2021   • D & C WITH CERVICAL CONIZATION  1981   • HYSTERECTOMY  1982    Ovaries intact. cervical cancer in situ       Family History   Problem Relation Age of Onset   • Stroke Mother    • Tuberculosis Maternal Aunt    • Tuberculosis Maternal Grandmother    • Breast cancer Neg Hx    • Endometrial cancer Neg Hx    • Ovarian cancer Neg Hx        Social History     Socioeconomic History   • Marital status:    Tobacco Use   • Smoking status: Never Smoker   • Smokeless tobacco: Never Used   Substance and Sexual Activity   • Alcohol use: Yes     Comment: once in a while   • Drug use: No   • Sexual activity: Defer       Allergies   Allergen Reactions   • Amlodipine Besylate Other (See Comments)     edema  Other reaction(s): edema   • Benazepril Hcl  "Swelling   • Ciprofloxacin Other (See Comments)     Nausea and chills   • Ciprofloxacin Hcl Other (See Comments)     Nausea and chills   • Levofloxacin Nausea And Vomiting     Other reaction(s): nausea, vomiting   • Lotrel [Amlodipine Besy-Benazepril Hcl] Swelling     unknown       Review of Systems:     Review of Systems   Constitutional: Positive for appetite change and fatigue.   HENT: Negative.    Eyes: Negative.    Respiratory: Negative.    Cardiovascular: Negative.  Negative for leg swelling.   Gastrointestinal: Negative.    Endocrine: Negative.    Genitourinary: Negative.    Musculoskeletal: Negative.    Skin: Positive for skin lesions.   Allergic/Immunologic: Negative.    Neurological: Negative.    Hematological: Negative.    Psychiatric/Behavioral: Negative.        Vital Signs  Vitals:    02/21/22 0924   BP: 138/72   BP Location: Left arm   Patient Position: Sitting   Cuff Size: Adult   Pulse: 62   Temp: 97.3 °F (36.3 °C)   TempSrc: Infrared   SpO2: 97%   Weight: 68.3 kg (150 lb 9.6 oz)   Height: 157.5 cm (62.01\")   PainSc: 0-No pain     Body mass index is 27.54 kg/m².  Patient's Body mass index is 27.54 kg/m². indicating that she is overweight (BMI 25-29.9). Patient's (Body mass index is 27.54 kg/m².) indicates that they are overweight with health conditions that include hypertension and dyslipidemias . Weight is improving with lifestyle modifications. BMI is is above average; BMI management plan is completed. We discussed low calorie, low carb based diet program, portion control and increasing exercise.        Current Outpatient Medications:   •  aspirin 81 MG tablet, Take 1 tablet by mouth Daily., Disp: , Rfl:   •  B Complex Vitamins (VITAMIN B COMPLEX PO), 1 po qd, Disp: , Rfl:   •  Calcium Citrate-Vitamin D 500-500 MG-UNIT chewable tablet, 1 po 2 times a day, Disp: , Rfl:   •  Coenzyme Q10 (CO Q-10) 400 MG capsule, 1 capsule every evening, Disp: , Rfl:   •  doxazosin (CARDURA) 4 MG tablet, TAKE 1 " TABLET BY MOUTH ONCE DAILY AT NIGHT, Disp: 90 tablet, Rfl: 1  •  lisinopril (PRINIVIL,ZESTRIL) 20 MG tablet, TAKE 1 TABLET BY MOUTH ONCE DAILY AT NIGHT, Disp: 90 tablet, Rfl: 0  •  magnesium oxide (MAG-OX) 400 MG tablet, Take 1 tablet by mouth Daily., Disp: , Rfl:   •  metoprolol succinate XL (TOPROL-XL) 100 MG 24 hr tablet, TAKE 1/2 (ONE-HALF) TABLET BY MOUTH IN THE MORNING AND 1 IN THE EVENING, Disp: 135 tablet, Rfl: 1  •  pravastatin (PRAVACHOL) 20 MG tablet, Take 1 tablet by mouth Every Evening., Disp: 90 tablet, Rfl: 1  •  Proctofoam HC 1-1 % rectal foam, APPLY  CREAM RECTALLY AS NEEDED, Disp: 10 g, Rfl: 0  •  vitamin C (ASCORBIC ACID) 500 MG tablet, Take 500 mg by mouth Daily., Disp: , Rfl:   •  spironolactone (Aldactone) 100 MG tablet, Take 1 tablet by mouth Daily., Disp: 90 tablet, Rfl: 1    Physical Exam:    Physical Exam  Vitals and nursing note reviewed.   Constitutional:       Appearance: She is well-developed.   HENT:      Head: Normocephalic.      Right Ear: Tympanic membrane, ear canal and external ear normal.      Left Ear: Tympanic membrane and ear canal normal.   Eyes:      Conjunctiva/sclera: Conjunctivae normal.      Pupils: Pupils are equal, round, and reactive to light.   Neck:      Thyroid: No thyromegaly.   Cardiovascular:      Rate and Rhythm: Normal rate and regular rhythm.      Heart sounds: Normal heart sounds.   Pulmonary:      Effort: Pulmonary effort is normal.      Breath sounds: Normal breath sounds.   Abdominal:      General: Bowel sounds are normal.      Palpations: Abdomen is soft.      Tenderness: There is no abdominal tenderness.   Musculoskeletal:      Cervical back: Normal.      Comments: General: No tenderness. Normal ROM.    Lymphadenopathy:      Cervical: No cervical adenopathy.   Skin:     Findings: Lesion present. No erythema or rash.      Comments: Scalp is normal in appearance. There is a slightly erythematous, raised, rough, and flaky 1/2 cm lesion on her back which is  consistent with a an actinic keratosis.    Neurological:      Mental Status: She is alert and oriented to person, place, and time.      Cranial Nerves: Cranial nerves are intact.      Sensory: No sensory deficit.      Coordination: Coordination is intact.      Gait: Gait is intact.   Psychiatric:         Attention and Perception: Attention normal.         Mood and Affect: Mood normal.         Speech: Speech normal.         Behavior: Behavior normal.         Thought Content: Thought content normal.         Cognition and Memory: Cognition normal.         Judgment: Judgment normal.          ACE III MINI        Results Review:    None    CMP:  Lab Results   Component Value Date    BUN 13 02/21/2022    CREATININE 0.83 02/21/2022    EGFRIFNONA 67 02/21/2022    BCR 15.7 02/21/2022     02/21/2022    K 4.3 02/21/2022    CO2 27.8 02/21/2022    CALCIUM 9.8 02/21/2022    ALBUMIN 4.50 02/21/2022    BILITOT 0.7 02/21/2022    ALKPHOS 73 02/21/2022    AST 15 02/21/2022    ALT 12 02/21/2022     HbA1c:  No results found for: HGBA1C  Microalbumin:  Lab Results   Component Value Date    MICROALBUR <1.2 08/09/2021     Lipid Panel  Lab Results   Component Value Date    CHOL 158 02/21/2022    TRIG 91 02/21/2022    HDL 56 02/21/2022    LDL 85 02/21/2022    AST 15 02/21/2022    ALT 12 02/21/2022     Aldosterone to renin activity obtained in 08/2021 shows elevated aldosterone.     Bone density testing obtained in 01/2021 showed slightly decreased scores in the spine and increased in the hip.     Medication Review: Medications reviewed and noted  Patient Instructions   Problem List Items Addressed This Visit        Cardiac and Vasculature    Hypercholesterolemia    Overview     8/9/2021 Sindy Kruger MD     Continue pravastatin every evening.Continue low-fat diet and regular exercise.               Relevant Medications    pravastatin (PRAVACHOL) 20 MG tablet    Other Relevant Orders    Lipid Panel (Completed)    Comprehensive  Metabolic Panel (Completed)    Benign essential hypertension - Primary    Overview     Taking Metoprolol XL  1/2 tablet (50mg) every morning and continue 1 whole tablet (100mg) every evening, doxazosin 4 mg every evening, and lisinopril tablet 20 mg every evening.            Relevant Medications    spironolactone (Aldactone) 100 MG tablet    doxazosin (CARDURA) 4 MG tablet    metoprolol succinate XL (TOPROL-XL) 100 MG 24 hr tablet    lisinopril (PRINIVIL,ZESTRIL) 20 MG tablet       Musculoskeletal and Injuries    Osteopenia of multiple sites    Overview     Last DEXA 1/2021 showed some mild improvement in osteopenia T-scores of the hip.  The lowest T score is -1.8 in the femoral neck.  Spine T-scores decreased slightly but is still very mild osteopenia at -1.1.             Current Assessment & Plan     To keep your bones strong and healthy, would encourage weightbearing exercise 30 minutes 3 times a week at minimum.  Incorporate walking into daily activities, 30 minutes a day, 150 minutes a week, spread out over 5 days a week.     Maintain a normal body weight.    Eat a diet rich in calcium and vitamin D. Good sources of calcium are low-fat dairy products, dark green leafy vegetables, canned salmon or sardines, tofu, and calcium-fortified orange juice and cereals.    Avoid high protein diets. Protein is important, but too much animal protein can cause bone loss.    Limit caffeine, but moderate amounts of coffee and tea are fine. Avoid carbonated cola drinks as studies show drinking these puts you at greater risk for bone loss.    Recommend bone density study every 2 years.    Take calcium supplements and Vitamin D3.            Skin    Hair loss (Chronic)    Skin lesion of back       Other    Overweight with body mass index (BMI) of 29 to 29.9 in adult             Diagnosis Plan   1. Benign essential hypertension     2. Hypercholesterolemia  pravastatin (PRAVACHOL) 20 MG tablet    Lipid Panel    Comprehensive  Metabolic Panel   3. Osteopenia of multiple sites     4. Hair loss     5. Skin lesion of back     6. Overweight with body mass index (BMI) of 29 to 29.9 in adult       Alopecia  We discussed spironolactone is usually used to treat hair loss rather than being the etiology of this condition. Her hair loss may have been a coincidence related to factors such as increased stress. She is encouraged to inquire with her  regarding shampoos formulated for hair loss and thinning, such as Monat. The patient is encouraged to try biotin.     Hypertension and fatigue  The rationale for spironolactone was discussed, and the patient has agreed to another trial of this medication. She will continue lisinopril and doxazosin in the evening and metoprolol half a tablet in the morning and a whole tablet in the evening. She will also restart spironolactone 100 mg each morning. The patient will log her home blood pressure readings and will distinguish between morning and evening results. She will return in 3 to 4 weeks for reassessment.     Hyperlipidemia  Refills for pravastatin will be ordered at [facility] in [place].     Diet and exercise  She is encouraged to increase her walking and cycling exercise to 3 to 4 times weekly as well as increasing her use of hand weights. Her weight has decreased 8 pounds since her last visit in 08/2021. The patient is also encouraged to increase her protein intake.     Skin lesions  The skin lesion on her back does appear to be an actinic keratosis.  The patient will choose a new dermatology provider to evaluate the lesion on her back since Dr. Ruth De Guzman retired this past summer.     Preventive care  The patient is advised to obtain the new shingles vaccine 1 month following the date of her tetanus booster injection. She is due for a physical in 08/2022. She is current on her mammogram and bone density. The patient will be due for a colonoscopy in 2023. Bone density testing was performed  in 01/2021 and should be repeated in 01/2023.       Plan of care reviewed with patient at the conclusion of today's visit. Education was provided regarding diagnosis, management, and any prescribed or recommended OTC medications.Patient verbalizes understanding of and agreement with management plan.         Sindy Kruger MD     Transcribed from ambient dictation for Sindy Kruger MD by Sharla Guevara/Sally Dawn.  02/21/22   15:22 EST    Patient verbalized consent to the visit recording.

## 2022-02-22 NOTE — PATIENT INSTRUCTIONS
Patient Instructions   Problem List Items Addressed This Visit        Cardiac and Vasculature    Hypercholesterolemia    Overview     8/9/2021 Sindy Kruger MD     Continue pravastatin every evening.Continue low-fat diet and regular exercise.               Relevant Medications    pravastatin (PRAVACHOL) 20 MG tablet    Other Relevant Orders    Lipid Panel (Completed)    Comprehensive Metabolic Panel (Completed)    Benign essential hypertension - Primary    Overview     Taking Metoprolol XL  1/2 tablet (50mg) every morning and continue 1 whole tablet (100mg) every evening, doxazosin 4 mg every evening, and lisinopril tablet 20 mg every evening.            Relevant Medications    spironolactone (Aldactone) 100 MG tablet    doxazosin (CARDURA) 4 MG tablet    metoprolol succinate XL (TOPROL-XL) 100 MG 24 hr tablet    lisinopril (PRINIVIL,ZESTRIL) 20 MG tablet       Musculoskeletal and Injuries    Osteopenia of multiple sites    Overview     Last DEXA 1/2021 showed some mild improvement in osteopenia T-scores of the hip.  The lowest T score is -1.8 in the femoral neck.  Spine T-scores decreased slightly but is still very mild osteopenia at -1.1.             Current Assessment & Plan     To keep your bones strong and healthy, would encourage weightbearing exercise 30 minutes 3 times a week at minimum.  Incorporate walking into daily activities, 30 minutes a day, 150 minutes a week, spread out over 5 days a week.     Maintain a normal body weight.    Eat a diet rich in calcium and vitamin D. Good sources of calcium are low-fat dairy products, dark green leafy vegetables, canned salmon or sardines, tofu, and calcium-fortified orange juice and cereals.    Avoid high protein diets. Protein is important, but too much animal protein can cause bone loss.    Limit caffeine, but moderate amounts of coffee and tea are fine. Avoid carbonated cola drinks as studies show drinking these puts you at greater risk for bone  "loss.    Recommend bone density study every 2 years.    Take calcium supplements and Vitamin D3.            Skin    Hair loss (Chronic)    Skin lesion of back       Other    Overweight with body mass index (BMI) of 29 to 29.9 in adult             BMI for Adults  What is BMI?  Body mass index (BMI) is a number that is calculated from a person's weight and height. BMI can help estimate how much of a person's weight is composed of fat. BMI does not measure body fat directly. Rather, it is an alternative to procedures that directly measure body fat, which can be difficult and expensive.  BMI can help identify people who may be at higher risk for certain medical problems.  What are BMI measurements used for?  BMI is used as a screening tool to identify possible weight problems. It helps determine whether a person is obese, overweight, a healthy weight, or underweight.  BMI is useful for:  · Identifying a weight problem that may be related to a medical condition or may increase the risk for medical problems.  · Promoting changes, such as changes in diet and exercise, to help reach a healthy weight. BMI screening can be repeated to see if these changes are working.  How is BMI calculated?  BMI involves measuring your weight in relation to your height. Both height and weight are measured, and the BMI is calculated from those numbers. This can be done either in English ([place] or metric measurements. Note that charts and online BMI calculators are available to help you find your BMI quickly and easily without having to do these calculations yourself.  To calculate your BMI in English ([place] measurements:    1. Measure your weight in pounds (lb).  2. Multiply the number of pounds by 703.  ? For example, for a person who weighs 180 lb, multiply that number by 703, which equals 126,540.  3. Measure your height in inches. Then multiply that number by itself to get a measurement called \"inches squared.\"  ? For example, for a " "person who is 70 inches tall, the \"inches squared\" measurement is 70 inches x 70 inches, which equals 4,900 inches squared.  4. Divide the total from step 2 (number of lb x 703) by the total from step 3 (inches squared): 126,540 ÷ 4,900 = 25.8. This is your BMI.    To calculate your BMI in metric measurements:  1. Measure your weight in kilograms (kg).  2. Measure your height in meters (m). Then multiply that number by itself to get a measurement called \"meters squared.\"  ? For example, for a person who is 1.75 m tall, the \"meters squared\" measurement is 1.75 m x 1.75 m, which is equal to 3.1 meters squared.  3. Divide the number of kilograms (your weight) by the meters squared number. In this example: 70 ÷ 3.1 = 22.6. This is your BMI.  What do the results mean?  BMI charts are used to identify whether you are underweight, normal weight, overweight, or obese. The following guidelines will be used:  · Underweight: BMI less than 18.5.  · Normal weight: BMI between 18.5 and 24.9.  · Overweight: BMI between 25 and 29.9.  · Obese: BMI of 30 or above.  Keep these notes in mind:  · Weight includes both fat and muscle, so someone with a muscular build, such as an athlete, may have a BMI that is higher than 24.9. In cases like these, BMI is not an accurate measure of body fat.  · To determine if excess body fat is the cause of a BMI of 25 or higher, further assessments may need to be done by a health care provider.  · BMI is usually interpreted in the same way for men and women.  Where to find more information  For more information about BMI, including tools to quickly calculate your BMI, go to these websites:  · Centers for Disease Control and Prevention: www.cdc.gov  · American Heart Association: www.heart.org  · National Heart, Lung, and Blood [place]: www.nhlbi.nih.gov  Summary  · Body mass index (BMI) is a number that is calculated from a person's weight and height.  · BMI may help estimate how much of a person's " weight is composed of fat. BMI can help identify those who may be at higher risk for certain medical problems.  · BMI can be measured using English measurements or metric measurements.  · BMI charts are used to identify whether you are underweight, normal weight, overweight, or obese.  This information is not intended to replace advice given to you by your health care provider. Make sure you discuss any questions you have with your health care provider.  Document Revised: 09/09/2020 Document Reviewed: 07/17/2020  [employer] Patient Education © 2021 [employer]      Calorie Counting for Weight Loss  Calories are units of energy. Your body needs a certain number of calories from food to keep going throughout the day. When you eat or drink more calories than your body needs, your body stores the extra calories mostly as fat. When you eat or drink fewer calories than your body needs, your body burns fat to get the energy it needs.  Calorie counting means keeping track of how many calories you eat and drink each day. Calorie counting can be helpful if you need to lose weight. If you eat fewer calories than your body needs, you should lose weight. Ask your health care provider what a healthy weight is for you.  For calorie counting to work, you will need to eat the right number of calories each day to lose a healthy amount of weight per week. A dietitian can help you figure out how many calories you need in a day and will suggest ways to reach your calorie goal.  · A healthy amount of weight to lose each week is usually 1-2 lb (0.5-0.9 kg). This usually means that your daily calorie intake should be reduced by 500-750 calories.  · Eating 1,200-1,500 calories a day can help most women lose weight.  · Eating 1,500-1,800 calories a day can help most men lose weight.  What do I need to know about calorie counting?  Work with your health care provider or dietitian to determine how many calories you should get each day. To meet  your daily calorie goal, you will need to:  · Find out how many calories are in each food that you would like to eat. Try to do this before you eat.  · Decide how much of the food you plan to eat.  · Keep a food log. Do this by writing down what you ate and how many calories it had.  To successfully lose weight, it is important to balance calorie counting with a healthy lifestyle that includes regular activity.  Where do I find calorie information?    The number of calories in a food can be found on a Nutrition Facts label. If a food does not have a Nutrition Facts label, try to look up the calories online or ask your dietitian for help.  Remember that calories are listed per serving. If you choose to have more than one serving of a food, you will have to multiply the calories per serving by the number of servings you plan to eat. For example, the label on a package of bread might say that a serving size is 1 slice and that there are 90 calories in a serving. If you eat 1 slice, you will have eaten 90 calories. If you eat 2 slices, you will have eaten 180 calories.  How do I keep a food log?  After each time that you eat, record the following in your food log as soon as possible:  · What you ate. Be sure to include toppings, sauces, and other extras on the food.  · How much you ate. This can be measured in cups, ounces, or number of items.  · How many calories were in each food and drink.  · The total number of calories in the food you ate.  Keep your food log near you, such as in a pocket-sized notebook or on an ruel or website on your mobile phone. Some programs will calculate calories for you and show you how many calories you have left to meet your daily goal.  What are some portion-control tips?  · Know how many calories are in a serving. This will help you know how many servings you can have of a certain food.  · Use a measuring cup to measure serving sizes. You could also try weighing out portions on a kitchen  scale. With time, you will be able to estimate serving sizes for some foods.  · Take time to put servings of different foods on your favorite plates or in your favorite bowls and cups so you know what a serving looks like.  · Try not to eat straight from a food's packaging, such as from a bag or box. Eating straight from the package makes it hard to see how much you are eating and can lead to overeating. Put the amount you would like to eat in a cup or on a plate to make sure you are eating the right portion.  · Use smaller plates, glasses, and bowls for smaller portions and to prevent overeating.  · Try not to multitask. For example, avoid watching TV or using your computer while eating. If it is time to eat, sit down at a table and enjoy your food. This will help you recognize when you are full. It will also help you be more mindful of what and how much you are eating.  What are tips for following this plan?  Reading food labels  · Check the calorie count compared with the serving size. The serving size may be smaller than what you are used to eating.  · Check the source of the calories. Try to choose foods that are high in protein, fiber, and vitamins, and low in saturated fat, trans fat, and sodium.  Shopping  · Read nutrition labels while you shop. This will help you make healthy decisions about which foods to buy.  · Pay attention to nutrition labels for low-fat or fat-free foods. These foods sometimes have the same number of calories or more calories than the full-fat versions. They also often have added sugar, starch, or salt to make up for flavor that was removed with the fat.  · Make a grocery list of lower-calorie foods and stick to it.  Cooking  · Try to cook your favorite foods in a healthier way. For example, try baking instead of frying.  · Use low-fat dairy products.  Meal planning  · Use more fruits and vegetables. One-half of your plate should be fruits and vegetables.  · Include lean proteins,  such as chicken, turkey, and fish.  Lifestyle  Each week, aim to do one of the following:  · 150 minutes of moderate exercise, such as walking.  · 75 minutes of vigorous exercise, such as running.  General information  · Know how many calories are in the foods you eat most often. This will help you calculate calorie counts faster.  · Find a way of tracking calories that works for you. Get creative. Try different apps or programs if writing down calories does not work for you.  What foods should I eat?    · Eat nutritious foods. It is better to have a nutritious, high-calorie food, such as an avocado, than a food with few nutrients, such as a bag of potato chips.  · Use your calories on foods and drinks that will fill you up and will not leave you hungry soon after eating.  ? Examples of foods that fill you up are nuts and nut butters, vegetables, lean proteins, and high-fiber foods such as whole grains. High-fiber foods are foods with more than 5 g of fiber per serving.  · Pay attention to calories in drinks. Low-calorie drinks include water and unsweetened drinks.  The items listed above may not be a complete list of foods and beverages you can eat. Contact a dietitian for more information.  What foods should I limit?  Limit foods or drinks that are not good sources of vitamins, minerals, or protein or that are high in unhealthy fats. These include:  · Candy.  · Other sweets.  · Sodas, specialty coffee drinks, alcohol, and juice.  The items listed above may not be a complete list of foods and beverages you should avoid. Contact a dietitian for more information.  How do I count calories when eating out?  · Pay attention to portions. Often, portions are much larger when eating out. Try these tips to keep portions smaller:  ? Consider sharing a meal instead of getting your own.  ? If you get your own meal, eat only half of it. Before you start eating, ask for a container and put half of your meal into it.  ? When  available, consider ordering smaller portions from the menu instead of full portions.  · Pay attention to your food and drink choices. Knowing the way food is cooked and what is included with the meal can help you eat fewer calories.  ? If calories are listed on the menu, choose the lower-calorie options.  ? Choose dishes that include vegetables, fruits, whole grains, low-fat dairy products, and lean proteins.  ? Choose items that are boiled, broiled, grilled, or steamed. Avoid items that are buttered, battered, fried, or served with cream sauce. Items labeled as crispy are usually fried, unless stated otherwise.  ? Choose water, low-fat milk, unsweetened iced tea, or other drinks without added sugar. If you want an alcoholic beverage, choose a lower-calorie option, such as a glass of wine or light beer.  ? Ask for dressings, sauces, and syrups on the side. These are usually high in calories, so you should limit the amount you eat.  ? If you want a salad, choose a garden salad and ask for grilled meats. Avoid extra toppings such as ibarra, cheese, or fried items. Ask for the dressing on the side, or ask for olive oil and vinegar or lemon to use as dressing.  · Estimate how many servings of a food you are given. Knowing serving sizes will help you be aware of how much food you are eating at restaurants.  Where to find more information  · Centers for Disease Control and Prevention: www.cdc.gov  · [place] Department of Agriculture: myplate.gov  Summary  · Calorie counting means keeping track of how many calories you eat and drink each day. If you eat fewer calories than your body needs, you should lose weight.  · A healthy amount of weight to lose per week is usually 1-2 lb (0.5-0.9 kg). This usually means reducing your daily calorie intake by 500-750 calories.  · The number of calories in a food can be found on a Nutrition Facts label. If a food does not have a Nutrition Facts label, try to look up the calories online or  ask your dietitian for help.  · Use smaller plates, glasses, and bowls for smaller portions and to prevent overeating.  · Use your calories on foods and drinks that will fill you up and not leave you hungry shortly after a meal.  This information is not intended to replace advice given to you by your health care provider. Make sure you discuss any questions you have with your health care provider.  Document Revised: 01/28/2021 Document Reviewed: 01/28/2021  [employer] Patient Education © 2021 [employer]

## 2022-02-22 NOTE — ASSESSMENT & PLAN NOTE
To keep your bones strong and healthy, would encourage weightbearing exercise 30 minutes 3 times a week at minimum.  Incorporate walking into daily activities, 30 minutes a day, 150 minutes a week, spread out over 5 days a week.     Maintain a normal body weight.    Eat a diet rich in calcium and vitamin D. Good sources of calcium are low-fat dairy products, dark green leafy vegetables, canned salmon or sardines, tofu, and calcium-fortified orange juice and cereals.    Avoid high protein diets. Protein is important, but too much animal protein can cause bone loss.    Limit caffeine, but moderate amounts of coffee and tea are fine. Avoid carbonated cola drinks as studies show drinking these puts you at greater risk for bone loss.    Recommend bone density study every 2 years.    Take calcium supplements and Vitamin D3.

## 2022-03-04 RX ORDER — PRAMOXINE HYDROCHLORIDE HYDROCORTISONE ACETATE 100; 100 MG/10G; MG/10G
AEROSOL, FOAM TOPICAL
Qty: 10 G | Refills: 0 | Status: SHIPPED | OUTPATIENT
Start: 2022-03-04

## 2022-03-15 ENCOUNTER — OFFICE VISIT (OUTPATIENT)
Dept: INTERNAL MEDICINE | Facility: CLINIC | Age: 77
End: 2022-03-15

## 2022-03-15 VITALS
DIASTOLIC BLOOD PRESSURE: 66 MMHG | SYSTOLIC BLOOD PRESSURE: 142 MMHG | BODY MASS INDEX: 27.53 KG/M2 | TEMPERATURE: 98.6 F | HEART RATE: 60 BPM | HEIGHT: 62 IN | OXYGEN SATURATION: 99 % | WEIGHT: 149.6 LBS

## 2022-03-15 DIAGNOSIS — J34.89 SINUS DRAINAGE: Chronic | ICD-10-CM

## 2022-03-15 DIAGNOSIS — I10 BENIGN ESSENTIAL HYPERTENSION: Primary | ICD-10-CM

## 2022-03-15 DIAGNOSIS — R53.83 OTHER FATIGUE: ICD-10-CM

## 2022-03-15 PROCEDURE — 99214 OFFICE O/P EST MOD 30 MIN: CPT | Performed by: INTERNAL MEDICINE

## 2022-03-15 NOTE — ASSESSMENT & PLAN NOTE
Continue metoprolol, doxazosin, lisinopril, and spironolactone. Continue to check blood pressure 1 to 2 times weekly and keep a log.

## 2022-03-15 NOTE — ASSESSMENT & PLAN NOTE
Advised Mucinex over-the-counter twice daily for 4 to 5 days as well as Flonase twice daily for 1 to 2 weeks.

## 2022-03-15 NOTE — PROGRESS NOTES
[facility]     Payal Nix  1945   [id number]      Patient Care Team:  Sindy Kruger MD as PCP - General  Sindy Kruger MD as PCP - Family Medicine    Chief Complaint   Patient presents with   • Hypertension     Follow up            HPI  Patient is a 77 y.o. female presents with hypertension.     Skin lesion  She has not seen dermatology yet but will do so.     Sinus drainage  She complains of clear, thick sinus drainage, making it difficult to swallow, as well as some ear fullness and neck soreness. The patient also reports associated headache. She denies any general malaise, fever, body aches, shortness of breath, chest congestion, ear pain, sore throat, or facial pressure. She has taken Tylenol but not Mucinex or Flonase. The patient did a home COVID test yesterday which was negative.     CHRONIC CONDITIONS    Hypertension  She takes metoprolol 0.5 tablet in the morning and 1 tablet at night, doxazosin 4 mg daily, spironolactone 100 mg in the morning, and lisinopril 20 mg daily. The patient denies any side effects from the medications. She brought her blood pressure logs in and on review, the most recent ones look good. Her readings during 03/2022 typically range from 107 to 134 over 56 to 68, although she had a reading of 143/66 yesterday evening, and 156/73 in the evening of 03/03/2022. Prior to 03/2022, she did have more readings in the 140s to 150s, with even one reading in the 180s.  She was not taking the spironolactone at that time but resumed it on that date.  Her heart rate averages around 65 bpm.     Fatigue  She continues to complain of fatigue, unchanged over the last few years. The patient reports her sleep is good but she finds herself needing to nap sometimes midday. She does walk regularly.     Hair loss  This is also unchanged. She reports her  has not noticed a significant difference and she will see her again next week. The patient denies any scalp  "irritation.     Hemorrhoids  She reports her insurance will not pay for the Proctofoam but has not found out the cost yet.     Past Medical History:   Diagnosis Date   • Arthritis    • Cervical cancer (HCC)     Dr. Orr   • Cervical cancer (HCC)     in situ; Hysterectomy 1982   • Chronic right-sided thoracic back pain 1/24/2019   • Fatigue 1/24/2019   • Fibrocystic breast    • Hyperlipidemia    • Menopausal state        Past Surgical History:   Procedure Laterality Date   • BREAST CYST ASPIRATION     • CATARACT EXTRACTION, BILATERAL Bilateral     2021   • D & C WITH CERVICAL CONIZATION  1981   • HYSTERECTOMY  1982    Ovaries intact. cervical cancer in situ       Family History   Problem Relation Age of Onset   • Stroke Mother    • Tuberculosis Maternal Aunt    • Tuberculosis Maternal Grandmother    • Breast cancer Neg Hx    • Endometrial cancer Neg Hx    • Ovarian cancer Neg Hx        Social History     Socioeconomic History   • Marital status:    Tobacco Use   • Smoking status: Never Smoker   • Smokeless tobacco: Never Used   Substance and Sexual Activity   • Alcohol use: Yes     Comment: once in a while   • Drug use: No   • Sexual activity: Defer       Allergies   Allergen Reactions   • Amlodipine Besylate Other (See Comments)     edema  Other reaction(s): edema   • Benazepril Hcl Swelling   • Ciprofloxacin Other (See Comments)     Nausea and chills   • Ciprofloxacin Hcl Other (See Comments)     Nausea and chills   • Levofloxacin Nausea And Vomiting     Other reaction(s): nausea, vomiting   • Lotrel [Amlodipine Besy-Benazepril Hcl] Swelling     unknown         Vital Signs  Vitals:    03/15/22 0915   BP: 142/66   BP Location: Left arm   Patient Position: Sitting   Cuff Size: Adult   Pulse: 60   Temp: 98.6 °F (37 °C)   TempSrc: Infrared   SpO2: 99%   Weight: 67.9 kg (149 lb 9.6 oz)   Height: 157.5 cm (62.01\")   PainSc: 0-No pain     Body mass index is 27.35 kg/m².  Patient's Body mass index is 27.35 " kg/m². indicating that she is overweight (BMI 25-29.9). Patient's (Body mass index is 27.35 kg/m².) indicates that they are overweight with health conditions that include hypertension and dyslipidemias . Weight is unchanged. BMI is is above average; BMI management plan is completed. We discussed low calorie, low carb based diet program, portion control and increasing exercise. .        Current Outpatient Medications:   •  aspirin 81 MG tablet, Take 1 tablet by mouth Daily., Disp: , Rfl:   •  B Complex Vitamins (VITAMIN B COMPLEX PO), 1 po qd, Disp: , Rfl:   •  Calcium Citrate-Vitamin D 500-500 MG-UNIT chewable tablet, 1 po 2 times a day, Disp: , Rfl:   •  Coenzyme Q10 (CO Q-10) 400 MG capsule, 1 capsule every evening, Disp: , Rfl:   •  doxazosin (CARDURA) 4 MG tablet, TAKE 1 TABLET BY MOUTH ONCE DAILY AT NIGHT, Disp: 90 tablet, Rfl: 1  •  lisinopril (PRINIVIL,ZESTRIL) 20 MG tablet, TAKE 1 TABLET BY MOUTH ONCE DAILY AT NIGHT, Disp: 90 tablet, Rfl: 0  •  magnesium oxide (MAG-OX) 400 MG tablet, Take 1 tablet by mouth Daily., Disp: , Rfl:   •  metoprolol succinate XL (TOPROL-XL) 100 MG 24 hr tablet, TAKE 1/2 (ONE-HALF) TABLET BY MOUTH IN THE MORNING AND 1 IN THE EVENING, Disp: 135 tablet, Rfl: 1  •  pravastatin (PRAVACHOL) 20 MG tablet, Take 1 tablet by mouth Every Evening., Disp: 90 tablet, Rfl: 1  •  Proctofoam HC 1-1 % rectal foam, APPLY CREAM RECTALLY AS NEEDED, Disp: 10 g, Rfl: 0  •  spironolactone (Aldactone) 100 MG tablet, Take 1 tablet by mouth Daily., Disp: 90 tablet, Rfl: 1  •  vitamin C (ASCORBIC ACID) 500 MG tablet, Take 500 mg by mouth Daily., Disp: , Rfl:     Physical Exam:    Physical Exam  Vitals and nursing note reviewed.   Constitutional:       Appearance: She is well-developed.   HENT:      Head: Normocephalic.      Nose:      Left Turbinates: Swollen.      Mouth/Throat:      Pharynx: No posterior oropharyngeal erythema.   Eyes:      Conjunctiva/sclera: Conjunctivae normal.      Pupils: Pupils are  equal, round, and reactive to light.   Neck:      Thyroid: No thyromegaly.   Cardiovascular:      Rate and Rhythm: Normal rate and regular rhythm.      Heart sounds: Normal heart sounds.   Pulmonary:      Effort: Pulmonary effort is normal.      Breath sounds: Normal breath sounds. No wheezing.   Musculoskeletal:         General: Normal range of motion.      Cervical back: Normal range of motion and neck supple.   Lymphadenopathy:      Cervical: No cervical adenopathy.   Skin:     General: Skin is warm and dry.   Neurological:      Mental Status: She is alert and oriented to person, place, and time.   Psychiatric:         Thought Content: Thought content normal.          ACE III MINI        Results Review:    I reviewed the patient's new clinical results.    CMP:  Lab Results   Component Value Date    BUN 13 02/21/2022    CREATININE 0.83 02/21/2022    EGFRIFNONA 67 02/21/2022    BCR 15.7 02/21/2022     02/21/2022    K 4.3 02/21/2022    CO2 27.8 02/21/2022    CALCIUM 9.8 02/21/2022    ALBUMIN 4.50 02/21/2022    BILITOT 0.7 02/21/2022    ALKPHOS 73 02/21/2022    AST 15 02/21/2022    ALT 12 02/21/2022     HbA1c:  No results found for: HGBA1C  Microalbumin:  Lab Results   Component Value Date    MICROALBUR <1.2 08/09/2021     Lipid Panel  Lab Results   Component Value Date    CHOL 158 02/21/2022    TRIG 91 02/21/2022    HDL 56 02/21/2022    LDL 85 02/21/2022    AST 15 02/21/2022    ALT 12 02/21/2022       Medication Review: Medications reviewed and noted  Patient Instructions   Problem List Items Addressed This Visit        Cardiac and Vasculature    Benign essential hypertension - Primary    Overview     Taking Metoprolol XL  1/2 tablet (50mg) every morning and continue 1 whole tablet (100mg) every evening, doxazosin 4 mg every evening, and lisinopril tablet 20 mg every evening.              Current Assessment & Plan     Continue metoprolol, doxazosin, lisinopril, and spironolactone. Continue to check blood  pressure 1 to 2 times weekly and keep a log.              Relevant Medications    spironolactone (Aldactone) 100 MG tablet    doxazosin (CARDURA) 4 MG tablet    metoprolol succinate XL (TOPROL-XL) 100 MG 24 hr tablet    lisinopril (PRINIVIL,ZESTRIL) 20 MG tablet       ENT    Sinus drainage (Chronic)    Current Assessment & Plan     Advised Mucinex over-the-counter twice daily for 4 to 5 days as well as Flonase twice daily for 1 to 2 weeks.               Symptoms and Signs    Other fatigue    Current Assessment & Plan     Likely multifactorial. Could be due to age and medication.                    Diagnosis Plan   1. Benign essential hypertension     2. Sinus drainage     3. Other fatigue             Plan of care reviewed with patient at the conclusion of today's visit. Education was provided regarding diagnosis, management, and any prescribed or recommended OTC medications.Patient verbalizes understanding of and agreement with management plan.         Sindy Kruger MD    Transcribed from ambient dictation for Sindy Kruger MD by Yaneli Morley.  03/15/22   15:56 EDT    Patient verbalized consent to the visit recording.  I have personally performed the services described in this document as transcribed by the above individual, and it is both accurate and complete.  Sindy Kruger MD  3/16/2022  10:27 EDT

## 2022-03-16 NOTE — PATIENT INSTRUCTIONS
Patient Instructions   Problem List Items Addressed This Visit          Cardiac and Vasculature    Benign essential hypertension - Primary    Overview     Taking Metoprolol XL  1/2 tablet (50mg) every morning and continue 1 whole tablet (100mg) every evening, doxazosin 4 mg every evening, and lisinopril tablet 20 mg every evening.              Current Assessment & Plan     Continue metoprolol, doxazosin, lisinopril, and spironolactone. Continue to check blood pressure 1 to 2 times weekly and keep a log.              Relevant Medications    spironolactone (Aldactone) 100 MG tablet    doxazosin (CARDURA) 4 MG tablet    metoprolol succinate XL (TOPROL-XL) 100 MG 24 hr tablet    lisinopril (PRINIVIL,ZESTRIL) 20 MG tablet       ENT    Sinus drainage (Chronic)    Current Assessment & Plan     Advised Mucinex over-the-counter twice daily for 4 to 5 days as well as Flonase twice daily for 1 to 2 weeks.               Symptoms and Signs    Other fatigue    Current Assessment & Plan     Likely multifactorial. Could be due to age and medication.

## 2022-03-25 RX ORDER — DOXAZOSIN MESYLATE 4 MG/1
TABLET ORAL
Qty: 90 TABLET | Refills: 0 | Status: SHIPPED | OUTPATIENT
Start: 2022-03-25 | End: 2022-05-27

## 2022-03-25 NOTE — TELEPHONE ENCOUNTER
Rx Refill Note  Requested Prescriptions     Pending Prescriptions Disp Refills   • doxazosin (CARDURA) 4 MG tablet [Pharmacy Med Name: Doxazosin Mesylate 4 MG Oral Tablet] 90 tablet 0     Sig: TAKE 1 TABLET BY MOUTH ONCE DAILY AT NIGHT      Last office visit with prescribing clinician: 3/15/2022      Next office visit with prescribing clinician: 8/16/2022            Winnie Florian LPN  03/25/22, 08:08 EDT

## 2022-04-07 RX ORDER — METOPROLOL SUCCINATE 100 MG/1
TABLET, EXTENDED RELEASE ORAL
Qty: 135 TABLET | Refills: 1 | Status: SHIPPED | OUTPATIENT
Start: 2022-04-07 | End: 2022-11-10

## 2022-05-02 RX ORDER — SPIRONOLACTONE 100 MG/1
TABLET, FILM COATED ORAL
Qty: 90 TABLET | Refills: 0 | Status: SHIPPED | OUTPATIENT
Start: 2022-05-02 | End: 2022-05-27 | Stop reason: SDUPTHER

## 2022-05-09 DIAGNOSIS — E78.00 HYPERCHOLESTEROLEMIA: ICD-10-CM

## 2022-05-10 RX ORDER — PRAVASTATIN SODIUM 20 MG
TABLET ORAL
Qty: 90 TABLET | Refills: 0 | Status: SHIPPED | OUTPATIENT
Start: 2022-05-10 | End: 2022-08-15

## 2022-05-13 ENCOUNTER — TELEPHONE (OUTPATIENT)
Dept: INTERNAL MEDICINE | Facility: CLINIC | Age: 77
End: 2022-05-13

## 2022-05-13 NOTE — TELEPHONE ENCOUNTER
This person called the  and states that she has had low BP's. I talked with the patient and she states that for several weeks she has been feeling dizzy and so tired. Some of her BP's were 93/53  99/75  93/54. She states that you had changed her BP medications a few months ago.

## 2022-05-13 NOTE — TELEPHONE ENCOUNTER
Decrease lisinopril to one half of the 20 mg tablet daily.  Decrease spironolactone to one half of the 100 mg tablet daily.  Continue to  Continue metoprolol and doxazosin as is for now.    Make her an appointment to see me or APC in 2 weeks.  If blood pressures are not not starting to come up in next few days, she should let us know.

## 2022-05-16 ENCOUNTER — TELEPHONE (OUTPATIENT)
Dept: INTERNAL MEDICINE | Facility: CLINIC | Age: 77
End: 2022-05-16

## 2022-05-16 NOTE — TELEPHONE ENCOUNTER
Please confirm that she did decrease the lisinopril and the spironolactone as we advised on 5/13/2022.  If blood pressures are still around 110 or less on the systolic, then decrease doxazosin to 1/2 tablet every evening as well and let us know how it is going in a few day

## 2022-05-16 NOTE — TELEPHONE ENCOUNTER
LOW BLOOD PRESSURE READINGS    Saturday Morning 105/59                  Afternoon 99/52                  Evening 105/53    Sunday Morning 109/57                Afternoon 109/60                 Evening  99/56    Monday Morning 111/62                 Afternoon 99/51    PT WOULD LIKE A CALL BACK ABOUT THE MEDICATION SHE IS TAKING.  [phone]

## 2022-05-24 DIAGNOSIS — I10 BENIGN ESSENTIAL HYPERTENSION: ICD-10-CM

## 2022-05-24 RX ORDER — LISINOPRIL 20 MG/1
TABLET ORAL
Qty: 90 TABLET | Refills: 1 | Status: SHIPPED | OUTPATIENT
Start: 2022-05-24 | End: 2022-05-27 | Stop reason: SDUPTHER

## 2022-05-27 ENCOUNTER — OFFICE VISIT (OUTPATIENT)
Dept: INTERNAL MEDICINE | Facility: CLINIC | Age: 77
End: 2022-05-27

## 2022-05-27 ENCOUNTER — LAB (OUTPATIENT)
Dept: LAB | Facility: HOSPITAL | Age: 77
End: 2022-05-27

## 2022-05-27 VITALS
DIASTOLIC BLOOD PRESSURE: 60 MMHG | OXYGEN SATURATION: 96 % | BODY MASS INDEX: 27.6 KG/M2 | HEART RATE: 68 BPM | TEMPERATURE: 97.8 F | WEIGHT: 150 LBS | SYSTOLIC BLOOD PRESSURE: 116 MMHG | HEIGHT: 62 IN

## 2022-05-27 DIAGNOSIS — I10 BENIGN ESSENTIAL HYPERTENSION: ICD-10-CM

## 2022-05-27 DIAGNOSIS — H35.033 HYPERTENSIVE RETINOPATHY OF BOTH EYES: ICD-10-CM

## 2022-05-27 DIAGNOSIS — I10 BENIGN ESSENTIAL HYPERTENSION: Primary | ICD-10-CM

## 2022-05-27 DIAGNOSIS — M85.89 OSTEOPENIA OF MULTIPLE SITES: ICD-10-CM

## 2022-05-27 DIAGNOSIS — E78.00 HYPERCHOLESTEROLEMIA: ICD-10-CM

## 2022-05-27 DIAGNOSIS — E66.3 OVERWEIGHT WITH BODY MASS INDEX (BMI) OF 27 TO 27.9 IN ADULT: Chronic | ICD-10-CM

## 2022-05-27 LAB
ALBUMIN SERPL-MCNC: 4.8 G/DL (ref 3.5–5.2)
ALBUMIN UR-MCNC: <1.2 MG/DL
ALBUMIN/GLOB SERPL: 2.1 G/DL
ALP SERPL-CCNC: 67 U/L (ref 39–117)
ALT SERPL W P-5'-P-CCNC: 8 U/L (ref 1–33)
ANION GAP SERPL CALCULATED.3IONS-SCNC: 9.3 MMOL/L (ref 5–15)
AST SERPL-CCNC: 15 U/L (ref 1–32)
BACTERIA UR QL AUTO: ABNORMAL /HPF
BILIRUB SERPL-MCNC: 0.5 MG/DL (ref 0–1.2)
BILIRUB UR QL STRIP: NEGATIVE
BUN SERPL-MCNC: 17 MG/DL (ref 8–23)
BUN/CREAT SERPL: 18.1 (ref 7–25)
CALCIUM SPEC-SCNC: 10.1 MG/DL (ref 8.6–10.5)
CHLORIDE SERPL-SCNC: 103 MMOL/L (ref 98–107)
CHOLEST SERPL-MCNC: 140 MG/DL (ref 0–200)
CLARITY UR: CLEAR
CO2 SERPL-SCNC: 24.7 MMOL/L (ref 22–29)
COLOR UR: YELLOW
CREAT SERPL-MCNC: 0.94 MG/DL (ref 0.57–1)
CREAT UR-MCNC: 117.9 MG/DL
EGFRCR SERPLBLD CKD-EPI 2021: 63 ML/MIN/1.73
GLOBULIN UR ELPH-MCNC: 2.3 GM/DL
GLUCOSE SERPL-MCNC: 85 MG/DL (ref 65–99)
GLUCOSE UR STRIP-MCNC: NEGATIVE MG/DL
HDLC SERPL-MCNC: 55 MG/DL (ref 40–60)
HGB UR QL STRIP.AUTO: NEGATIVE
HYALINE CASTS UR QL AUTO: ABNORMAL /LPF
KETONES UR QL STRIP: NEGATIVE
LDLC SERPL CALC-MCNC: 72 MG/DL (ref 0–100)
LDLC/HDLC SERPL: 1.31 {RATIO}
LEUKOCYTE ESTERASE UR QL STRIP.AUTO: ABNORMAL
MICROALBUMIN/CREAT UR: NORMAL MG/G{CREAT}
NITRITE UR QL STRIP: NEGATIVE
PH UR STRIP.AUTO: 6 [PH] (ref 5–8)
POTASSIUM SERPL-SCNC: 4.3 MMOL/L (ref 3.5–5.2)
PROT SERPL-MCNC: 7.1 G/DL (ref 6–8.5)
PROT UR QL STRIP: NEGATIVE
RBC # UR STRIP: ABNORMAL /HPF
REF LAB TEST METHOD: ABNORMAL
SODIUM SERPL-SCNC: 137 MMOL/L (ref 136–145)
SP GR UR STRIP: 1.02 (ref 1–1.03)
SQUAMOUS #/AREA URNS HPF: ABNORMAL /HPF
TRIGL SERPL-MCNC: 66 MG/DL (ref 0–150)
UROBILINOGEN UR QL STRIP: ABNORMAL
VLDLC SERPL-MCNC: 13 MG/DL (ref 5–40)
WBC # UR STRIP: ABNORMAL /HPF

## 2022-05-27 PROCEDURE — 80053 COMPREHEN METABOLIC PANEL: CPT

## 2022-05-27 PROCEDURE — 80061 LIPID PANEL: CPT

## 2022-05-27 PROCEDURE — 82043 UR ALBUMIN QUANTITATIVE: CPT

## 2022-05-27 PROCEDURE — 82570 ASSAY OF URINE CREATININE: CPT

## 2022-05-27 PROCEDURE — 81001 URINALYSIS AUTO W/SCOPE: CPT

## 2022-05-27 PROCEDURE — 99214 OFFICE O/P EST MOD 30 MIN: CPT | Performed by: INTERNAL MEDICINE

## 2022-05-27 RX ORDER — LISINOPRIL 10 MG/1
10 TABLET ORAL NIGHTLY
Qty: 90 TABLET | Refills: 1 | Status: SHIPPED | OUTPATIENT
Start: 2022-05-27 | End: 2022-11-21

## 2022-05-27 RX ORDER — SPIRONOLACTONE 100 MG/1
100 TABLET, FILM COATED ORAL DAILY
Qty: 90 TABLET | Refills: 1 | Status: SHIPPED | OUTPATIENT
Start: 2022-05-27 | End: 2023-02-22 | Stop reason: SDUPTHER

## 2022-05-27 NOTE — ASSESSMENT & PLAN NOTE
Continue weightbearing exercises daily, continuing walking and using small hand weights or gardening at home.

## 2022-05-27 NOTE — ASSESSMENT & PLAN NOTE
Continue to decrease sugars and fats in the diet. Try to lose about 5 more pounds over the next 3 months.

## 2022-05-27 NOTE — ASSESSMENT & PLAN NOTE
We will continue metoprolol XL 0.5 tablet every morning and 1 whole tablet every evening, spironolactone 100 mg tablet daily. Continue lisinopril 10 mg daily. New prescription sent for the 10 mg tablet. Stop doxazosin. She will continue to monitor blood pressures at home. Goal is about 120/65 to 70 mmHg. Continue low salt diet. Continue walking daily.

## 2022-05-27 NOTE — PATIENT INSTRUCTIONS
Patient Instructions   Problem List Items Addressed This Visit          Cardiac and Vasculature    Hypercholesterolemia    Overview     Taking pravastatin every evening.           Current Assessment & Plan     Continue pravastatin every evening. Continue to improve low-fat diet and get some regular exercise.             Relevant Medications    pravastatin (PRAVACHOL) 20 MG tablet    Other Relevant Orders    Lipid Panel    Comprehensive Metabolic Panel    Benign essential hypertension - Primary    Current Assessment & Plan     We will continue metoprolol XL 0.5 tablet every morning and 1 whole tablet every evening, spironolactone 100 mg tablet daily. Continue lisinopril 10 mg daily. New prescription sent for the 10 mg tablet. Stop doxazosin. She will continue to monitor blood pressures at home. Goal is about 120/65 to 70 mmHg. Continue low salt diet. Continue walking daily.             Relevant Medications    metoprolol succinate XL (TOPROL-XL) 100 MG 24 hr tablet    spironolactone (ALDACTONE) 100 MG tablet    lisinopril (PRINIVIL,ZESTRIL) 10 MG tablet    Other Relevant Orders    Urinalysis With Microscopic - Urine, Clean Catch    Microalbumin / Creatinine Urine Ratio - Urine, Clean Catch       Eye    Hypertensive retinopathy    Current Assessment & Plan     Continue regular visits with the ophthalmologist.              Musculoskeletal and Injuries    Osteopenia of multiple sites    Overview     Last DEXA 1/2021 showed some mild improvement in osteopenia T-scores of the hip.  The lowest T score is -1.8 in the femoral neck.  Spine T-scores decreased slightly but is still very mild osteopenia at -1.1.               Current Assessment & Plan     Continue weightbearing exercises daily, continuing walking and using small hand weights or gardening at home.                Other    Overweight with body mass index (BMI) of 27 to 27.9 in adult (Chronic)    Current Assessment & Plan     Continue to decrease sugars and fats in  "the diet. Try to lose about 5 more pounds over the next 3 months.                 BMI for Adults  What is BMI?  Body mass index (BMI) is a number that is calculated from a person's weight and height. BMI can help estimate how much of a person's weight is composed of fat. BMI does not measure body fat directly. Rather, it is an alternative to procedures that directly measure body fat, which can be difficult and expensive.  BMI can help identify people who may be at higher risk for certain medical problems.  What are BMI measurements used for?  BMI is used as a screening tool to identify possible weight problems. It helps determine whether a person is obese, overweight, a healthy weight, or underweight.  BMI is useful for:  Identifying a weight problem that may be related to a medical condition or may increase the risk for medical problems.  Promoting changes, such as changes in diet and exercise, to help reach a healthy weight. BMI screening can be repeated to see if these changes are working.  How is BMI calculated?  BMI involves measuring your weight in relation to your height. Both height and weight are measured, and the BMI is calculated from those numbers. This can be done either in English ([place] or metric measurements. Note that charts and online BMI calculators are available to help you find your BMI quickly and easily without having to do these calculations yourself.  To calculate your BMI in English ([place] measurements:    Measure your weight in pounds (lb).  Multiply the number of pounds by 703.  For example, for a person who weighs 180 lb, multiply that number by 703, which equals 126,540.  Measure your height in inches. Then multiply that number by itself to get a measurement called \"inches squared.\"  For example, for a person who is 70 inches tall, the \"inches squared\" measurement is 70 inches x 70 inches, which equals 4,900 inches squared.  Divide the total from step 2 (number of lb x 703) by the total " "from step 3 (inches squared): 126,540 ÷ 4,900 = 25.8. This is your BMI.    To calculate your BMI in metric measurements:  Measure your weight in kilograms (kg).  Measure your height in meters (m). Then multiply that number by itself to get a measurement called \"meters squared.\"  For example, for a person who is 1.75 m tall, the \"meters squared\" measurement is 1.75 m x 1.75 m, which is equal to 3.1 meters squared.  Divide the number of kilograms (your weight) by the meters squared number. In this example: 70 ÷ 3.1 = 22.6. This is your BMI.  What do the results mean?  BMI charts are used to identify whether you are underweight, normal weight, overweight, or obese. The following guidelines will be used:  Underweight: BMI less than 18.5.  Normal weight: BMI between 18.5 and 24.9.  Overweight: BMI between 25 and 29.9.  Obese: BMI of 30 or above.  Keep these notes in mind:  Weight includes both fat and muscle, so someone with a muscular build, such as an athlete, may have a BMI that is higher than 24.9. In cases like these, BMI is not an accurate measure of body fat.  To determine if excess body fat is the cause of a BMI of 25 or higher, further assessments may need to be done by a health care provider.  BMI is usually interpreted in the same way for men and women.  Where to find more information  For more information about BMI, including tools to quickly calculate your BMI, go to these websites:  Centers for Disease Control and Prevention: www.cdc.gov  American Heart Association: www.heart.org  National Heart, Lung, and Blood [place]: www.nhlbi.nih.gov  Summary  Body mass index (BMI) is a number that is calculated from a person's weight and height.  BMI may help estimate how much of a person's weight is composed of fat. BMI can help identify those who may be at higher risk for certain medical problems.  BMI can be measured using English measurements or metric measurements.  BMI charts are used to identify whether you " are underweight, normal weight, overweight, or obese.  This information is not intended to replace advice given to you by your health care provider. Make sure you discuss any questions you have with your health care provider.  Document Revised: 09/09/2020 Document Reviewed: 07/17/2020  Elsevier Patient Education © 2021 [employer]

## 2022-05-27 NOTE — ASSESSMENT & PLAN NOTE
Continue pravastatin every evening. Continue to improve low-fat diet and get some regular exercise.

## 2022-05-27 NOTE — PROGRESS NOTES
[facility]     Payal Nix  1945   [id number]      Patient Care Team:  Sindy Kruger MD as PCP - General  Sindy Kruger MD as PCP - Family Medicine    Chief Complaint   Patient presents with   • Hypotension            HPI  Patient is a 77 y.o. female presents for a follow-up visit.     Hypertension-we decreased metoprolol a few months ago.  We decreased lisinopril to 1/2 tablet and spironolactone to 1/2 tablet a few weeks ago and then decrease doxazosin to 1/2 tablet.  The patient is here for a follow-up on blood pressure, and states that she is not as dizzy or as tired and her blood pressures are not running as low now. She states that her blood pressure was really low about a week ago. The patient notes that she has two blood pressure cuffs at home, and she compared them to make sure she was getting accurate readings. She states that on 05/18/2022 her blood pressure was elevated; adding she may have been doing something prior to taking her blood pressure. The patient states that her blood pressure this morning was 117/67 mmHg.     Medication  The patient reports the doxazosin was decreased. She was on lisinopril, and took it morning and night with food, but the lisinopril has been eliminated. She notes that the metoprolol has not been changed; adding she takes half in the morning and a whole in the evening. The patient is taking a whole spironolactone.     She states that she is taking half of the metoprolol in the morning and a whole in the evening. She states that she does not feel like she is urinating as much, but she has not had any lower extremity edema. She states that she is drinking plenty of water, but maybe not enough. She states that she does not really avoid salt in the diet. She denies eating salt in her diet. The patient notes that she occasionally has potato chips with a sandwich. She states that she is taking pravastatin in the evenings.     Exercise  She states  that when she ambulated for 20 minutes, she experienced hypotension, and shortness of breath. The patient was concerned of having heart issues; adding she was very fatigued. She is feeling much better with increased blood pressure readings. She states she is experiencing less shortness of breath with ambulation. She states that she would get out of breath going up and down the steps. She denies any heart palpitations, but does have occasional shortness of breath. She denies any chest pain or pressure with walking. The patient has the opportunity to visit Hospitals in [place], to do some long walks. She notes that her issues with her blood pressure created blurred vision. The patient has increased her ambulating distance.     Health maintenance  She states that she is not due for an eye exam until 2023, due to recently having cataract surgery. The patient will keep her appointment.     Dermatology  She states that she had her places checked on her back. She states that her regular checkup is not due until 10/2022.     Vaccinations  She states that she has had 1 COVID-19 booster. She states that she had a reaction to the DPT vaccine; adding that she has not had any vaccinations since then. She states that it was very sore and she had a fever. She states that she has never had a reaction with the DPT before.         CHRONIC CONDITIONS      Past Medical History:   Diagnosis Date   • Arthritis    • Cervical cancer (HCC)     Dr. Orr   • Cervical cancer (HCC)     in situ; Hysterectomy 1982   • Chronic right-sided thoracic back pain 1/24/2019   • Fatigue 1/24/2019   • Fibrocystic breast    • Hyperlipidemia    • Menopausal state        Past Surgical History:   Procedure Laterality Date   • BREAST CYST ASPIRATION     • CATARACT EXTRACTION, BILATERAL Bilateral     2021   • D & C WITH CERVICAL CONIZATION  1981   • HYSTERECTOMY  1982    Ovaries intact. cervical cancer in situ       Family History   Problem Relation Age of Onset   •  "Stroke Mother    • Tuberculosis Maternal Aunt    • Tuberculosis Maternal Grandmother    • Breast cancer Neg Hx    • Endometrial cancer Neg Hx    • Ovarian cancer Neg Hx        Social History     Socioeconomic History   • Marital status:    Tobacco Use   • Smoking status: Never Smoker   • Smokeless tobacco: Never Used   Substance and Sexual Activity   • Alcohol use: Yes     Comment: once in a while   • Drug use: No   • Sexual activity: Defer       Allergies   Allergen Reactions   • Amlodipine Besylate Other (See Comments)     edema  Other reaction(s): edema   • Benazepril Hcl Swelling   • Ciprofloxacin Other (See Comments)     Nausea and chills   • Ciprofloxacin Hcl Other (See Comments)     Nausea and chills   • Levofloxacin Nausea And Vomiting     Other reaction(s): nausea, vomiting   • Lotrel [Amlodipine Besy-Benazepril Hcl] Swelling     unknown       Review of Systems:     Review of Systems    Vital Signs  Vitals:    05/27/22 0806   BP: 116/60   BP Location: Left arm   Patient Position: Sitting   Cuff Size: Adult   Pulse: 68   Temp: 97.8 °F (36.6 °C)   TempSrc: Infrared   SpO2: 96%   Weight: 68 kg (150 lb)   Height: 157.5 cm (62.01\")   PainSc: 0-No pain     Body mass index is 27.43 kg/m².  Body mass index is 27.43 kg/m².  BMI is >= 25 and < 30. (Overweight) The following options were offered after discussion: weight loss educational material (shared in after visit summary), exercise counseling/recommendations and nutrition counseling/recommendations      Current Outpatient Medications:   •  aspirin 81 MG tablet, Take 1 tablet by mouth Daily., Disp: , Rfl:   •  B Complex Vitamins (VITAMIN B COMPLEX PO), 1 po qd, Disp: , Rfl:   •  Calcium Citrate-Vitamin D 500-500 MG-UNIT chewable tablet, 1 po 2 times a day, Disp: , Rfl:   •  Coenzyme Q10 (CO Q-10) 400 MG capsule, 1 capsule every evening, Disp: , Rfl:   •  lisinopril (PRINIVIL,ZESTRIL) 10 MG tablet, Take 1 tablet by mouth Every Night., Disp: 90 tablet, Rfl: " 1  •  magnesium oxide (MAG-OX) 400 MG tablet, Take 1 tablet by mouth Daily., Disp: , Rfl:   •  metoprolol succinate XL (TOPROL-XL) 100 MG 24 hr tablet, TAKE 1/2 (ONE-HALF) TABLET BY MOUTH IN THE MORNING AND 1 IN THE EVENING, Disp: 135 tablet, Rfl: 1  •  pravastatin (PRAVACHOL) 20 MG tablet, TAKE 1 TABLET BY MOUTH ONCE DAILY IN THE EVENING, Disp: 90 tablet, Rfl: 0  •  Proctofoam HC 1-1 % rectal foam, APPLY CREAM RECTALLY AS NEEDED, Disp: 10 g, Rfl: 0  •  spironolactone (ALDACTONE) 100 MG tablet, Take 1 tablet by mouth Daily., Disp: 90 tablet, Rfl: 1  •  vitamin C (ASCORBIC ACID) 500 MG tablet, Take 500 mg by mouth Daily., Disp: , Rfl:     Physical Exam:    Physical Exam  Vitals and nursing note reviewed.   Constitutional:       Appearance: She is well-developed.   HENT:      Head: Normocephalic.   Eyes:      Conjunctiva/sclera: Conjunctivae normal.      Pupils: Pupils are equal, round, and reactive to light.   Neck:      Thyroid: No thyromegaly.   Cardiovascular:      Rate and Rhythm: Normal rate and regular rhythm.      Heart sounds: Normal heart sounds.   Pulmonary:      Effort: Pulmonary effort is normal.      Breath sounds: Normal breath sounds.   Musculoskeletal:         General: Normal range of motion.      Cervical back: Normal range of motion and neck supple.   Lymphadenopathy:      Cervical: No cervical adenopathy.   Neurological:      Mental Status: She is alert and oriented to person, place, and time.   Psychiatric:         Thought Content: Thought content normal.          ACE III MINI        Results Review:    None    CMP:  Lab Results   Component Value Date    BUN 13 02/21/2022    CREATININE 0.83 02/21/2022    EGFRIFNONA 67 02/21/2022    BCR 15.7 02/21/2022     02/21/2022    K 4.3 02/21/2022    CO2 27.8 02/21/2022    CALCIUM 9.8 02/21/2022    ALBUMIN 4.50 02/21/2022    BILITOT 0.7 02/21/2022    ALKPHOS 73 02/21/2022    AST 15 02/21/2022    ALT 12 02/21/2022     HbA1c:  No results found for:  HGBA1C  Microalbumin:  Lab Results   Component Value Date    MICROALBUR <1.2 08/09/2021     Lipid Panel  Lab Results   Component Value Date    CHOL 158 02/21/2022    TRIG 91 02/21/2022    HDL 56 02/21/2022    LDL 85 02/21/2022    AST 15 02/21/2022    ALT 12 02/21/2022       Medication Review: Medications reviewed and noted  Patient Instructions   Problem List Items Addressed This Visit        Cardiac and Vasculature    Hypercholesterolemia    Overview     Taking pravastatin every evening.           Current Assessment & Plan     Continue pravastatin every evening. Continue to improve low-fat diet and get some regular exercise.             Relevant Medications    pravastatin (PRAVACHOL) 20 MG tablet    Other Relevant Orders    Lipid Panel    Comprehensive Metabolic Panel    Benign essential hypertension - Primary    Current Assessment & Plan     We will continue metoprolol XL 0.5 tablet every morning and 1 whole tablet every evening, spironolactone 100 mg tablet daily. Continue lisinopril 10 mg daily. New prescription sent for the 10 mg tablet. Stop doxazosin. She will continue to monitor blood pressures at home. Goal is about 120/65 to 70 mmHg. Continue low salt diet. Continue walking daily.             Relevant Medications    metoprolol succinate XL (TOPROL-XL) 100 MG 24 hr tablet    spironolactone (ALDACTONE) 100 MG tablet    lisinopril (PRINIVIL,ZESTRIL) 10 MG tablet    Other Relevant Orders    Urinalysis With Microscopic - Urine, Clean Catch    Microalbumin / Creatinine Urine Ratio - Urine, Clean Catch       Eye    Hypertensive retinopathy    Current Assessment & Plan     Continue regular visits with the ophthalmologist.              Musculoskeletal and Injuries    Osteopenia of multiple sites    Overview     Last DEXA 1/2021 showed some mild improvement in osteopenia T-scores of the hip.  The lowest T score is -1.8 in the femoral neck.  Spine T-scores decreased slightly but is still very mild osteopenia at  -1.1.               Current Assessment & Plan     Continue weightbearing exercises daily, continuing walking and using small hand weights or gardening at home.                Other    Overweight with body mass index (BMI) of 27 to 27.9 in adult (Chronic)    Current Assessment & Plan     Continue to decrease sugars and fats in the diet. Try to lose about 5 more pounds over the next 3 months.                    Diagnosis Plan   1. Benign essential hypertension  lisinopril (PRINIVIL,ZESTRIL) 10 MG tablet    Urinalysis With Microscopic - Urine, Clean Catch    Microalbumin / Creatinine Urine Ratio - Urine, Clean Catch   2. Hypercholesterolemia  Lipid Panel    Comprehensive Metabolic Panel   3. Osteopenia of multiple sites     4. Overweight with body mass index (BMI) of 27 to 27.9 in adult     5. Hypertensive retinopathy of both eyes             Plan of care reviewed with patient at the conclusion of today's visit. Education was provided regarding diagnosis, management, and any prescribed or recommended OTC medications.Patient verbalizes understanding of and agreement with management plan.         Transcribed from ambient dictation for Sindy Kruger MD by Keyla Davis.  05/27/22   14:38 EDT    Patient verbalized consent to the visit recording.  I have personally performed the services described in this document as transcribed by the above individual, and it is both accurate and complete.  Keyla Davis  5/27/2022  14:38 EDT

## 2022-05-31 ENCOUNTER — TELEPHONE (OUTPATIENT)
Dept: INTERNAL MEDICINE | Facility: CLINIC | Age: 77
End: 2022-05-31

## 2022-05-31 NOTE — TELEPHONE ENCOUNTER
----- Message from Sindy Kruger MD sent at 5/28/2022  8:24 AM EDT -----  Please call patient Tuesday morning to confirm no UTI symptoms.      LDL (bad cholesterol) is very good at 72.  Goal is less than 100.  Triglycerides are very good at 66.  Goal is less than 150.    Blood sugar, kidney and liver function, urine microalbumin, are all acceptable.    There were a few white blood cells in the urine but no other signs of infection.  Nitrites negative.  Since you are not having any urinary symptoms, we do not need to treat.

## 2022-08-14 DIAGNOSIS — E78.00 HYPERCHOLESTEROLEMIA: ICD-10-CM

## 2022-08-15 RX ORDER — PRAVASTATIN SODIUM 20 MG
TABLET ORAL
Qty: 90 TABLET | Refills: 0 | Status: SHIPPED | OUTPATIENT
Start: 2022-08-15 | End: 2022-08-16 | Stop reason: SDUPTHER

## 2022-08-16 ENCOUNTER — OFFICE VISIT (OUTPATIENT)
Dept: INTERNAL MEDICINE | Facility: CLINIC | Age: 77
End: 2022-08-16

## 2022-08-16 ENCOUNTER — LAB (OUTPATIENT)
Dept: LAB | Facility: HOSPITAL | Age: 77
End: 2022-08-16

## 2022-08-16 VITALS
SYSTOLIC BLOOD PRESSURE: 146 MMHG | BODY MASS INDEX: 26.05 KG/M2 | TEMPERATURE: 97.3 F | OXYGEN SATURATION: 97 % | HEIGHT: 63 IN | WEIGHT: 147 LBS | DIASTOLIC BLOOD PRESSURE: 70 MMHG | HEART RATE: 70 BPM

## 2022-08-16 DIAGNOSIS — R53.83 OTHER FATIGUE: ICD-10-CM

## 2022-08-16 DIAGNOSIS — H35.033 HYPERTENSIVE RETINOPATHY OF BOTH EYES: ICD-10-CM

## 2022-08-16 DIAGNOSIS — I10 BENIGN ESSENTIAL HYPERTENSION: ICD-10-CM
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
## 2022-08-16 DIAGNOSIS — E78.00 HYPERCHOLESTEROLEMIA: ICD-10-CM

## 2022-08-16 DIAGNOSIS — E66.3 OVERWEIGHT WITH BODY MASS INDEX (BMI) OF 26 TO 26.9 IN ADULT: Chronic | ICD-10-CM

## 2022-08-16 DIAGNOSIS — Z00.00 MEDICARE ANNUAL WELLNESS VISIT, SUBSEQUENT: Primary | ICD-10-CM

## 2022-08-16 DIAGNOSIS — Z00.00 ANNUAL PHYSICAL EXAM: ICD-10-CM

## 2022-08-16 DIAGNOSIS — M54.9 UPPER BACK PAIN ON LEFT SIDE: Chronic | ICD-10-CM

## 2022-08-16 DIAGNOSIS — M85.89 OSTEOPENIA OF MULTIPLE SITES: ICD-10-CM

## 2022-08-16 DIAGNOSIS — S40.022A TRAUMATIC ECCHYMOSIS OF LEFT UPPER ARM, INITIAL ENCOUNTER: Chronic | ICD-10-CM

## 2022-08-16 DIAGNOSIS — Z91.81 AT MODERATE RISK FOR FALL: Chronic | ICD-10-CM

## 2022-08-16 LAB
25(OH)D3 SERPL-MCNC: 51.2 NG/ML (ref 30–100)
ALBUMIN SERPL-MCNC: 4.7 G/DL (ref 3.5–5.2)
ALBUMIN UR-MCNC: <1.2 MG/DL
ALBUMIN/GLOB SERPL: 2 G/DL
ALP SERPL-CCNC: 64 U/L (ref 39–117)
ALT SERPL W P-5'-P-CCNC: 15 U/L (ref 1–33)
ANION GAP SERPL CALCULATED.3IONS-SCNC: 9.6 MMOL/L (ref 5–15)
AST SERPL-CCNC: 18 U/L (ref 1–32)
BASOPHILS # BLD AUTO: 0.05 10*3/MM3 (ref 0–0.2)
BASOPHILS NFR BLD AUTO: 0.7 % (ref 0–1.5)
BILIRUB SERPL-MCNC: 0.7 MG/DL (ref 0–1.2)
BILIRUB UR QL STRIP: NEGATIVE
BUN SERPL-MCNC: 23 MG/DL (ref 8–23)
BUN/CREAT SERPL: 19.3 (ref 7–25)
CALCIUM SPEC-SCNC: 10.4 MG/DL (ref 8.6–10.5)
CHLORIDE SERPL-SCNC: 98 MMOL/L (ref 98–107)
CHOLEST SERPL-MCNC: 159 MG/DL (ref 0–200)
CLARITY UR: CLEAR
CO2 SERPL-SCNC: 25.4 MMOL/L (ref 22–29)
COLOR UR: YELLOW
CREAT SERPL-MCNC: 1.19 MG/DL (ref 0.57–1)
CREAT UR-MCNC: 83.7 MG/DL
DEPRECATED RDW RBC AUTO: 38.1 FL (ref 37–54)
EGFRCR SERPLBLD CKD-EPI 2021: 47.5 ML/MIN/1.73
EOSINOPHIL # BLD AUTO: 0.17 10*3/MM3 (ref 0–0.4)
EOSINOPHIL NFR BLD AUTO: 2.3 % (ref 0.3–6.2)
ERYTHROCYTE [DISTWIDTH] IN BLOOD BY AUTOMATED COUNT: 11.9 % (ref 12.3–15.4)
GLOBULIN UR ELPH-MCNC: 2.4 GM/DL
GLUCOSE SERPL-MCNC: 108 MG/DL (ref 65–99)
GLUCOSE UR STRIP-MCNC: NEGATIVE MG/DL
HCT VFR BLD AUTO: 37.9 % (ref 34–46.6)
HDLC SERPL-MCNC: 55 MG/DL (ref 40–60)
HGB BLD-MCNC: 13 G/DL (ref 12–15.9)
HGB UR QL STRIP.AUTO: NEGATIVE
IMM GRANULOCYTES # BLD AUTO: 0.04 10*3/MM3 (ref 0–0.05)
IMM GRANULOCYTES NFR BLD AUTO: 0.5 % (ref 0–0.5)
KETONES UR QL STRIP: NEGATIVE
LDLC SERPL CALC-MCNC: 86 MG/DL (ref 0–100)
LDLC/HDLC SERPL: 1.53 {RATIO}
LEUKOCYTE ESTERASE UR QL STRIP.AUTO: ABNORMAL
LYMPHOCYTES # BLD AUTO: 1.05 10*3/MM3 (ref 0.7–3.1)
LYMPHOCYTES NFR BLD AUTO: 14 % (ref 19.6–45.3)
MCH RBC QN AUTO: 30.3 PG (ref 26.6–33)
MCHC RBC AUTO-ENTMCNC: 34.3 G/DL (ref 31.5–35.7)
MCV RBC AUTO: 88.3 FL (ref 79–97)
MICROALBUMIN/CREAT UR: NORMAL MG/G{CREAT}
MONOCYTES # BLD AUTO: 0.57 10*3/MM3 (ref 0.1–0.9)
MONOCYTES NFR BLD AUTO: 7.6 % (ref 5–12)
NEUTROPHILS NFR BLD AUTO: 5.64 10*3/MM3 (ref 1.7–7)
NEUTROPHILS NFR BLD AUTO: 74.9 % (ref 42.7–76)
NITRITE UR QL STRIP: NEGATIVE
NRBC BLD AUTO-RTO: 0 /100 WBC (ref 0–0.2)
PH UR STRIP.AUTO: 6 [PH] (ref 5–8)
PLATELET # BLD AUTO: 286 10*3/MM3 (ref 140–450)
PMV BLD AUTO: 9.9 FL (ref 6–12)
POTASSIUM SERPL-SCNC: 5.2 MMOL/L (ref 3.5–5.2)
PROT SERPL-MCNC: 7.1 G/DL (ref 6–8.5)
PROT UR QL STRIP: NEGATIVE
RBC # BLD AUTO: 4.29 10*6/MM3 (ref 3.77–5.28)
SODIUM SERPL-SCNC: 133 MMOL/L (ref 136–145)
SP GR UR STRIP: 1.01 (ref 1–1.03)
TRIGL SERPL-MCNC: 100 MG/DL (ref 0–150)
TSH SERPL DL<=0.05 MIU/L-ACNC: 4.49 UIU/ML (ref 0.27–4.2)
UROBILINOGEN UR QL STRIP: ABNORMAL
VIT B12 BLD-MCNC: 874 PG/ML (ref 211–946)
VLDLC SERPL-MCNC: 18 MG/DL (ref 5–40)
WBC NRBC COR # BLD: 7.52 10*3/MM3 (ref 3.4–10.8)

## 2022-08-16 PROCEDURE — 80053 COMPREHEN METABOLIC PANEL: CPT

## 2022-08-16 PROCEDURE — 82043 UR ALBUMIN QUANTITATIVE: CPT

## 2022-08-16 PROCEDURE — 1170F FXNL STATUS ASSESSED: CPT | Performed by: INTERNAL MEDICINE

## 2022-08-16 PROCEDURE — 84443 ASSAY THYROID STIM HORMONE: CPT

## 2022-08-16 PROCEDURE — G0439 PPPS, SUBSEQ VISIT: HCPCS | Performed by: INTERNAL MEDICINE

## 2022-08-16 PROCEDURE — 82306 VITAMIN D 25 HYDROXY: CPT

## 2022-08-16 PROCEDURE — 82570 ASSAY OF URINE CREATININE: CPT

## 2022-08-16 PROCEDURE — 82607 VITAMIN B-12: CPT

## 2022-08-16 PROCEDURE — 85025 COMPLETE CBC W/AUTO DIFF WBC: CPT

## 2022-08-16 PROCEDURE — 81003 URINALYSIS AUTO W/O SCOPE: CPT

## 2022-08-16 PROCEDURE — 80061 LIPID PANEL: CPT

## 2022-08-16 PROCEDURE — 1159F MED LIST DOCD IN RCRD: CPT | Performed by: INTERNAL MEDICINE

## 2022-08-16 PROCEDURE — 99397 PER PM REEVAL EST PAT 65+ YR: CPT | Performed by: INTERNAL MEDICINE

## 2022-08-16 RX ORDER — PRAVASTATIN SODIUM 20 MG
20 TABLET ORAL EVERY EVENING
Qty: 90 TABLET | Refills: 1 | Status: SHIPPED | OUTPATIENT
Start: 2022-08-16 | End: 2023-02-22 | Stop reason: SDUPTHER

## 2022-08-16 NOTE — ASSESSMENT & PLAN NOTE
- She is up to date on mammogram, DEXA, and colonoscopy.  - Mammogram will be due in 12/2022.  - She will get an influenza vaccine in 09/2022.  - She was advised to get a Shingrix at her pharmacy.  - She will watch for the new COVID-19 booster that covers the Omicron variants more efficiently.

## 2022-08-16 NOTE — PROGRESS NOTES
The ABCs of the Annual Wellness Visit  Initial Medicare Wellness Visit    Chief Complaint   Patient presents with   • Medicare Wellness-subsequent   • Hypertension       Subjective   History of Present Illness:  Payal Nix is a 77 y.o. female who presents for an Initial Medicare Wellness Visit.    Hypertension  At last visit we decreased the lisinopril to once a day and stopped the chlorthalidone.  The patient's blood pressure is slightly elevated today. She has lost 3 pounds since her last visit in 05/2022. Her blood pressures at home are mainly about 105 to 117 mmHg systolic and the diastolic mostly high 60s to low 70s mmHg. She has only gone up to 120s mmHg 2 times in the last 6 weeks. The patient denies feeling dizzy or lightheaded. She states that at first she was tired when her blood pressure medication was changed, but now she does not see any difference. The patient is taking metoprolol half of the 100 mg in the morning and a whole tablet in the evening. She takes spironolactone in the morning and lisinopril 10 mg in the evening. The patient reports her blood pressures are about the same in the morning than in the afternoon. Her heart rates are mostly 60 to 65 beats per minute. She denies any swelling in her ankles.    Fall  The patient states that she fell on Wednesday, 08/10/2022. She has a bruise on her left upper arm. The patient was carrying a casserole of [employer] to the basement and was wearing socks. Her feet slipped out from under her and she hit the back of her head. She states that every day some other body part has been a little sore. The patient denies any shortness of breath or headache since the fall. She states that her neck feels okay. The patient states that she has a lot of bruises. She states that she went out in the floor and did not spill a drop. The patient has cut back on her caffeine intake. She only drinks 1 to 2 cups of tea anymore. The patient is trying to drink more  water.    Hyperlipidemia   The patient takes pravastatin 20 mg. She denies any side effects. Exercising regularly     Health maintenance  The patient is seeing the eye doctor, dentist, and dermatologist. She states has an appointment in 10/2022. The patient has had 1 COVID-19 booster. She is up to date on the other vaccinations except for the newer shingles vaccine. The patient is up to date on her mammogram and bone density. Her mammogram is due in 12/2022. She is taking vitamin D and calcium. The patient is pretty much a vegetarian. She gets plenty of other protein like beans and rice. The patient eats eggs.       CHRONIC CONDITIONS:    HEALTH RISK ASSESSMENT    Recent Hospitalizations:  No hospitalization(s) within the last year.    Current Medical Providers:  Patient Care Team:  Sindy Kruger MD as PCP - General  Sindy Kruger MD as PCP - Family Medicine    Smoking Status:  Social History     Tobacco Use   Smoking Status Never Smoker   Smokeless Tobacco Never Used       Alcohol Consumption:  Social History     Substance and Sexual Activity   Alcohol Use Yes    Comment: once in a while       Depression Screen:   PHQ-2/PHQ-9 Depression Screening 8/16/2022   Retired PHQ-9 Total Score -   Retired Total Score -   Little Interest or Pleasure in Doing Things 0-->not at all   Feeling Down, Depressed or Hopeless 0-->not at all   PHQ-9: Brief Depression Severity Measure Score 0       Fall Risk Screen:  NICK Fall Risk Assessment was completed, and patient is at MODERATE risk for falls. Assessment completed on:8/16/2022    Health Habits and Functional and Cognitive Screening:  Functional & Cognitive Status 8/16/2022   Do you have difficulty preparing food and eating? No   Do you have difficulty bathing yourself, getting dressed or grooming yourself? No   Do you have difficulty using the toilet? No   Do you have difficulty moving around from place to place? No   Do you have trouble with steps or getting out of a  bed or a chair? No   Current Diet Well Balanced Diet   Dental Exam Up to date   Eye Exam Up to date   Exercise (times per week) 4 times per week   Current Exercises Include Gardening;Walking;Other        Exercise Comment -   Current Exercise Activities Include -   Do you need help using the phone?  No   Are you deaf or do you have serious difficulty hearing?  No   Do you need help with transportation? No   Do you need help shopping? No   Do you need help preparing meals?  No   Do you need help with housework?  No   Do you need help with laundry? No   Do you need help taking your medications? No   Do you need help managing money? No   Do you ever drive or ride in a car without wearing a seat belt? No   Have you felt unusual stress, anger or loneliness in the last month? No   Who do you live with? Spouse   If you need help, do you have trouble finding someone available to you? No   Have you been bothered in the last four weeks by sexual problems? No   Do you have difficulty concentrating, remembering or making decisions? No       Does the patient have evidence of cognitive impairment? No    Asprin use counseling:Taking ASA appropriately as indicated    Age-appropriate Screening Schedule:  Refer to the list below for future screening recommendations based on patient's age, sex and/or medical conditions. Orders for these recommended tests are listed in the plan section. The patient has been provided with a written plan.    Health Maintenance   Topic Date Due   • ZOSTER VACCINE (2 of 3) 06/13/2017   • INFLUENZA VACCINE  10/01/2022   • MAMMOGRAM  12/03/2022   • DXA SCAN  01/07/2023   • LIPID PANEL  08/16/2023   • TDAP/TD VACCINES (3 - Td or Tdap) 02/17/2032        The following portions of the patient's history were reviewed and updated as appropriate: allergies, current medications, past family history, past medical history, past social history, past surgical history and problem list.    Outpatient Medications Prior to  Visit   Medication Sig Dispense Refill   • aspirin 81 MG tablet Take 1 tablet by mouth Daily.     • B Complex Vitamins (VITAMIN B COMPLEX PO) 1 po qd     • Calcium Citrate-Vitamin D 500-500 MG-UNIT chewable tablet 1 po 2 times a day     • Coenzyme Q10 (CO Q-10) 400 MG capsule 1 capsule every evening     • lisinopril (PRINIVIL,ZESTRIL) 10 MG tablet Take 1 tablet by mouth Every Night. 90 tablet 1   • magnesium oxide (MAG-OX) 400 MG tablet Take 1 tablet by mouth Daily.     • metoprolol succinate XL (TOPROL-XL) 100 MG 24 hr tablet TAKE 1/2 (ONE-HALF) TABLET BY MOUTH IN THE MORNING AND 1 IN THE EVENING 135 tablet 1   • Proctofoam HC 1-1 % rectal foam APPLY CREAM RECTALLY AS NEEDED 10 g 0   • spironolactone (ALDACTONE) 100 MG tablet Take 1 tablet by mouth Daily. 90 tablet 1   • vitamin C (ASCORBIC ACID) 500 MG tablet Take 500 mg by mouth Daily.     • pravastatin (PRAVACHOL) 20 MG tablet TAKE 1 TABLET BY MOUTH ONCE DAILY IN THE EVENING 90 tablet 0     No facility-administered medications prior to visit.       Patient Active Problem List   Diagnosis   • Hemorrhoids   • Primary osteoarthritis involving multiple joints   • Hypercholesterolemia   • Hypertensive retinopathy   • Benign essential hypertension   • Osteopenia of multiple sites   • Menopausal and postmenopausal disorder   • Other fatigue   • Medicare annual wellness visit, subsequent   • Annual physical exam   • Overweight with body mass index (BMI) of 26 to 26.9 in adult   • Fibrocystic disease of left breast   • Hair loss   • Skin lesion of back   • Fall (on) (from) other stairs and steps, initial encounter   • Traumatic ecchymosis of left upper arm   • At moderate risk for fall   • Upper back pain on left side       Advanced Care Planning:  ACP discussion was held with the patient during this visit. Patient has an advance directive (not in EMR), copy requested.    Review of Systems    Compared to one year ago, the patient feels her physical health is the  "same.  Compared to one year ago, the patient feels her mental health is the same.    Reviewed chart for potential of high risk medication in the elderly: yes  Reviewed chart for potential of harmful drug interactions in the elderly:yes    Objective         Vitals:    08/16/22 0944   BP: 146/70   BP Location: Right arm   Patient Position: Sitting   Cuff Size: Adult   Pulse: 70   Temp: 97.3 °F (36.3 °C)   TempSrc: Infrared   SpO2: 97%   Weight: 66.7 kg (147 lb)   Height: 160 cm (63\")   PainSc: 0-No pain     BMI is >= 25 and <30. (Overweight) The following options were offered after discussion;: exercise counseling/recommendations and nutrition counseling/recommendations    Body mass index is 26.04 kg/m².  Discussed the patient's BMI with her. The BMI is above average; BMI management plan is completed.    Physical Exam  Vitals and nursing note reviewed.   Constitutional:       Appearance: She is well-developed.   HENT:      Head: Normocephalic.   Eyes:      Conjunctiva/sclera: Conjunctivae normal.      Pupils: Pupils are equal, round, and reactive to light.   Neck:      Thyroid: No thyromegaly.   Cardiovascular:      Rate and Rhythm: Normal rate and regular rhythm.      Heart sounds: Normal heart sounds.   Pulmonary:      Effort: Pulmonary effort is normal.      Breath sounds: Normal breath sounds. No wheezing.   Chest:   Breasts:      Right: No inverted nipple, mass, nipple discharge, skin change or tenderness.      Left: No inverted nipple, mass, nipple discharge, skin change or tenderness.        Comments: Fibrocystic changes in the left breast in the upper outer quadrant, benign, mobile, mildly tender.  Abdominal:      General: Bowel sounds are normal.      Palpations: Abdomen is soft.      Tenderness: There is no abdominal tenderness.   Musculoskeletal:         General: No tenderness. Normal range of motion.      Cervical back: Normal range of motion and neck supple.   Lymphadenopathy:      Cervical: No cervical " adenopathy.      Comments: No axillary lymphadenopathy, no supraclavicular lymphadenopathy, no pectoral lymphadenopathy.    Skin:     General: Skin is warm and dry.      Findings: No rash.      Comments: Extensive ecchymoses on the left upper arm posteriorly and laterally and ecchymoses on the left upper back and left mid back.    Neurological:      Mental Status: She is alert and oriented to person, place, and time.      Cranial Nerves: No cranial nerve deficit.      Sensory: No sensory deficit.      Coordination: Coordination normal.      Comments: Normal gait.   Psychiatric:         Speech: Speech normal.         Behavior: Behavior normal.         Thought Content: Thought content normal.         Judgment: Judgment normal.      Comments: Normal attention and normal mood.         Lab Results   Component Value Date    TRIG 100 08/16/2022    HDL 55 08/16/2022    LDL 86 08/16/2022    VLDL 18 08/16/2022        Assessment & Plan   Medicare Risks and Personalized Health Plan  CMS Preventative Services Quick Reference  Cardiovascular risk    The above risks/problems have been discussed with the patient.  Pertinent information has been shared with the patient in the After Visit Summary.  Follow up plans and orders are seen below in the Assessment/Plan Section.  Patient Instructions   Problem List Items Addressed This Visit        Advance Directives and General Issues    At moderate risk for fall (Chronic)    Current Assessment & Plan     - Education given.            Cardiac and Vasculature    Hypercholesterolemia    Overview     Taking pravastatin every evening.         Current Assessment & Plan     - Continue pravastatin every evening. Continue low-fat, low-sugar diet.         Relevant Medications    pravastatin (PRAVACHOL) 20 MG tablet    Other Relevant Orders    CBC & Differential (Completed)    Comprehensive Metabolic Panel (Completed)    Lipid Panel (Completed)    TSH (Completed)    Benign essential hypertension     Overview     Blood pressures have been very well controlled at home.    Taking spironolactone 100 mg tablet every morning.  Take lisinopril 10 mg every evening.  Taking metoprolol XL  1/2 tablet every morning and one of the 100 mg tablet every evening.         Current Assessment & Plan     - Continue taking spironolactone 100 mg tablet every morning. Continue lisinopril 10 mg every evening. Decrease metoprolol XL to 1 of the 100 mg tablets every evening and no metoprolol in the mornings. Continue to avoid salt in the diet.         Relevant Medications    metoprolol succinate XL (TOPROL-XL) 100 MG 24 hr tablet    spironolactone (ALDACTONE) 100 MG tablet    lisinopril (PRINIVIL,ZESTRIL) 10 MG tablet    Other Relevant Orders    Urinalysis without microscopic (no culture) - Urine, Clean Catch (Completed)    Microalbumin / Creatinine Urine Ratio - Urine, Clean Catch (Completed)       Eye    Hypertensive retinopathy    Current Assessment & Plan     - Continue good blood pressure control. Continue regular follow-up with the ophthalmologist.            Health Encounters    Medicare annual wellness visit, subsequent - Primary    Current Assessment & Plan     - She is up to date on mammogram, DEXA, and colonoscopy.  - Mammogram will be due in 12/2022.  - She will get an influenza vaccine in 09/2022.  - She was advised to get a Shingrix at her pharmacy.  - She will watch for the new COVID-19 booster that covers the Omicron variants more efficiently.         Annual physical exam    Current Assessment & Plan     - She is up to date on mammogram, DEXA, and colonoscopy.  - Mammogram will be due in 12/2022.  - She will get an influenza vaccine in 09/2022.  - She was advised to get a Shingrix at her pharmacy.  - She will watch for the new COVID-19 booster that covers the Omicron variants more efficiently.            Musculoskeletal and Injuries    Traumatic ecchymosis of left upper arm (Chronic)    Current Assessment & Plan     -  She will continue to watch. It will take time to improve.         Upper back pain on left side (Chronic)    Overview     Secondary to fall a week ago hitting the Left upper arm and back and head.         Current Assessment & Plan     - She may take Tylenol as needed. Moist heat may be beneficial to healing.         Osteopenia of multiple sites    Overview     Last DEXA 1/2021 showed some mild improvement in osteopenia T-scores of the hip.  The lowest T score is -1.8 in the femoral neck.  Spine T-scores decreased slightly but is still very mild osteopenia at -1.1.             Current Assessment & Plan     - She will continue regular weightbearing exercises. She will continue taking calcium and vitamin D3.         Relevant Orders    Vitamin D 25 Hydroxy (Completed)       Symptoms and Signs    Other fatigue    Relevant Orders    Vitamin B12 (Completed)       Other    Overweight with body mass index (BMI) of 26 to 26.9 in adult (Chronic)    Current Assessment & Plan     - She will continue regular exercise and low-fat, low-sugar diet.         Fall (on) (from) other stairs and steps, initial encounter (Chronic)           Follow Up:  Return in about 6 months (around 2/16/2023) for recheck fasting, labs today.     An After Visit Summary and PPPS were given to the patient.           Transcribed from ambient dictation for Sindy Kruger MD by Sahra Bearden.  08/16/22   11:40 EDT    Patient verbalized consent to the visit recording.

## 2022-08-16 NOTE — PATIENT INSTRUCTIONS
Patient Instructions   Problem List Items Addressed This Visit          Advance Directives and General Issues    At moderate risk for fall (Chronic)    Current Assessment & Plan     - Education given.            Cardiac and Vasculature    Hypercholesterolemia    Overview     Taking pravastatin every evening.         Current Assessment & Plan     - Continue pravastatin every evening. Continue low-fat, low-sugar diet.         Relevant Medications    pravastatin (PRAVACHOL) 20 MG tablet    Other Relevant Orders    CBC & Differential (Completed)    Comprehensive Metabolic Panel (Completed)    Lipid Panel (Completed)    TSH (Completed)    Benign essential hypertension    Overview     Blood pressures have been very well controlled at home.    Taking spironolactone 100 mg tablet every morning.  Take lisinopril 10 mg every evening.  Taking metoprolol XL  1/2 tablet every morning and one of the 100 mg tablet every evening.         Current Assessment & Plan     - Continue taking spironolactone 100 mg tablet every morning. Continue lisinopril 10 mg every evening. Decrease metoprolol XL to 1 of the 100 mg tablets every evening and no metoprolol in the mornings. Continue to avoid salt in the diet.         Relevant Medications    metoprolol succinate XL (TOPROL-XL) 100 MG 24 hr tablet    spironolactone (ALDACTONE) 100 MG tablet    lisinopril (PRINIVIL,ZESTRIL) 10 MG tablet    Other Relevant Orders    Urinalysis without microscopic (no culture) - Urine, Clean Catch (Completed)    Microalbumin / Creatinine Urine Ratio - Urine, Clean Catch (Completed)       Eye    Hypertensive retinopathy    Current Assessment & Plan     - Continue good blood pressure control. Continue regular follow-up with the ophthalmologist.            Health Encounters    Medicare annual wellness visit, subsequent - Primary    Current Assessment & Plan     - She is up to date on mammogram, DEXA, and colonoscopy.  - Mammogram will be due in 12/2022.  - She will  get an influenza vaccine in 09/2022.  - She was advised to get a Shingrix at her pharmacy.  - She will watch for the new COVID-19 booster that covers the Omicron variants more efficiently.         Annual physical exam    Current Assessment & Plan     - She is up to date on mammogram, DEXA, and colonoscopy.  - Mammogram will be due in 12/2022.  - She will get an influenza vaccine in 09/2022.  - She was advised to get a Shingrix at her pharmacy.  - She will watch for the new COVID-19 booster that covers the Omicron variants more efficiently.            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 It will take time to improve.         Upper back pain on left side (Chronic)    Overview     Secondary to fall a week ago hitting the Left upper arm and back and head.         Current Assessment & Plan     - She may take Tylenol as needed. Moist heat may be beneficial to healing.         Osteopenia of multiple sites    Overview     Last DEXA 1/2021 showed some mild improvement in osteopenia T-scores of the hip.  The lowest T score is -1.8 in the femoral neck.  Spine T-scores decreased slightly but is still very mild osteopenia at -1.1.             Current Assessment & Plan     - She will continue regular weightbearing exercises. She will continue taking calcium and vitamin D3.         Relevant Orders    Vitamin D 25 Hydroxy (Completed)       Symptoms and Signs    Other fatigue    Relevant Orders    Vitamin B12 (Completed)       Other    Overweight with body mass index (BMI) of 26 to 26.9 in adult (Chronic)    Current Assessment & Plan     - She will continue regular exercise and low-fat, low-sugar diet.         Fall (on) (from) other stairs and steps, initial encounter (Chronic)         Fall Prevention in the Home, Adult  Falls can cause injuries and can affect people from all age groups. There are many simple things that you  can do to make your home safe and to help prevent falls. Ask for help when making these changes, if needed.  What actions can I take to prevent falls?  General instructions  Use good lighting in all rooms. Replace any light bulbs that burn out.  Turn on lights if it is dark. Use night-lights.  Place frequently used items in easy-to-reach places. Lower the shelves around your home if necessary.  Set up furniture so that there are clear paths around it. Avoid moving your furniture around.  Remove throw rugs and other tripping hazards from the floor.  Avoid walking on wet floors.  Fix any uneven floor surfaces.  Add color or contrast paint or tape to grab bars and handrails in your home. Place contrasting color strips on the first and last steps of stairways.  When you use a stepladder, make sure that it is completely opened and that the sides are firmly locked. Have someone hold the ladder while you are using it. Do not climb a closed stepladder.  Be aware of any and all pets.  What can I do in the bathroom?         Keep the floor dry. Immediately clean up any water that spills onto the floor.  Remove soap buildup in the tub or shower on a regular basis.  Use non-skid mats or decals on the floor of the tub or shower.  Attach bath mats securely with double-sided, non-slip rug tape.  If you need to sit down while you are in the shower, use a plastic, non-slip stool.  Install grab bars by the toilet and in the tub and shower. Do not use towel bars as grab bars.  What can I do in the bedroom?  Make sure that a bedside light is easy to reach.  Do not use oversized bedding that drapes onto the floor.  Have a firm chair that has side arms to use for getting dressed.  What can I do in the kitchen?  Clean up any spills right away.  If you need to reach for something above you, use a sturdy step stool that has a grab bar.  Keep electrical cables out of the way.  Do not use floor polish or wax that makes floors slippery. If you  must use wax, make sure that it is non-skid floor wax.  What can I do in the stairways?  Do not leave any items on the stairs.  Make sure that you have a light switch at the top of the stairs and the bottom of the stairs. Have them installed if you do not have them.  Make sure that there are handrails on both sides of the stairs. Fix handrails that are broken or loose. Make sure that handrails are as long as the stairways.  Install non-slip stair treads on all stairs in your home.  Avoid having throw rugs at the top or bottom of stairways, or secure the rugs with carpet tape to prevent them from moving.  Choose a carpet design that does not hide the edge of steps on the stairway.  Check any carpeting to make sure that it is firmly attached to the stairs. Fix any carpet that is loose or worn.  What can I do on the outside of my home?  Use bright outdoor lighting.  Regularly repair the edges of walkways and driveways and fix any cracks.  Remove high doorway thresholds.  Trim any shrubbery on the main path into your home.  Regularly check that handrails are securely fastened and in good repair. Both sides of any steps should have handrails.  Install guardrails along the edges of any raised decks or porches.  Clear walkways of debris and clutter, including tools and rocks.  Have leaves, snow, and ice cleared regularly.  Use sand or salt on walkways during winter months.  In the garage, clean up any spills right away, including grease or oil spills.  What other actions can I take?  Wear closed-toe shoes that fit well and support your feet. Wear shoes that have rubber soles or low heels.  Use mobility aids as needed, such as canes, walkers, scooters, and crutches.  Review your medicines with your health care provider. Some medicines can cause dizziness or changes in blood pressure, which increase your risk of falling.  Talk with your health care provider about other ways that you can decrease your risk of falls. This may  include working with a physical therapist or  to improve your strength, balance, and endurance.  Where to find more information  Centers for Disease Control and PreventionNICK: https://www.cdc.gov  [employer] [place] [employer] Aging: https://sr1behe.lan.nih.gov  Contact a health care provider if:  You are afraid of falling at home.  You feel weak, drowsy, or dizzy at home.  You fall at home.  Summary  There are many simple things that you can do to make your home safe and to help prevent falls.  Ways to make your home safe include removing tripping hazards and installing grab bars in the bathroom.  Ask for help when making these changes in your home.  This information is not intended to replace advice given to you by your health care provider. Make sure you discuss any questions you have with your health care provider.  Document Revised: 11/30/2018 Document Reviewed: 08/02/2018  [employer] Patient Education © 2021 [employer]  BMI for Adults  What is BMI?  Body mass index (BMI) is a number that is calculated from a person's weight and height. BMI can help estimate how much of a person's weight is composed of fat. BMI does not measure body fat directly. Rather, it is an alternative to procedures that directly measure body fat, which can be difficult and expensive.  BMI can help identify people who may be at higher risk for certain medical problems.  What are BMI measurements used for?  BMI is used as a screening tool to identify possible weight problems. It helps determine whether a person is obese, overweight, a healthy weight, or underweight.  BMI is useful for:  Identifying a weight problem that may be related to a medical condition or may increase the risk for medical problems.  Promoting changes, such as changes in diet and exercise, to help reach a healthy weight. BMI screening can be repeated to see if these changes are working.  How is BMI calculated?  BMI involves measuring your weight in relation to  "your height. Both height and weight are measured, and the BMI is calculated from those numbers. This can be done either in English (U.S[place] or metric measurements. Note that charts and online BMI calculators are available to help you find your BMI quickly and easily without having to do these calculations yourself.  To calculate your BMI in English (U.S[place] measurements:    Measure your weight in pounds (lb).  Multiply the number of pounds by 703.  For example, for a person who weighs 180 lb, multiply that number by 703, which equals 126,540.  Measure your height in inches. Then multiply that number by itself to get a measurement called \"inches squared.\"  For example, for a person who is 70 inches tall, the \"inches squared\" measurement is 70 inches x 70 inches, which equals 4,900 inches squared.  Divide the total from step 2 (number of lb x 703) by the total from step 3 (inches squared): 126,540 ÷ 4,900 = 25.8. This is your BMI.    To calculate your BMI in metric measurements:  Measure your weight in kilograms (kg).  Measure your height in meters (m). Then multiply that number by itself to get a measurement called \"meters squared.\"  For example, for a person who is 1.75 m tall, the \"meters squared\" measurement is 1.75 m x 1.75 m, which is equal to 3.1 meters squared.  Divide the number of kilograms (your weight) by the meters squared number. In this example: 70 ÷ 3.1 = 22.6. This is your BMI.  What do the results mean?  BMI charts are used to identify whether you are underweight, normal weight, overweight, or obese. The following guidelines will be used:  Underweight: BMI less than 18.5.  Normal weight: BMI between 18.5 and 24.9.  Overweight: BMI between 25 and 29.9.  Obese: BMI of 30 or above.  Keep these notes in mind:  Weight includes both fat and muscle, so someone with a muscular build, such as an athlete, may have a BMI that is higher than 24.9. In cases like these, BMI is not an accurate measure of body fat.  To " determine if excess body fat is the cause of a BMI of 25 or higher, further assessments may need to be done by a health care provider.  BMI is usually interpreted in the same way for men and women.  Where to find more information  For more information about BMI, including tools to quickly calculate your BMI, go to these websites:  Centers for Disease Control and Prevention: www.cdc.gov  American Heart Association: www.heart.org  National Heart, Lung, and Blood [place]: www.nhlbi.nih.gov  Summary  Body mass index (BMI) is a number that is calculated from a person's weight and height.  BMI may help estimate how much of a person's weight is composed of fat. BMI can help identify those who may be at higher risk for certain medical problems.  BMI can be measured using English measurements or metric measurements.  BMI charts are used to identify whether you are underweight, normal weight, overweight, or obese.  This information is not intended to replace advice given to you by your health care provider. Make sure you discuss any questions you have with your health care provider.  Document Revised: 09/09/2020 Document Reviewed: 07/17/2020  [employer] Patient Education © 2021 [employer]

## 2022-08-16 NOTE — ASSESSMENT & PLAN NOTE
- She will continue regular weightbearing exercises. She will continue taking calcium and vitamin D3.

## 2022-08-16 NOTE — ASSESSMENT & PLAN NOTE
- Continue taking spironolactone 100 mg tablet every morning. Continue lisinopril 10 mg every evening. Decrease metoprolol XL to 1 of the 100 mg tablets every evening and no metoprolol in the mornings. Continue to avoid salt in the diet.

## 2022-08-17 DIAGNOSIS — E78.00 HYPERCHOLESTEROLEMIA: ICD-10-CM

## 2022-08-17 DIAGNOSIS — R79.89 ABNORMAL TSH: ICD-10-CM

## 2022-08-17 DIAGNOSIS — I10 BENIGN ESSENTIAL HYPERTENSION: Primary | ICD-10-CM

## 2022-09-02 ENCOUNTER — LAB (OUTPATIENT)
Dept: LAB | Facility: HOSPITAL | Age: 77
End: 2022-09-02

## 2022-09-02 ENCOUNTER — OFFICE VISIT (OUTPATIENT)
Dept: INTERNAL MEDICINE | Facility: CLINIC | Age: 77
End: 2022-09-02

## 2022-09-02 VITALS
HEART RATE: 76 BPM | SYSTOLIC BLOOD PRESSURE: 146 MMHG | TEMPERATURE: 98.2 F | BODY MASS INDEX: 26.05 KG/M2 | DIASTOLIC BLOOD PRESSURE: 82 MMHG | HEIGHT: 63 IN | WEIGHT: 147 LBS

## 2022-09-02 DIAGNOSIS — R30.0 DYSURIA: Primary | ICD-10-CM

## 2022-09-02 DIAGNOSIS — R30.0 DYSURIA: ICD-10-CM

## 2022-09-02 DIAGNOSIS — N28.9 RENAL INSUFFICIENCY: ICD-10-CM

## 2022-09-02 LAB
BILIRUB BLD-MCNC: ABNORMAL MG/DL
CLARITY, POC: ABNORMAL
COLOR UR: ABNORMAL
EXPIRATION DATE: ABNORMAL
GLUCOSE UR STRIP-MCNC: NEGATIVE MG/DL
KETONES UR QL: ABNORMAL
LEUKOCYTE EST, POC: ABNORMAL
Lab: ABNORMAL
NITRITE UR-MCNC: POSITIVE MG/ML
PH UR: 5.5 [PH] (ref 5–8)
PROT UR STRIP-MCNC: ABNORMAL MG/DL
RBC # UR STRIP: ABNORMAL /UL
SP GR UR: 1.02 (ref 1–1.03)
UROBILINOGEN UR QL: ABNORMAL

## 2022-09-02 PROCEDURE — 87086 URINE CULTURE/COLONY COUNT: CPT | Performed by: NURSE PRACTITIONER

## 2022-09-02 PROCEDURE — 99213 OFFICE O/P EST LOW 20 MIN: CPT | Performed by: NURSE PRACTITIONER

## 2022-09-02 PROCEDURE — 80048 BASIC METABOLIC PNL TOTAL CA: CPT

## 2022-09-02 PROCEDURE — 87077 CULTURE AEROBIC IDENTIFY: CPT | Performed by: NURSE PRACTITIONER

## 2022-09-02 PROCEDURE — 87186 SC STD MICRODIL/AGAR DIL: CPT | Performed by: NURSE PRACTITIONER

## 2022-09-02 PROCEDURE — 81003 URINALYSIS AUTO W/O SCOPE: CPT | Performed by: NURSE PRACTITIONER

## 2022-09-02 RX ORDER — CEFDINIR 300 MG/1
300 CAPSULE ORAL 2 TIMES DAILY
Qty: 10 CAPSULE | Refills: 0 | Status: SHIPPED | OUTPATIENT
Start: 2022-09-02 | End: 2022-09-07

## 2022-09-02 NOTE — PROGRESS NOTES
Follow Up Office Visit      Patient Name: Payal Nix  : 1945   MRN: [id number]   Care Team: Patient Care Team:  Sindy Kruger MD as PCP - General  Sindy Kruger MD as PCP - Family Medicine    Chief Complaint:    Chief Complaint   Patient presents with   • Urinary Tract Infection       History of Present Illness: Payal Nix is a 77 y.o. female with pertinent medical history significant for hypercholesterolemia, hypertension, history of hemorrhoids, osteoarthritis, fibrocystic breast disease, osteopenia, recent renal insufficiency noted on labs 2022.    She presents today for new acute issue of dysuria with possible UTI.  States that in the last 2 days she has had dysuria symptoms with urgency, frequency and burning sensation.  This morning she noticed dark, bloody urine.      She denies any associated fever, chills, nausea, vomiting, abdominal pain, lower back pain.      Noted that she recently had her wellness exam with PCP Dr. Kruger.  Labs conducted 2022 showing elevated creatinine of 1.19.      Subjective        I have reviewed and the following portions of the patient's history were updated as appropriate: past family history, past medical history, past social history, past surgical history and problem list.    Medications:     Current Outpatient Medications:   •  aspirin 81 MG tablet, Take 1 tablet by mouth Daily., Disp: , Rfl:   •  B Complex Vitamins (VITAMIN B COMPLEX PO), 1 po qd, Disp: , Rfl:   •  Calcium Citrate-Vitamin D 500-500 MG-UNIT chewable tablet, 1 po 2 times a day, Disp: , Rfl:   •  Coenzyme Q10 (CO Q-10) 400 MG capsule, 1 capsule every evening, Disp: , Rfl:   •  lisinopril (PRINIVIL,ZESTRIL) 10 MG tablet, Take 1 tablet by mouth Every Night., Disp: 90 tablet, Rfl: 1  •  magnesium oxide (MAG-OX) 400 MG tablet, Take 1 tablet by mouth Daily., Disp: , Rfl:   •  metoprolol succinate XL (TOPROL-XL) 100 MG 24 hr tablet, TAKE 1/2 (ONE-HALF) TABLET BY MOUTH IN THE  "MORNING AND 1 IN THE EVENING, Disp: 135 tablet, Rfl: 1  •  pravastatin (PRAVACHOL) 20 MG tablet, Take 1 tablet by mouth Every Evening., Disp: 90 tablet, Rfl: 1  •  Proctofoam HC 1-1 % rectal foam, APPLY CREAM RECTALLY AS NEEDED, Disp: 10 g, Rfl: 0  •  spironolactone (ALDACTONE) 100 MG tablet, Take 1 tablet by mouth Daily., Disp: 90 tablet, Rfl: 1  •  vitamin C (ASCORBIC ACID) 500 MG tablet, Take 500 mg by mouth Daily., Disp: , Rfl:   •  cefdinir (OMNICEF) 300 MG capsule, Take 1 capsule by mouth 2 (Two) Times a Day for 5 days., Disp: 10 capsule, Rfl: 0    Allergies:   Allergies   Allergen Reactions   • Amlodipine Besylate Other (See Comments)     edema  Other reaction(s): edema   • Benazepril Hcl Swelling   • Ciprofloxacin Other (See Comments)     Nausea and chills   • Ciprofloxacin Hcl Other (See Comments)     Nausea and chills   • Levofloxacin Nausea And Vomiting     Other reaction(s): nausea, vomiting   • Lotrel [Amlodipine Besy-Benazepril Hcl] Swelling     unknown       Objective     Physical Exam:  Vital Signs:   Vitals:    09/02/22 1336   BP: 146/82   BP Location: Left arm   Patient Position: Sitting   Cuff Size: Adult   Pulse: 76   Temp: 98.2 °F (36.8 °C)   TempSrc: Temporal   Weight: 66.7 kg (147 lb)   Height: 160 cm (62.99\")   PainSc: 0-No pain     Body mass index is 26.05 kg/m².     Physical Exam  Vitals and nursing note reviewed.   Constitutional:       General: She is not in acute distress.  HENT:      Head: Normocephalic and atraumatic.      Comments: Patient wearing facial mask.  Cardiovascular:      Rate and Rhythm: Normal rate and regular rhythm.   Pulmonary:      Effort: Pulmonary effort is normal. No respiratory distress.   Abdominal:      General: Bowel sounds are normal. There is no distension.      Tenderness: There is no right CVA tenderness or left CVA tenderness.   Neurological:      Mental Status: She is alert.   Psychiatric:         Mood and Affect: Mood normal.         Thought Content: " Thought content normal.         Judgment: Judgment normal.         Noted urine evaluation in clinic lab dark brown with sediment.      Assessment / Plan      Assessment/Plan:   Problems Addressed This Visit    ICD-10-CM ICD-9-CM   1. Dysuria  R30.0 788.1   2. Renal insufficiency  N28.9 593.9      Dysuria  -Noted significantly abnormal UA with large amount of leukocytes, positive nitrites, 300 mg protein, 15 mg ketones, 1Billi, moderate bilirubin, large amount of blood.  -Start Omnicef 300 mg twice daily x5-day  -Recommended adequate hydration, mainly with H2O and occasional electrolyte replacement beverages    Renal insufficiency  -Concern for RADHA given above UA findings  -Rechecking BMP at this time    Plan of care reviewed with patient at the conclusion of today's visit. Education was provided regarding diagnosis and management.  Patient verbalizes understanding of and agreement with management plan.      Follow Up:   Return if symptoms worsen or fail to improve, for Next scheduled follow up.        DEEPA Lo  [facility] [address]    Please note that portions of this note were completed with a voice recognition program.

## 2022-09-03 LAB
ANION GAP SERPL CALCULATED.3IONS-SCNC: 6 MMOL/L (ref 5–15)
BUN SERPL-MCNC: 19 MG/DL (ref 8–23)
BUN/CREAT SERPL: 17.6 (ref 7–25)
CALCIUM SPEC-SCNC: 10.1 MG/DL (ref 8.6–10.5)
CHLORIDE SERPL-SCNC: 101 MMOL/L (ref 98–107)
CO2 SERPL-SCNC: 25 MMOL/L (ref 22–29)
CREAT SERPL-MCNC: 1.08 MG/DL (ref 0.57–1)
EGFRCR SERPLBLD CKD-EPI 2021: 53.3 ML/MIN/1.73
GLUCOSE SERPL-MCNC: 90 MG/DL (ref 65–99)
POTASSIUM SERPL-SCNC: 4.7 MMOL/L (ref 3.5–5.2)
SODIUM SERPL-SCNC: 132 MMOL/L (ref 136–145)

## 2022-09-04 LAB — BACTERIA SPEC AEROBE CULT: ABNORMAL

## 2022-09-07 ENCOUNTER — TELEPHONE (OUTPATIENT)
Dept: INTERNAL MEDICINE | Facility: CLINIC | Age: 77
End: 2022-09-07

## 2022-09-07 NOTE — TELEPHONE ENCOUNTER
SABRA for pt to call back.    ----- Message from DEEPA Gutierres sent at 9/7/2022 12:44 PM EDT -----  Please let patient know that her kidney function was still slightly elevated but stable from last check 3 weeks ago.  Her urine culture did indicate an infection, which is appropriately treated with the antibiotic therapy that was prescribed to her at visit.  Please be sure to complete the course of this medication.

## 2022-09-07 NOTE — TELEPHONE ENCOUNTER
Patient called the office and she was given the message from Winnie Florian regarding lab results.    Patient verbalized with understanding. She said she took her last antibiotic today, however, she has noticed bright red spots in her underwear.  Denies any back or abdominal pain.  Wants to know if she should have further tests.

## 2022-09-08 NOTE — TELEPHONE ENCOUNTER
Is not clear if this spotting was vaginal versus urological?  If she is having persistent symptoms of spotting in her underwear, then likely will need another visit to do further examination

## 2022-09-08 NOTE — TELEPHONE ENCOUNTER
I spoke with patient.  She stated she had a little bit of blood in her underwear this morning.  She can't tell where it's coming from.  She will monitor over the weekend and call us Monday if persistent and will make an appointment.

## 2022-11-10 RX ORDER — METOPROLOL SUCCINATE 100 MG/1
TABLET, EXTENDED RELEASE ORAL
Qty: 135 TABLET | Refills: 0 | Status: SHIPPED | OUTPATIENT
Start: 2022-11-10 | End: 2023-02-22 | Stop reason: SDUPTHER

## 2022-11-21 DIAGNOSIS — I10 BENIGN ESSENTIAL HYPERTENSION: ICD-10-CM

## 2022-11-21 RX ORDER — LISINOPRIL 10 MG/1
TABLET ORAL
Qty: 90 TABLET | Refills: 1 | Status: SHIPPED | OUTPATIENT
Start: 2022-11-21

## 2022-12-28 ENCOUNTER — TELEPHONE (OUTPATIENT)
Dept: INTERNAL MEDICINE | Facility: CLINIC | Age: 77
End: 2022-12-28

## 2022-12-28 ENCOUNTER — OFFICE VISIT (OUTPATIENT)
Dept: INTERNAL MEDICINE | Facility: CLINIC | Age: 77
End: 2022-12-28

## 2022-12-28 VITALS
DIASTOLIC BLOOD PRESSURE: 72 MMHG | BODY MASS INDEX: 25.8 KG/M2 | OXYGEN SATURATION: 99 % | HEIGHT: 63 IN | WEIGHT: 145.6 LBS | SYSTOLIC BLOOD PRESSURE: 142 MMHG | HEART RATE: 65 BPM | TEMPERATURE: 98 F

## 2022-12-28 DIAGNOSIS — I10 BENIGN ESSENTIAL HYPERTENSION: ICD-10-CM

## 2022-12-28 DIAGNOSIS — L03.113 CELLULITIS OF RIGHT UPPER EXTREMITY: Primary | Chronic | ICD-10-CM
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
## 2022-12-28 PROBLEM — L03.90 CELLULITIS: Chronic | Status: ACTIVE | Noted: 2022-12-28

## 2022-12-28 PROCEDURE — 87186 SC STD MICRODIL/AGAR DIL: CPT | Performed by: INTERNAL MEDICINE

## 2022-12-28 PROCEDURE — 99214 OFFICE O/P EST MOD 30 MIN: CPT | Performed by: INTERNAL MEDICINE

## 2022-12-28 PROCEDURE — 87070 CULTURE OTHR SPECIMN AEROBIC: CPT | Performed by: INTERNAL MEDICINE

## 2022-12-28 PROCEDURE — 87147 CULTURE TYPE IMMUNOLOGIC: CPT | Performed by: INTERNAL MEDICINE

## 2022-12-28 PROCEDURE — 87205 SMEAR GRAM STAIN: CPT | Performed by: INTERNAL MEDICINE

## 2022-12-28 PROCEDURE — 87077 CULTURE AEROBIC IDENTIFY: CPT | Performed by: INTERNAL MEDICINE

## 2022-12-28 RX ORDER — CEPHALEXIN 500 MG/1
500 TABLET ORAL 3 TIMES DAILY
Qty: 21 TABLET | Refills: 1 | Status: SHIPPED | OUTPATIENT
Start: 2022-12-28 | End: 2023-01-04

## 2022-12-28 NOTE — ASSESSMENT & PLAN NOTE
- Education given on preventing falls at home.   - Advised to be very careful on stairs particularly and do not rush.

## 2022-12-28 NOTE — ASSESSMENT & PLAN NOTE
- Begin cephalexin 3 times per day.   - Advised to watch the arm for any extension of redness past the markings I put on the arm today.   - Advised to change the dressing daily and apply Aquaphor to the wound.   - If there is development of fever, chills, myalgias, nausea, increased arm pain, or increased swelling of the arm, she will let us know.   - Continue drinking lots of fluids.

## 2022-12-28 NOTE — ASSESSMENT & PLAN NOTE
Continue current blood pressure medications since her blood pressures are well controlled at home.  Today it is elevated most likely due to her injury and worried about that.

## 2022-12-28 NOTE — PROGRESS NOTES
"[facility]     Payal Nix  1945   [id number]      Patient Care Team:  Sindy Kruger MD as PCP - General  Sindy Kruger MD as PCP - Family Medicine    Chief Complaint   Patient presents with   • Joint Swelling     Right elbow; pt states it started \"leaking\"   • Hyperlipidemia   • Hypertension            HPI  Patient is a 77 y.o. female presents with a right elbow injury and laceration.    Right elbow injury  Ms. Nix reports she fell approximately 8 days ago and hit her right elbow on a post after slipping on her bottom step. This did result in a small laceration to the right elbow but she reports no other injuries from the fall. She denies having applied a cold pack to the right elbow following the incident but did use a wound wash and took Tylenol as needed.      Two days ago, her elbow began to swell and become red with a feeling of fluid beneath the skin, which she believed to be a hematoma. She notes a tight sensation similar to a \"floor burn\". The patient states she has full mobility of the elbow and denies any pain, citing only soreness and a \"weird\" bruised sensation. Of note, she does report the wound began draining a mostly clear fluid this morning. Ms. Nix denies fever, chills, body aches, and loss of appetite.     The patient denies any drug allergy to cephalexin.       CHRONIC CONDITIONS      Past Medical History:   Diagnosis Date   • Arthritis    • Cervical cancer (HCC)     Dr. Orr   • Cervical cancer (HCC)     in situ; Hysterectomy 1982   • Chronic right-sided thoracic back pain 1/24/2019   • Fatigue 1/24/2019   • Fibrocystic breast    • Hyperlipidemia    • Menopausal state        Past Surgical History:   Procedure Laterality Date   • BREAST CYST ASPIRATION     • CATARACT EXTRACTION, BILATERAL Bilateral     2021   • D & C WITH CERVICAL CONIZATION  1981   • HYSTERECTOMY  1982    Ovaries intact. cervical cancer in situ       Family History   Problem Relation Age of " "Onset   • Stroke Mother    • Tuberculosis Maternal Aunt    • Tuberculosis Maternal Grandmother    • Breast cancer Neg Hx    • Endometrial cancer Neg Hx    • Ovarian cancer Neg Hx        Social History     Socioeconomic History   • Marital status:    Tobacco Use   • Smoking status: Never   • Smokeless tobacco: Never   Substance and Sexual Activity   • Alcohol use: Yes     Comment: once in a while   • Drug use: No   • Sexual activity: Defer       Allergies   Allergen Reactions   • Amlodipine Besylate Other (See Comments)     edema  Other reaction(s): edema   • Benazepril Hcl Swelling   • Ciprofloxacin Other (See Comments)     Nausea and chills   • Ciprofloxacin Hcl Other (See Comments)     Nausea and chills   • Levofloxacin Nausea And Vomiting     Other reaction(s): nausea, vomiting   • Lotrel [Amlodipine Besy-Benazepril Hcl] Swelling     unknown       Review of Systems:     Review of Systems   The appropriate review of systems is included in the HPI.     Vital Signs  Vitals:    12/28/22 1427   BP: 142/72   BP Location: Left arm   Patient Position: Sitting   Cuff Size: Adult   Pulse: 65   Temp: 98 °F (36.7 °C)   TempSrc: Infrared   SpO2: 99%   Weight: 66 kg (145 lb 9.6 oz)   Height: 160 cm (62.99\")   PainSc: 0-No pain     Body mass index is 25.8 kg/m².  BMI is >= 25 and <30. (Overweight) The following options were offered after discussion;: exercise counseling/recommendations and nutrition counseling/recommendations        Current Outpatient Medications:   •  aspirin 81 MG tablet, Take 1 tablet by mouth Daily., Disp: , Rfl:   •  B Complex Vitamins (VITAMIN B COMPLEX PO), 1 po qd, Disp: , Rfl:   •  Calcium Citrate-Vitamin D 500-500 MG-UNIT chewable tablet, 1 po 2 times a day, Disp: , Rfl:   •  Coenzyme Q10 (CO Q-10) 400 MG capsule, 1 capsule every evening, Disp: , Rfl:   •  lisinopril (PRINIVIL,ZESTRIL) 10 MG tablet, TAKE 1 TABLET BY MOUTH ONCE DAILY AT NIGHT, Disp: 90 tablet, Rfl: 1  •  magnesium oxide (MAG-OX) " 400 MG tablet, Take 1 tablet by mouth Daily., Disp: , Rfl:   •  metoprolol succinate XL (TOPROL-XL) 100 MG 24 hr tablet, TAKE 1/2 (ONE-HALF) TABLET BY MOUTH ONCE DAILY IN THE MORNING AND 1 IN THE EVENING (Patient taking differently: Daily. TAKE 1 IN THE EVENING), Disp: 135 tablet, Rfl: 0  •  pravastatin (PRAVACHOL) 20 MG tablet, Take 1 tablet by mouth Every Evening., Disp: 90 tablet, Rfl: 1  •  Proctofoam HC 1-1 % rectal foam, APPLY CREAM RECTALLY AS NEEDED, Disp: 10 g, Rfl: 0  •  spironolactone (ALDACTONE) 100 MG tablet, Take 1 tablet by mouth Daily., Disp: 90 tablet, Rfl: 1  •  vitamin C (ASCORBIC ACID) 500 MG tablet, Take 500 mg by mouth Daily., Disp: , Rfl:   •  Cephalexin 500 MG tablet, Take 500 mg by mouth 3 (Three) Times a Day for 7 days., Disp: 21 tablet, Rfl: 1    Physical Exam:    Physical Exam  Vitals and nursing note reviewed.   Constitutional:       Appearance: She is well-developed.   HENT:      Head: Normocephalic.   Eyes:      Conjunctiva/sclera: Conjunctivae normal.      Pupils: Pupils are equal, round, and reactive to light.   Neck:      Thyroid: No thyromegaly.   Cardiovascular:      Rate and Rhythm: Normal rate and regular rhythm.      Heart sounds: Normal heart sounds.   Pulmonary:      Effort: Pulmonary effort is normal.      Breath sounds: Normal breath sounds. No wheezing.   Musculoskeletal:         General: Normal range of motion.      Right elbow: Swelling and laceration present. Normal range of motion.      Cervical back: Normal range of motion and neck supple.      Comments: Right soft tissue swelling and erythema surrounding the elbow and volar arm surface. 1 cm laceration on the elbow is present and draining pus.   Lymphadenopathy:      Cervical: No cervical adenopathy.   Skin:     General: Skin is warm and dry.   Neurological:      Mental Status: She is alert and oriented to person, place, and time.   Psychiatric:         Thought Content: Thought content normal.          ACE III MINI         Results Review:    None    CMP:  Lab Results   Component Value Date    BUN 19 09/02/2022    CREATININE 1.08 (H) 09/02/2022    EGFRIFNONA 67 02/21/2022    BCR 17.6 09/02/2022     (L) 09/02/2022    K 4.7 09/02/2022    CO2 25.0 09/02/2022    CALCIUM 10.1 09/02/2022    ALBUMIN 4.70 08/16/2022    BILITOT 0.7 08/16/2022    ALKPHOS 64 08/16/2022    AST 18 08/16/2022    ALT 15 08/16/2022     HbA1c:  No results found for: HGBA1C  Microalbumin:  Lab Results   Component Value Date    MICROALBUR <1.2 08/16/2022     Lipid Panel  Lab Results   Component Value Date    CHOL 159 08/16/2022    TRIG 100 08/16/2022    HDL 55 08/16/2022    LDL 86 08/16/2022    AST 18 08/16/2022    ALT 15 08/16/2022       Medication Review: Medications reviewed and noted  Patient Instructions   Problem List Items Addressed This Visit        Cardiac and Vasculature    Benign essential hypertension    Overview     Blood pressures have been very well controlled at home.    Taking spironolactone 100 mg tablet every morning.  Take lisinopril 10 mg every evening.  Taking metoprolol XL one of the 100 mg tablet every evening.         Current Assessment & Plan     Continue current blood pressure medications since her blood pressures are well controlled at home.  Today it is elevated most likely due to her injury and worried about that.         Relevant Medications    spironolactone (ALDACTONE) 100 MG tablet    metoprolol succinate XL (TOPROL-XL) 100 MG 24 hr tablet    lisinopril (PRINIVIL,ZESTRIL) 10 MG tablet       Infectious Diseases    Cellulitis - Primary (Chronic)    Current Assessment & Plan     - Begin cephalexin 3 times per day.   - Advised to watch the arm for any extension of redness past the markings I put on the arm today.   - Advised to change the dressing daily and apply Aquaphor to the wound.   - If there is development of fever, chills, myalgias, nausea, increased arm pain, or increased swelling of the arm, she will let us know.   -  Continue drinking lots of fluids.         Relevant Orders    Wound Culture - Wound, Arm (Completed)       Other    Fall (on) (from) other stairs and steps, initial encounter (Chronic)    Current Assessment & Plan     - Education given on preventing falls at home.   - Advised to be very careful on stairs particularly and do not rush.               Diagnosis Plan   1. Cellulitis of right upper extremity  Wound Culture - Wound, Arm    Wound Culture - Wound, Arm      2. Fall (on) (from) other stairs and steps, initial encounter        3. Benign essential hypertension                Plan of care reviewed with patient at the conclusion of today's visit. Education was provided regarding diagnosis, management, and any prescribed or recommended OTC medications.Patient verbalizes understanding of and agreement with management plan.      Transcribed from ambient dictation for Sindy Kruger MD by Millie Funez.  12/28/22   15:55 EST    Patient or patient representative verbalized consent to the visit recording.

## 2022-12-28 NOTE — TELEPHONE ENCOUNTER
Pharmacy Name:  [facility]    Pharmacy representative name: SYMONE    Pharmacy representative phone number:[phone]    What medication are you calling in regards to: Cephalexin 500 MG tablet    What question does the pharmacy have: INSURANCE DOES NOT WANT TO COVER THE TABLETS BUT WILL COVER THE CAPSIULES . PHARMACY NEEDS TO KNOW IF IT IS OKAY TO CHANGE THIS.     Who is the provider that prescribed the medication: PERICO PORTILLO MD

## 2022-12-29 NOTE — PATIENT INSTRUCTIONS
Patient Instructions   Problem List Items Addressed This Visit          Cardiac and Vasculature    Benign essential hypertension    Overview     Blood pressures have been very well controlled at home.    Taking spironolactone 100 mg tablet every morning.  Take lisinopril 10 mg every evening.  Taking metoprolol XL one of the 100 mg tablet every evening.         Current Assessment & Plan     Continue current blood pressure medications since her blood pressures are well controlled at home.  Today it is elevated most likely due to her injury and worried about that.         Relevant Medications    spironolactone (ALDACTONE) 100 MG tablet    metoprolol succinate XL (TOPROL-XL) 100 MG 24 hr tablet    lisinopril (PRINIVIL,ZESTRIL) 10 MG tablet       Infectious Diseases    Cellulitis - Primary (Chronic)    Current Assessment & Plan     - Begin cephalexin 3 times per day.   - Advised to watch the arm for any extension of redness past the markings I put on the arm today.   - Advised to change the dressing daily and apply Aquaphor to the wound.   - If there is development of fever, chills, myalgias, nausea, increased arm pain, or increased swelling of the arm, she will let us know.   - Continue drinking lots of fluids.         Relevant Orders    Wound Culture - Wound, Arm (Completed)       Other    Fall (on) (from) other stairs and steps, initial encounter (Chronic)    Current Assessment & Plan     - Education given on preventing falls at home.   - Advised to be very careful on stairs particularly and do not rush.

## 2022-12-30 ENCOUNTER — TELEPHONE (OUTPATIENT)
Dept: INTERNAL MEDICINE | Facility: CLINIC | Age: 77
End: 2022-12-30

## 2022-12-30 DIAGNOSIS — Z22.322 MRSA (METHICILLIN RESISTANT STAPH AUREUS) CULTURE POSITIVE: Primary | ICD-10-CM

## 2022-12-30 RX ORDER — DOXYCYCLINE HYCLATE 100 MG/1
100 CAPSULE ORAL 2 TIMES DAILY
Qty: 14 CAPSULE | Refills: 0 | Status: SHIPPED | OUTPATIENT
Start: 2022-12-30 | End: 2023-01-06

## 2022-12-30 RX ORDER — DOXYCYCLINE HYCLATE 100 MG
100 TABLET ORAL 2 TIMES DAILY
Qty: 14 TABLET | Refills: 0 | Status: SHIPPED | OUTPATIENT
Start: 2022-12-30 | End: 2022-12-30

## 2022-12-30 NOTE — TELEPHONE ENCOUNTER
Karen from [facility] pharmacy called to see if the provider would change the Doxycycline from a tablet to a capsule form.

## 2023-01-01 LAB
BACTERIA SPEC AEROBE CULT: ABNORMAL
BACTERIA SPEC AEROBE CULT: ABNORMAL
GRAM STN SPEC: ABNORMAL
GRAM STN SPEC: ABNORMAL

## 2023-02-22 ENCOUNTER — LAB (OUTPATIENT)
Dept: LAB | Facility: HOSPITAL | Age: 78
End: 2023-02-22
Payer: MEDICARE

## 2023-02-22 ENCOUNTER — OFFICE VISIT (OUTPATIENT)
Dept: INTERNAL MEDICINE | Facility: CLINIC | Age: 78
End: 2023-02-22
Payer: MEDICARE

## 2023-02-22 VITALS
HEART RATE: 62 BPM | WEIGHT: 143 LBS | TEMPERATURE: 96.9 F | OXYGEN SATURATION: 99 % | HEIGHT: 63 IN | SYSTOLIC BLOOD PRESSURE: 122 MMHG | BODY MASS INDEX: 25.34 KG/M2 | DIASTOLIC BLOOD PRESSURE: 60 MMHG

## 2023-02-22 DIAGNOSIS — E78.00 HYPERCHOLESTEROLEMIA: ICD-10-CM

## 2023-02-22 DIAGNOSIS — N95.9 MENOPAUSAL AND POSTMENOPAUSAL DISORDER: ICD-10-CM

## 2023-02-22 DIAGNOSIS — H35.033 HYPERTENSIVE RETINOPATHY OF BOTH EYES: ICD-10-CM

## 2023-02-22 DIAGNOSIS — I10 BENIGN ESSENTIAL HYPERTENSION: ICD-10-CM

## 2023-02-22 DIAGNOSIS — Z86.14 HISTORY OF MRSA INFECTION: ICD-10-CM

## 2023-02-22 DIAGNOSIS — I10 BENIGN ESSENTIAL HYPERTENSION: Primary | ICD-10-CM

## 2023-02-22 DIAGNOSIS — E66.3 OVERWEIGHT WITH BODY MASS INDEX (BMI) OF 25 TO 25.9 IN ADULT: Chronic | ICD-10-CM

## 2023-02-22 DIAGNOSIS — M85.89 OSTEOPENIA OF MULTIPLE SITES: ICD-10-CM

## 2023-02-22 DIAGNOSIS — Z12.31 BREAST CANCER SCREENING BY MAMMOGRAM: ICD-10-CM

## 2023-02-22 PROBLEM — L03.90 CELLULITIS: Chronic | Status: RESOLVED | Noted: 2022-12-28 | Resolved: 2023-02-22

## 2023-02-22 LAB
ALBUMIN SERPL-MCNC: 4.6 G/DL (ref 3.5–5.2)
ALBUMIN UR-MCNC: <1.2 MG/DL
ALBUMIN/GLOB SERPL: 1.8 G/DL
ALP SERPL-CCNC: 63 U/L (ref 39–117)
ALT SERPL W P-5'-P-CCNC: 11 U/L (ref 1–33)
ANION GAP SERPL CALCULATED.3IONS-SCNC: 8.1 MMOL/L (ref 5–15)
AST SERPL-CCNC: 13 U/L (ref 1–32)
BACTERIA UR QL AUTO: ABNORMAL /HPF
BILIRUB SERPL-MCNC: 0.7 MG/DL (ref 0–1.2)
BILIRUB UR QL STRIP: NEGATIVE
BUN SERPL-MCNC: 19 MG/DL (ref 8–23)
BUN/CREAT SERPL: 17.4 (ref 7–25)
CALCIUM SPEC-SCNC: 10.2 MG/DL (ref 8.6–10.5)
CHLORIDE SERPL-SCNC: 105 MMOL/L (ref 98–107)
CHOLEST SERPL-MCNC: 156 MG/DL (ref 0–200)
CLARITY UR: CLEAR
CO2 SERPL-SCNC: 26.9 MMOL/L (ref 22–29)
COLOR UR: YELLOW
CREAT SERPL-MCNC: 1.09 MG/DL (ref 0.57–1)
CREAT UR-MCNC: 91.2 MG/DL
EGFRCR SERPLBLD CKD-EPI 2021: 52.4 ML/MIN/1.73
GLOBULIN UR ELPH-MCNC: 2.5 GM/DL
GLUCOSE SERPL-MCNC: 97 MG/DL (ref 65–99)
GLUCOSE UR STRIP-MCNC: NEGATIVE MG/DL
HDLC SERPL-MCNC: 55 MG/DL (ref 40–60)
HGB UR QL STRIP.AUTO: NEGATIVE
HYALINE CASTS UR QL AUTO: ABNORMAL /LPF
KETONES UR QL STRIP: NEGATIVE
LDLC SERPL CALC-MCNC: 85 MG/DL (ref 0–100)
LDLC/HDLC SERPL: 1.53 {RATIO}
LEUKOCYTE ESTERASE UR QL STRIP.AUTO: ABNORMAL
MICROALBUMIN/CREAT UR: NORMAL MG/G{CREAT}
NITRITE UR QL STRIP: NEGATIVE
PH UR STRIP.AUTO: 6 [PH] (ref 5–8)
POTASSIUM SERPL-SCNC: 4.4 MMOL/L (ref 3.5–5.2)
PROT SERPL-MCNC: 7.1 G/DL (ref 6–8.5)
PROT UR QL STRIP: NEGATIVE
RBC # UR STRIP: ABNORMAL /HPF
REF LAB TEST METHOD: ABNORMAL
SODIUM SERPL-SCNC: 140 MMOL/L (ref 136–145)
SP GR UR STRIP: 1.02 (ref 1–1.03)
SQUAMOUS #/AREA URNS HPF: ABNORMAL /HPF
TRIGL SERPL-MCNC: 85 MG/DL (ref 0–150)
UROBILINOGEN UR QL STRIP: ABNORMAL
VLDLC SERPL-MCNC: 16 MG/DL (ref 5–40)
WBC # UR STRIP: ABNORMAL /HPF

## 2023-02-22 PROCEDURE — 99214 OFFICE O/P EST MOD 30 MIN: CPT | Performed by: INTERNAL MEDICINE

## 2023-02-22 PROCEDURE — 81001 URINALYSIS AUTO W/SCOPE: CPT

## 2023-02-22 PROCEDURE — 80061 LIPID PANEL: CPT

## 2023-02-22 PROCEDURE — 82043 UR ALBUMIN QUANTITATIVE: CPT

## 2023-02-22 PROCEDURE — 80053 COMPREHEN METABOLIC PANEL: CPT

## 2023-02-22 PROCEDURE — 82570 ASSAY OF URINE CREATININE: CPT

## 2023-02-22 PROCEDURE — 87081 CULTURE SCREEN ONLY: CPT | Performed by: INTERNAL MEDICINE

## 2023-02-22 RX ORDER — DOXYCYCLINE HYCLATE 100 MG
1 TABLET ORAL EVERY 12 HOURS SCHEDULED
COMMUNITY
Start: 2022-12-30 | End: 2023-02-22

## 2023-02-22 RX ORDER — PRAVASTATIN SODIUM 20 MG
20 TABLET ORAL EVERY EVENING
Qty: 90 TABLET | Refills: 1 | Status: SHIPPED | OUTPATIENT
Start: 2023-02-22

## 2023-02-22 RX ORDER — SPIRONOLACTONE 100 MG/1
100 TABLET, FILM COATED ORAL DAILY
Qty: 90 TABLET | Refills: 1 | Status: SHIPPED | OUTPATIENT
Start: 2023-02-22

## 2023-02-22 RX ORDER — METOPROLOL SUCCINATE 100 MG/1
100 TABLET, EXTENDED RELEASE ORAL NIGHTLY
Qty: 90 TABLET | Refills: 1 | Status: SHIPPED | OUTPATIENT
Start: 2023-02-22 | End: 2023-03-23

## 2023-02-22 NOTE — PROGRESS NOTES
[facility]     Payal Nix  1945   [id number]      Patient Care Team:  Sindy Kruger MD as PCP - General  Sindy Kruger MD as PCP - Family Medicine  Bry Allen MD as Consulting Physician (Ophthalmology)    Chief Complaint   Patient presents with   • Hypertension            HPI  Patient is a 78 y.o. female presents today for a 6 month follow-up.      Health maintenance  The patient is inquiring about the new shingles vaccine. She is up to date on all other vaccines. The patient did not have a mammogram due to receiving the COVID-19 vaccine. She is due for a bone density scan. She performs monthly self breast exams, she notes an increase in lumps when consuming more tea. She had a colonoscopy in 2013, she denies any polyps and no family history of colon cancer. She has increased her water intake.     Hypertension  The patient's blood pressure today is 122/60 mmHg. She notes she has similar readings at home. The patient has lost 4 to 5 pounds since last summer. She walks and does yoga for exercise. The patient denies any side effects from her medications. She is taking metoprolol 1 tablet in the evening. The patient is taking lisinopril and a baby aspirin. She denies any swelling in her lower extremities.  The patient is fasting today.    History of MRSA  The patient took doxycycline for 1 week for MRSA on her elbow. She states the elbow healed up. The patient denies any tenderness. She was at the hospital with her . Her  did not have MRSA.    Hypertensive retinopathy  The patient has not seen her eye doctor this year. She sees Dr. Bry Allen.    Dental  The patient sees a dentist regularly.        CHRONIC CONDITIONS      Past Medical History:   Diagnosis Date   • Arthritis    • Cellulitis 12/28/2022   • Cervical cancer (HCC)     Dr. Orr   • Cervical cancer (HCC)     in situ; Hysterectomy 1982   • Chronic right-sided thoracic back pain 1/24/2019   • Fatigue  "1/24/2019   • Fibrocystic breast    • Hyperlipidemia    • Menopausal state        Past Surgical History:   Procedure Laterality Date   • BREAST CYST ASPIRATION     • CATARACT EXTRACTION, BILATERAL Bilateral     2021   • D & C WITH CERVICAL CONIZATION  1981   • HYSTERECTOMY  1982    Ovaries intact. cervical cancer in situ       Family History   Problem Relation Age of Onset   • Stroke Mother    • Tuberculosis Maternal Aunt    • Tuberculosis Maternal Grandmother    • Breast cancer Neg Hx    • Endometrial cancer Neg Hx    • Ovarian cancer Neg Hx        Social History     Socioeconomic History   • Marital status:    Tobacco Use   • Smoking status: Never   • Smokeless tobacco: Never   Substance and Sexual Activity   • Alcohol use: Yes     Comment: once in a while   • Drug use: No   • Sexual activity: Defer       Allergies   Allergen Reactions   • Amlodipine Besylate Other (See Comments)     edema  Other reaction(s): edema   • Benazepril Hcl Swelling   • Ciprofloxacin Other (See Comments)     Nausea and chills   • Ciprofloxacin Hcl Other (See Comments)     Nausea and chills   • Levofloxacin Nausea And Vomiting     Other reaction(s): nausea, vomiting   • Lotrel [Amlodipine Besy-Benazepril Hcl] Swelling     unknown       Review of Systems:     Review of Systems    Vital Signs  Vitals:    02/22/23 0910   BP: 122/60   BP Location: Left arm   Patient Position: Sitting   Cuff Size: Adult   Pulse: 62   Temp: 96.9 °F (36.1 °C)   TempSrc: Infrared   SpO2: 99%   Weight: 64.9 kg (143 lb)   Height: 160 cm (62.99\")   PainSc: 0-No pain     Body mass index is 25.34 kg/m².  BMI is >= 25 and <30. (Overweight) The following options were offered after discussion;: exercise counseling/recommendations and nutrition counseling/recommendations        Current Outpatient Medications:   •  aspirin 81 MG tablet, Take 1 tablet by mouth Daily., Disp: , Rfl:   •  B Complex Vitamins (VITAMIN B COMPLEX PO), 1 po qd, Disp: , Rfl:   •  Calcium " Citrate-Vitamin D 500-500 MG-UNIT chewable tablet, 1 po 2 times a day, Disp: , Rfl:   •  Coenzyme Q10 (CO Q-10) 400 MG capsule, 1 capsule every evening, Disp: , Rfl:   •  lisinopril (PRINIVIL,ZESTRIL) 10 MG tablet, TAKE 1 TABLET BY MOUTH ONCE DAILY AT NIGHT, Disp: 90 tablet, Rfl: 1  •  magnesium oxide (MAG-OX) 400 MG tablet, Take 1 tablet by mouth Daily., Disp: , Rfl:   •  metoprolol succinate XL (TOPROL-XL) 100 MG 24 hr tablet, Take 1 tablet by mouth Every Night., Disp: 90 tablet, Rfl: 1  •  pravastatin (PRAVACHOL) 20 MG tablet, Take 1 tablet by mouth Every Evening., Disp: 90 tablet, Rfl: 1  •  Proctofoam HC 1-1 % rectal foam, APPLY CREAM RECTALLY AS NEEDED, Disp: 10 g, Rfl: 0  •  spironolactone (ALDACTONE) 100 MG tablet, Take 1 tablet by mouth Daily., Disp: 90 tablet, Rfl: 1  •  vitamin C (ASCORBIC ACID) 500 MG tablet, Take 500 mg by mouth Daily., Disp: , Rfl:     Physical Exam:    Physical Exam  Vitals and nursing note reviewed.   Constitutional:       Appearance: She is well-developed.   HENT:      Head: Normocephalic.   Eyes:      Conjunctiva/sclera: Conjunctivae normal.      Pupils: Pupils are equal, round, and reactive to light.   Neck:      Thyroid: No thyromegaly.   Cardiovascular:      Rate and Rhythm: Normal rate and regular rhythm.      Heart sounds: Normal heart sounds.   Pulmonary:      Effort: Pulmonary effort is normal.      Breath sounds: Normal breath sounds. No wheezing.   Musculoskeletal:         General: Normal range of motion.      Cervical back: Normal range of motion and neck supple.      Comments: No edema.   Lymphadenopathy:      Cervical: No cervical adenopathy.   Skin:     General: Skin is warm and dry.      Comments: Elbow infection completely resolved.    Neurological:      Mental Status: She is alert and oriented to person, place, and time.   Psychiatric:         Thought Content: Thought content normal.          ACE III MINI        Results Review:    None    CMP:  Lab Results    Component Value Date    BUN 19 02/22/2023    CREATININE 1.09 (H) 02/22/2023    EGFRIFNONA 67 02/21/2022    BCR 17.4 02/22/2023     02/22/2023    K 4.4 02/22/2023    CO2 26.9 02/22/2023    CALCIUM 10.2 02/22/2023    ALBUMIN 4.6 02/22/2023    BILITOT 0.7 02/22/2023    ALKPHOS 63 02/22/2023    AST 13 02/22/2023    ALT 11 02/22/2023     HbA1c:  No results found for: HGBA1C  Microalbumin:  Lab Results   Component Value Date    MICROALBUR <1.2 02/22/2023     Lipid Panel  Lab Results   Component Value Date    CHOL 156 02/22/2023    TRIG 85 02/22/2023    HDL 55 02/22/2023    LDL 85 02/22/2023    AST 13 02/22/2023    ALT 11 02/22/2023       Medication Review: Medications reviewed and noted  Patient Instructions   Problem List Items Addressed This Visit        Cardiac and Vasculature    Hypercholesterolemia    Overview     Taking pravastatin every evening.         Current Assessment & Plan     - Continue pravastatin every evening.  - Continue low-fat, low-sugar diet.  - Continue regular exercise.           Relevant Medications    pravastatin (PRAVACHOL) 20 MG tablet    Other Relevant Orders    Lipid Panel (Completed)    Benign essential hypertension - Primary    Overview     Taking spironolactone 100 mg tablet every morning.  Take lisinopril 10 mg every evening.  Taking metoprolol XL one of the 100 mg tablet every evening.         Current Assessment & Plan     - Continue spironolactone, lisinopril, and metoprolol.  - Continue to avoid salt in the diet.         Relevant Medications    lisinopril (PRINIVIL,ZESTRIL) 10 MG tablet    spironolactone (ALDACTONE) 100 MG tablet    metoprolol succinate XL (TOPROL-XL) 100 MG 24 hr tablet    Other Relevant Orders    Comprehensive Metabolic Panel (Completed)    Microalbumin / Creatinine Urine Ratio - Urine, Clean Catch (Completed)    Urinalysis With Microscopic - Urine, Clean Catch (Completed)       Eye    Hypertensive retinopathy    Overview     Regular follow up with Dr. Londono  Alejandro.         Current Assessment & Plan     - Continue regular follow-up with Dr. Bry Allen.            Genitourinary and Reproductive     Menopausal and postmenopausal disorder    Relevant Orders    DEXA Bone Density Axial       Infectious Diseases    History of MRSA infection    Current Assessment & Plan     - Elbow soft tissue infection improved.  - We will swab the nares today to rule out colonization. If it is positive, we will use mupirocin ointment twice a day in the nares for 2 weeks.         Relevant Orders    MRSA Screen Culture (Outpatient) - Swab, Nares       Musculoskeletal and Injuries    Osteopenia of multiple sites    Overview     Last DEXA 1/2021 showed some mild improvement in osteopenia T-scores of the hip.  The lowest T score is -1.8 in the femoral neck.  Spine T-scores decreased slightly but is still very mild osteopenia at -1.1.             Current Assessment & Plan     - Continue weight-bearing exercises with walking regularly and using small hand weights at home when relaxing or watching TV.  - Continue taking calcium and vitamin D.            Other    Overweight with body mass index (BMI) of 25 to 25.9 in adult (Chronic)    Current Assessment & Plan     - Continue regular exercise and low-fat, low-sugar diet.  - She has lost 8 to 10 pounds over the last 6 months or so.        Other Visit Diagnoses     Breast cancer screening by mammogram        Relevant Orders    Mammo Screening Digital Tomosynthesis Bilateral With CAD             Diagnosis Plan   1. Benign essential hypertension  spironolactone (ALDACTONE) 100 MG tablet    metoprolol succinate XL (TOPROL-XL) 100 MG 24 hr tablet    Comprehensive Metabolic Panel    Microalbumin / Creatinine Urine Ratio - Urine, Clean Catch    Urinalysis With Microscopic - Urine, Clean Catch      2. Hypercholesterolemia  pravastatin (PRAVACHOL) 20 MG tablet    Lipid Panel      3. Osteopenia of multiple sites        4. Hypertensive retinopathy of both eyes         5. History of MRSA infection  MRSA Screen Culture (Outpatient) - Swab, Nares    MRSA Screen Culture (Outpatient) - Swab, Nares      6. Overweight with body mass index (BMI) of 25 to 25.9 in adult        7. Menopausal and postmenopausal disorder  DEXA Bone Density Axial      8. Breast cancer screening by mammogram  Mammo Screening Digital Tomosynthesis Bilateral With CAD              Plan of care reviewed with patient at the conclusion of today's visit. Education was provided regarding diagnosis, management, and any prescribed or recommended OTC medications.Patient verbalizes understanding of and agreement with management plan.         Sindy Kruger MD      Transcribed from ambient dictation for Sindy Kruger MD by Pamela Rosas.  02/22/23   12:14 EST    Patient or patient representative verbalized consent to the visit recording.  I have personally performed the services described in this document as transcribed by the above individual, and it is both accurate and complete.  Sindy Kruger MD  2/22/2023  21:14 EST

## 2023-02-22 NOTE — ASSESSMENT & PLAN NOTE
- Continue regular exercise and low-fat, low-sugar diet.  - She has lost 8 to 10 pounds over the last 6 months or so.

## 2023-02-22 NOTE — ASSESSMENT & PLAN NOTE
- Continue pravastatin every evening.  - Continue low-fat, low-sugar diet.  - Continue regular exercise.

## 2023-02-22 NOTE — ASSESSMENT & PLAN NOTE
- Elbow soft tissue infection improved.  - We will swab the nares today to rule out colonization. If it is positive, we will use mupirocin ointment twice a day in the nares for 2 weeks.

## 2023-02-22 NOTE — ASSESSMENT & PLAN NOTE
- Continue weight-bearing exercises with walking regularly and using small hand weights at home when relaxing or watching TV.  - Continue taking calcium and vitamin D.

## 2023-02-23 NOTE — PATIENT INSTRUCTIONS
Patient Instructions   Problem List Items Addressed This Visit          Cardiac and Vasculature    Hypercholesterolemia    Overview     Taking pravastatin every evening.         Current Assessment & Plan     - Continue pravastatin every evening.  - Continue low-fat, low-sugar diet.  - Continue regular exercise.           Relevant Medications    pravastatin (PRAVACHOL) 20 MG tablet    Other Relevant Orders    Lipid Panel (Completed)    Benign essential hypertension - Primary    Overview     Taking spironolactone 100 mg tablet every morning.  Take lisinopril 10 mg every evening.  Taking metoprolol XL one of the 100 mg tablet every evening.         Current Assessment & Plan     - Continue spironolactone, lisinopril, and metoprolol.  - Continue to avoid salt in the diet.         Relevant Medications    lisinopril (PRINIVIL,ZESTRIL) 10 MG tablet    spironolactone (ALDACTONE) 100 MG tablet    metoprolol succinate XL (TOPROL-XL) 100 MG 24 hr tablet    Other Relevant Orders    Comprehensive Metabolic Panel (Completed)    Microalbumin / Creatinine Urine Ratio - Urine, Clean Catch (Completed)    Urinalysis With Microscopic - Urine, Clean Catch (Completed)       Eye    Hypertensive retinopathy    Overview     Regular follow up with Dr. Bry Allen.         Current Assessment & Plan     - Continue regular follow-up with Dr. Bry Allen.            Genitourinary and Reproductive     Menopausal and postmenopausal disorder    Relevant Orders    DEXA Bone Density Axial       Infectious Diseases    History of MRSA infection    Current Assessment & Plan     - Elbow soft tissue infection improved.  - We will swab the nares today to rule out colonization. If it is positive, we will use mupirocin ointment twice a day in the nares for 2 weeks.         Relevant Orders    MRSA Screen Culture (Outpatient) - Swab, Nares       Musculoskeletal and Injuries    Osteopenia of multiple sites    Overview     Last DEXA 1/2021 showed some mild  improvement in osteopenia T-scores of the hip.  The lowest T score is -1.8 in the femoral neck.  Spine T-scores decreased slightly but is still very mild osteopenia at -1.1.             Current Assessment & Plan     - Continue weight-bearing exercises with walking regularly and using small hand weights at home when relaxing or watching TV.  - Continue taking calcium and vitamin D.            Other    Overweight with body mass index (BMI) of 25 to 25.9 in adult (Chronic)    Current Assessment & Plan     - Continue regular exercise and low-fat, low-sugar diet.  - She has lost 8 to 10 pounds over the last 6 months or so.          Other Visit Diagnoses       Breast cancer screening by mammogram        Relevant Orders    Mammo Screening Digital Tomosynthesis Bilateral With CAD

## 2023-02-24 LAB — MRSA SPEC QL CULT: NORMAL

## 2023-03-22 DIAGNOSIS — I10 BENIGN ESSENTIAL HYPERTENSION: ICD-10-CM

## 2023-03-23 RX ORDER — METOPROLOL SUCCINATE 100 MG/1
TABLET, EXTENDED RELEASE ORAL
Qty: 135 TABLET | Refills: 0 | Status: SHIPPED | OUTPATIENT
Start: 2023-03-23

## 2023-04-14 ENCOUNTER — HOSPITAL ENCOUNTER (OUTPATIENT)
Dept: MAMMOGRAPHY | Facility: HOSPITAL | Age: 78
Discharge: HOME | End: 2023-04-14
Admitting: INTERNAL MEDICINE
Payer: MEDICARE

## 2023-04-14 DIAGNOSIS — Z12.31 BREAST CANCER SCREENING BY MAMMOGRAM: ICD-10-CM

## 2023-04-14 PROCEDURE — 77063 BREAST TOMOSYNTHESIS BI: CPT

## 2023-04-14 PROCEDURE — 77067 SCR MAMMO BI INCL CAD: CPT

## 2023-04-27 ENCOUNTER — HOSPITAL ENCOUNTER (OUTPATIENT)
Dept: BONE DENSITY | Facility: HOSPITAL | Age: 78
Discharge: HOME | End: 2023-04-27
Payer: MEDICARE

## 2023-04-27 DIAGNOSIS — N95.9 MENOPAUSAL AND POSTMENOPAUSAL DISORDER: ICD-10-CM

## 2023-04-27 PROCEDURE — 77080 DXA BONE DENSITY AXIAL: CPT

## 2023-05-08 DIAGNOSIS — M85.89 OSTEOPENIA OF MULTIPLE SITES: Primary | ICD-10-CM

## 2023-05-08 RX ORDER — ALENDRONATE SODIUM 70 MG/1
70 TABLET ORAL
Qty: 15 TABLET | Refills: 1 | Status: SHIPPED | OUTPATIENT
Start: 2023-05-08

## 2023-05-20 DIAGNOSIS — I10 BENIGN ESSENTIAL HYPERTENSION: ICD-10-CM

## 2023-05-22 RX ORDER — LISINOPRIL 10 MG/1
TABLET ORAL
Qty: 90 TABLET | Refills: 0 | Status: SHIPPED | OUTPATIENT
Start: 2023-05-22

## 2023-08-13 DIAGNOSIS — I10 BENIGN ESSENTIAL HYPERTENSION: ICD-10-CM

## 2023-08-14 RX ORDER — METOPROLOL SUCCINATE 100 MG/1
TABLET, EXTENDED RELEASE ORAL
Qty: 135 TABLET | Refills: 0 | Status: SHIPPED | OUTPATIENT
Start: 2023-08-14

## 2023-08-22 ENCOUNTER — OFFICE VISIT (OUTPATIENT)
Dept: INTERNAL MEDICINE | Facility: CLINIC | Age: 78
End: 2023-08-22
Payer: MEDICARE

## 2023-08-22 ENCOUNTER — LAB (OUTPATIENT)
Dept: LAB | Facility: HOSPITAL | Age: 78
End: 2023-08-22
Payer: MEDICARE

## 2023-08-22 VITALS
WEIGHT: 138 LBS | TEMPERATURE: 97.3 F | HEIGHT: 63 IN | DIASTOLIC BLOOD PRESSURE: 56 MMHG | BODY MASS INDEX: 24.45 KG/M2 | SYSTOLIC BLOOD PRESSURE: 130 MMHG | HEART RATE: 60 BPM | OXYGEN SATURATION: 94 %

## 2023-08-22 DIAGNOSIS — E78.00 HYPERCHOLESTEROLEMIA: ICD-10-CM

## 2023-08-22 DIAGNOSIS — M15.9 PRIMARY OSTEOARTHRITIS INVOLVING MULTIPLE JOINTS: ICD-10-CM

## 2023-08-22 DIAGNOSIS — M85.89 OSTEOPENIA OF MULTIPLE SITES: ICD-10-CM

## 2023-08-22 DIAGNOSIS — I10 BENIGN ESSENTIAL HYPERTENSION: ICD-10-CM

## 2023-08-22 DIAGNOSIS — Z00.00 ANNUAL PHYSICAL EXAM: ICD-10-CM

## 2023-08-22 DIAGNOSIS — Z00.00 MEDICARE ANNUAL WELLNESS VISIT, SUBSEQUENT: Primary | ICD-10-CM

## 2023-08-22 DIAGNOSIS — H35.033 HYPERTENSIVE RETINOPATHY OF BOTH EYES: ICD-10-CM

## 2023-08-22 DIAGNOSIS — R23.3 EASY BRUISING: Chronic | ICD-10-CM

## 2023-08-22 DIAGNOSIS — E66.3 OVERWEIGHT WITH BODY MASS INDEX (BMI) OF 25 TO 25.9 IN ADULT: Chronic | ICD-10-CM

## 2023-08-22 LAB
25(OH)D3 SERPL-MCNC: 42.6 NG/ML (ref 30–100)
ALBUMIN SERPL-MCNC: 4.7 G/DL (ref 3.5–5.2)
ALBUMIN UR-MCNC: <1.2 MG/DL
ALBUMIN/GLOB SERPL: 1.8 G/DL
ALP SERPL-CCNC: 62 U/L (ref 39–117)
ALT SERPL W P-5'-P-CCNC: 12 U/L (ref 1–33)
ANION GAP SERPL CALCULATED.3IONS-SCNC: 10.3 MMOL/L (ref 5–15)
AST SERPL-CCNC: 17 U/L (ref 1–32)
BACTERIA UR QL AUTO: ABNORMAL /HPF
BASOPHILS # BLD AUTO: 0.04 10*3/MM3 (ref 0–0.2)
BASOPHILS NFR BLD AUTO: 0.6 % (ref 0–1.5)
BILIRUB SERPL-MCNC: 0.6 MG/DL (ref 0–1.2)
BILIRUB UR QL STRIP: NEGATIVE
BUN SERPL-MCNC: 25 MG/DL (ref 8–23)
BUN/CREAT SERPL: 18.2 (ref 7–25)
CALCIUM SPEC-SCNC: 10.8 MG/DL (ref 8.6–10.5)
CHLORIDE SERPL-SCNC: 101 MMOL/L (ref 98–107)
CHOLEST SERPL-MCNC: 145 MG/DL (ref 0–200)
CLARITY UR: CLEAR
CO2 SERPL-SCNC: 22.7 MMOL/L (ref 22–29)
COLOR UR: YELLOW
CREAT SERPL-MCNC: 1.37 MG/DL (ref 0.57–1)
CREAT UR-MCNC: 73.2 MG/DL
DEPRECATED RDW RBC AUTO: 39.8 FL (ref 37–54)
EGFRCR SERPLBLD CKD-EPI 2021: 39.9 ML/MIN/1.73
EOSINOPHIL # BLD AUTO: 0.13 10*3/MM3 (ref 0–0.4)
EOSINOPHIL NFR BLD AUTO: 1.9 % (ref 0.3–6.2)
ERYTHROCYTE [DISTWIDTH] IN BLOOD BY AUTOMATED COUNT: 12.1 % (ref 12.3–15.4)
GLOBULIN UR ELPH-MCNC: 2.6 GM/DL
GLUCOSE SERPL-MCNC: 99 MG/DL (ref 65–99)
GLUCOSE UR STRIP-MCNC: NEGATIVE MG/DL
HCT VFR BLD AUTO: 36.1 % (ref 34–46.6)
HDLC SERPL-MCNC: 51 MG/DL (ref 40–60)
HGB BLD-MCNC: 12.1 G/DL (ref 12–15.9)
HGB UR QL STRIP.AUTO: NEGATIVE
HYALINE CASTS UR QL AUTO: ABNORMAL /LPF
IMM GRANULOCYTES # BLD AUTO: 0.03 10*3/MM3 (ref 0–0.05)
IMM GRANULOCYTES NFR BLD AUTO: 0.4 % (ref 0–0.5)
KETONES UR QL STRIP: NEGATIVE
LDLC SERPL CALC-MCNC: 80 MG/DL (ref 0–100)
LDLC/HDLC SERPL: 1.56 {RATIO}
LEUKOCYTE ESTERASE UR QL STRIP.AUTO: ABNORMAL
LYMPHOCYTES # BLD AUTO: 0.86 10*3/MM3 (ref 0.7–3.1)
LYMPHOCYTES NFR BLD AUTO: 12.3 % (ref 19.6–45.3)
MCH RBC QN AUTO: 30 PG (ref 26.6–33)
MCHC RBC AUTO-ENTMCNC: 33.5 G/DL (ref 31.5–35.7)
MCV RBC AUTO: 89.6 FL (ref 79–97)
MICROALBUMIN/CREAT UR: NORMAL MG/G{CREAT}
MONOCYTES # BLD AUTO: 0.5 10*3/MM3 (ref 0.1–0.9)
MONOCYTES NFR BLD AUTO: 7.2 % (ref 5–12)
NEUTROPHILS NFR BLD AUTO: 5.41 10*3/MM3 (ref 1.7–7)
NEUTROPHILS NFR BLD AUTO: 77.6 % (ref 42.7–76)
NITRITE UR QL STRIP: NEGATIVE
NRBC BLD AUTO-RTO: 0 /100 WBC (ref 0–0.2)
PH UR STRIP.AUTO: 5.5 [PH] (ref 5–8)
PLATELET # BLD AUTO: 269 10*3/MM3 (ref 140–450)
PMV BLD AUTO: 9.8 FL (ref 6–12)
POTASSIUM SERPL-SCNC: 5.4 MMOL/L (ref 3.5–5.2)
PROT SERPL-MCNC: 7.3 G/DL (ref 6–8.5)
PROT UR QL STRIP: NEGATIVE
RBC # BLD AUTO: 4.03 10*6/MM3 (ref 3.77–5.28)
RBC # UR STRIP: ABNORMAL /HPF
REF LAB TEST METHOD: ABNORMAL
SODIUM SERPL-SCNC: 134 MMOL/L (ref 136–145)
SP GR UR STRIP: 1.01 (ref 1–1.03)
SQUAMOUS #/AREA URNS HPF: ABNORMAL /HPF
TRIGL SERPL-MCNC: 72 MG/DL (ref 0–150)
TSH SERPL DL<=0.05 MIU/L-ACNC: 2.85 UIU/ML (ref 0.27–4.2)
UROBILINOGEN UR QL STRIP: ABNORMAL
VLDLC SERPL-MCNC: 14 MG/DL (ref 5–40)
WBC # UR STRIP: ABNORMAL /HPF
WBC NRBC COR # BLD: 6.97 10*3/MM3 (ref 3.4–10.8)

## 2023-08-22 PROCEDURE — 99397 PER PM REEVAL EST PAT 65+ YR: CPT | Performed by: INTERNAL MEDICINE

## 2023-08-22 PROCEDURE — 80053 COMPREHEN METABOLIC PANEL: CPT

## 2023-08-22 PROCEDURE — 84443 ASSAY THYROID STIM HORMONE: CPT

## 2023-08-22 PROCEDURE — 3078F DIAST BP <80 MM HG: CPT | Performed by: INTERNAL MEDICINE

## 2023-08-22 PROCEDURE — G0439 PPPS, SUBSEQ VISIT: HCPCS | Performed by: INTERNAL MEDICINE

## 2023-08-22 PROCEDURE — 1160F RVW MEDS BY RX/DR IN RCRD: CPT | Performed by: INTERNAL MEDICINE

## 2023-08-22 PROCEDURE — 82306 VITAMIN D 25 HYDROXY: CPT

## 2023-08-22 PROCEDURE — 85025 COMPLETE CBC W/AUTO DIFF WBC: CPT

## 2023-08-22 PROCEDURE — 99214 OFFICE O/P EST MOD 30 MIN: CPT | Performed by: INTERNAL MEDICINE

## 2023-08-22 PROCEDURE — 3075F SYST BP GE 130 - 139MM HG: CPT | Performed by: INTERNAL MEDICINE

## 2023-08-22 PROCEDURE — 82570 ASSAY OF URINE CREATININE: CPT

## 2023-08-22 PROCEDURE — 80061 LIPID PANEL: CPT

## 2023-08-22 PROCEDURE — 93000 ELECTROCARDIOGRAM COMPLETE: CPT | Performed by: INTERNAL MEDICINE

## 2023-08-22 PROCEDURE — 1159F MED LIST DOCD IN RCRD: CPT | Performed by: INTERNAL MEDICINE

## 2023-08-22 PROCEDURE — 81001 URINALYSIS AUTO W/SCOPE: CPT

## 2023-08-22 PROCEDURE — 82043 UR ALBUMIN QUANTITATIVE: CPT

## 2023-08-22 PROCEDURE — 1170F FXNL STATUS ASSESSED: CPT | Performed by: INTERNAL MEDICINE

## 2023-08-22 RX ORDER — PRAVASTATIN SODIUM 20 MG
20 TABLET ORAL EVERY EVENING
Qty: 90 TABLET | Refills: 1 | Status: SHIPPED | OUTPATIENT
Start: 2023-08-22

## 2023-08-22 RX ORDER — SPIRONOLACTONE 100 MG/1
100 TABLET, FILM COATED ORAL DAILY
Qty: 90 TABLET | Refills: 1 | Status: SHIPPED | OUTPATIENT
Start: 2023-08-22

## 2023-08-22 RX ORDER — VITAMIN E 268 MG
400 CAPSULE ORAL DAILY
COMMUNITY
End: 2023-08-22

## 2023-08-22 RX ORDER — CHOLECALCIFEROL (VITAMIN D3) 125 MCG
500 CAPSULE ORAL DAILY
COMMUNITY

## 2023-08-22 RX ORDER — METOPROLOL SUCCINATE 100 MG/1
100 TABLET, EXTENDED RELEASE ORAL EVERY EVENING
Qty: 90 TABLET | Refills: 1 | Status: SHIPPED | OUTPATIENT
Start: 2023-08-22

## 2023-08-22 RX ORDER — LISINOPRIL 10 MG/1
10 TABLET ORAL NIGHTLY
Qty: 90 TABLET | Refills: 1 | Status: SHIPPED | OUTPATIENT
Start: 2023-08-22

## 2023-08-22 NOTE — ASSESSMENT & PLAN NOTE
We will check labs today. We will decrease her 81 mg aspirin to just 2 or 3 days a week. She will stop taking vitamin E.

## 2023-08-22 NOTE — ASSESSMENT & PLAN NOTE
We discussed possible side effects of Fosamax and the fact that the benefits of preventing fractures far outweighs the possible rare side effects. She will start taking it once a week. She will continue calcium and vitamin D3. She will continue regular weightbearing exercises with walking and gardening and going to the strength training classes. She could also use some small hand weights at home while watching TV this winter.

## 2023-08-22 NOTE — ASSESSMENT & PLAN NOTE
She is up to date on immunizations except for the new shingles vaccine, which she will get at her pharmacy. She is up to date on colonoscopy, DEXA, and mammogram.

## 2023-08-22 NOTE — PATIENT INSTRUCTIONS
Patient Instructions  Problem List Items Addressed This Visit          Cardiac and Vasculature    Hypercholesterolemia    Overview     Taking pravastatin every evening.         Current Assessment & Plan     Continue pravastatin every evening. Continue low-fat diet and regular exercise.         Relevant Medications    pravastatin (PRAVACHOL) 20 MG tablet    Other Relevant Orders    Lipid Panel (Completed)    TSH (Completed)    Benign essential hypertension    Overview     Taking spironolactone 100 mg tablet every morning.  Take lisinopril 10 mg every evening.  Taking metoprolol XL one of the 100 mg tablet every evening.         Current Assessment & Plan     We will continue spironolactone, lisinopril, and metoprolol. She will continue to avoid salt in the diet.         Relevant Medications    spironolactone (ALDACTONE) 100 MG tablet    lisinopril (PRINIVIL,ZESTRIL) 10 MG tablet    metoprolol succinate XL (TOPROL-XL) 100 MG 24 hr tablet    Other Relevant Orders    CBC & Differential (Completed)    Comprehensive Metabolic Panel (Completed)    Microalbumin / Creatinine Urine Ratio - Urine, Clean Catch    Urinalysis With Microscopic - Urine, Clean Catch    ECG 12 Lead       Eye    Hypertensive retinopathy    Overview     Regular follow up with Dr. Bry Allen.         Current Assessment & Plan     She will take coated aspirin 2 or 3 days a week. She will follow up with Dr. Bry Allen.            Health Encounters    Medicare annual wellness visit, subsequent - Primary    Current Assessment & Plan     She is up to date on immunizations except for the new shingles vaccine, which she will get at her pharmacy. She is up to date on colonoscopy, DEXA, and mammogram.         Annual physical exam    Current Assessment & Plan     She is up to date on immunizations except for the new shingles vaccine, which she will get at her pharmacy. She is up to date on colonoscopy, DEXA, and mammogram.            Musculoskeletal and Injuries     Primary osteoarthritis involving multiple joints    Current Assessment & Plan     She will continue to be physically active. She may take Tylenol as needed.         Osteopenia of multiple sites    Overview     DEXA 1/2021 showed some mild improvement in osteopenia T-scores of the hip.  The lowest T score is -1.8 in the femoral neck.  Spine T-scores decreased slightly but is still very mild osteopenia at -1.1.  DEXA 4/27/23 DEXA 4/2023 shows decline in bone density with the lowest T score in the lumbar spine at -1.4 and the lowest T score in the femoral neck at -2.2.     I recommended starting Fosamax (alendronate) tablet but she has not started it yet due to concern over side effects.             Current Assessment & Plan     We discussed possible side effects of Fosamax and the fact that the benefits of preventing fractures far outweighs the possible rare side effects. She will start taking it once a week. She will continue calcium and vitamin D3. She will continue regular weightbearing exercises with walking and gardening and going to the strength training classes. She could also use some small hand weights at home while watching TV this winter.         Relevant Medications    alendronate (FOSAMAX) 70 MG tablet    Other Relevant Orders    Vitamin D,25-Hydroxy (Completed)       Skin    Easy bruising (Chronic)    Current Assessment & Plan     We will check labs today. We will decrease her 81 mg aspirin to just 2 or 3 days a week. She will stop taking vitamin E.            Other    RESOLVED: Overweight with body mass index (BMI) of 25 to 25.9 in adult (Chronic)

## 2023-08-22 NOTE — ASSESSMENT & PLAN NOTE
We will continue spironolactone, lisinopril, and metoprolol. She will continue to avoid salt in the diet.

## 2023-08-22 NOTE — PROGRESS NOTES
The ABCs of the Annual Wellness Visit  Initial Medicare Wellness Visit    Chief Complaint   Patient presents with    Medicare Wellness-subsequent    Hypertension     Fasting       Subjective   History of Present Illness:  Payal Nix is a 78 y.o. female who presents for an Initial Medicare Wellness Visit.    CHRONIC CONDITIONS:     Annual wellness visit  -The patient's EKG is good.   - She denies any falls.   - The patient got all 3 of her words correct.   - She has seen the dentist.  - The patient is taking a strength training class 2 days a week. She is gardening and walking as well.   - She had a mammogram, and it was normal. The patient checks her breasts at home.     Basal cell carcinoma  The patient had a basal cell carcinoma removed from her right cheek twice. She had to go back, and she was told it has grown again. The patient was told to have Mohs surgery on her right cheek in 04/2023, but he could not get her in until 09/2023. She is thinking about calling her again and see if she just wants to take some of it off because it is bothering her.    Hypertension  The patient's blood pressure at home has been okay. Her diastolic number is in the 50s to 60s mmHg. Her systolic number will hit 140 mmHg, but not very often. The patient has gone to the dentist to have it checked and other places, and it has been normal. Her blood pressure is usually 130 mmHg or less and is 130 mmHg today. The patient is taking spironolactone and lisinopril. She denies any trouble with any of her medications. The patient takes metoprolol 1 tablet in the evening. She is only taking the spironolactone in the morning.    Hypertensive retinopathy  The patient saw the eye doctor in 11/2022. She had cataract surgery 2 to 3 years ago, and it went well. The patient uses her readers once in a while. He has not mentioned anything about the hypertension in her eyes. She was told she does not need to come back every year, but she feels safer  about doing so.     Hypercholesterolemia  The patient is trying to eat low fat and low sugar most of the time. She is taking pravastatin in the evening.    Overweight  The patient has lost 20 pounds. Her BMI is 24.9 kg/m2. She does not drink soda very often. The patient has tried to cut back on her tea intake. She drinks 2 cups of tea a day. The patient does not drink coffee.     Immunizations  - The patient has not had the new shingles vaccine.   - She will get the influenza vaccine in early 2023.   - The patient got the pneumonia vaccine in .  - She is encouraged to get the new COVID-19 booster when he becomes available.        HEALTH RISK ASSESSMENT    Recent Hospitalizations:  She was not admitted to the hospital during the last year.       Current Medical Providers:  Patient Care Team:  Sindy Kruger MD as PCP - General  Sindy Kruger MD as PCP - Family Medicine  Bry Allen MD as Consulting Physician (Ophthalmology)    Smoking Status:  Social History     Tobacco Use   Smoking Status Never   Smokeless Tobacco Never       Alcohol Consumption:  Social History     Substance and Sexual Activity   Alcohol Use Yes    Alcohol/week: 1.0 standard drink    Types: 1 Standard drinks or equivalent per week    Comment: monthly       Depression Screen:       2023     9:04 AM   PHQ-2/PHQ-9 Depression Screening   Little Interest or Pleasure in Doing Things 0-->not at all   Feeling Down, Depressed or Hopeless 0-->not at all   PHQ-9: Brief Depression Severity Measure Score 0       Fall Risk Screen:  NICK Fall Risk Assessment was completed, and patient is at LOW risk for falls.Assessment completed on:2023    Health Habits and Functional and Cognitive Screenin/22/2023     9:05 AM   Functional & Cognitive Status   Do you have difficulty preparing food and eating? No   Do you have difficulty bathing yourself, getting dressed or grooming yourself? No   Do you have difficulty using the  toilet? No   Do you have difficulty moving around from place to place? No   Do you have trouble with steps or getting out of a bed or a chair? No   Current Diet Well Balanced Diet   Dental Exam Up to date   Eye Exam Up to date   Exercise (times per week) 2 times per week   Current Exercises Include Walking;Other   Do you need help using the phone?  No   Are you deaf or do you have serious difficulty hearing?  No   Do you need help to go to places out of walking distance? No   Do you need help shopping? No   Do you need help preparing meals?  No   Do you need help with housework?  No   Do you need help with laundry? No   Do you need help taking your medications? No   Do you need help managing money? No   Do you ever drive or ride in a car without wearing a seat belt? No   Have you felt unusual stress, anger or loneliness in the last month? No   Who do you live with? Spouse   If you need help, do you have trouble finding someone available to you? No   Have you been bothered in the last four weeks by sexual problems? No   Do you have difficulty concentrating, remembering or making decisions? No         Age-appropriate Screening Schedule:  Refer to the list below for future screening recommendations based on patient's age, sex and/or medical conditions. Orders for these recommended tests are listed in the plan section. The patient has been provided with a written plan.    Health Maintenance   Topic Date Due    ZOSTER VACCINE (2 of 3) 06/13/2017    COVID-19 Vaccine (5 - Pfizer series) 03/07/2023    INFLUENZA VACCINE  10/01/2023    ANNUAL WELLNESS VISIT  08/22/2024    LIPID PANEL  08/22/2024    DXA SCAN  04/27/2025    TDAP/TD VACCINES (3 - Td or Tdap) 02/17/2032    HEPATITIS C SCREENING  Completed    Pneumococcal Vaccine 65+  Completed    COLORECTAL CANCER SCREENING  Discontinued        The following portions of the patient's history were reviewed and updated as appropriate: allergies, current medications, past family  history, past medical history, past social history, past surgical history, and problem list.    Does the patient have evidence of cognitive impairment? No    Aspirin is on active medication list. Aspirin use is indicated based on review of current medical condition/s. Pros and cons of this therapy have been discussed today. Benefits of this medication outweigh potential harm.  Patient has been encouraged to continue taking this medication.  .      Outpatient Medications Prior to Visit   Medication Sig Dispense Refill    aspirin 81 MG tablet Take 1 tablet by mouth Daily.      Calcium Citrate-Vitamin D 500-500 MG-UNIT chewable tablet 1 po 2 times a day      Proctofoam HC 1-1 % rectal foam APPLY CREAM RECTALLY AS NEEDED 10 g 0    vitamin B-12 (CYANOCOBALAMIN) 500 MCG tablet Take 1 tablet by mouth Daily.      vitamin C (ASCORBIC ACID) 500 MG tablet Take 1 tablet by mouth Daily. Once in a while      lisinopril (PRINIVIL,ZESTRIL) 10 MG tablet TAKE 1 TABLET BY MOUTH ONCE DAILY AT NIGHT 90 tablet 0    metoprolol succinate XL (TOPROL-XL) 100 MG 24 hr tablet TAKE 1/2 (ONE-HALF) TABLET BY MOUTH IN THE MORNING AND 1 IN THE EVENING (Patient taking differently: Take 1 tablet by mouth Every Evening.) 135 tablet 0    pravastatin (PRAVACHOL) 20 MG tablet Take 1 tablet by mouth Every Evening. 90 tablet 1    spironolactone (ALDACTONE) 100 MG tablet Take 1 tablet by mouth Daily. 90 tablet 1    VITAMIN E 400 UNIT capsule Take 1 capsule by mouth Daily.      alendronate (FOSAMAX) 70 MG tablet Take 1 tablet by mouth Every 7 (Seven) Days. (Patient not taking: Reported on 8/22/2023) 15 tablet 1    B Complex Vitamins (VITAMIN B COMPLEX PO) 1 po qd (Patient not taking: Reported on 8/22/2023)      Coenzyme Q10 (CO Q-10) 400 MG capsule 1 capsule every evening (Patient not taking: Reported on 8/22/2023)      magnesium oxide (MAG-OX) 400 MG tablet Take 1 tablet by mouth Daily. (Patient not taking: Reported on 8/22/2023)       No  "facility-administered medications prior to visit.       Patient Active Problem List   Diagnosis    Hemorrhoids    Primary osteoarthritis involving multiple joints    Hypercholesterolemia    Hypertensive retinopathy    Benign essential hypertension    Osteopenia of multiple sites    Menopausal and postmenopausal disorder    Other fatigue    Medicare annual wellness visit, subsequent    Annual physical exam    Fibrocystic disease of left breast    Hair loss    Skin lesion of back    Fall (on) (from) other stairs and steps, initial encounter    Traumatic ecchymosis of left upper arm    At moderate risk for fall    Upper back pain on left side    History of MRSA infection    Easy bruising       Advanced Care Planning:  Advance Directive is not on file.  ACP discussion was held with the patient during this visit. Patient does not have an advance directive, information provided.    Review of Systems    Compared to one year ago, the patient feels her physical health is the same.  Compared to one year ago, the patient feels her mental health is the same.    Reviewed chart for potential of high risk medication in the elderly: yes  Reviewed chart for potential of harmful drug interactions in the elderly:yes    Objective         Vitals:    08/22/23 0906   BP: 130/56   BP Location: Left arm   Patient Position: Sitting   Cuff Size: Adult   Pulse: 60   Temp: 97.3 øF (36.3 øC)   TempSrc: Infrared   SpO2: 94%   Weight: 62.6 kg (138 lb)   Height: 160 cm (62.99\")   PainSc: 0-No pain     BMI is within normal parameters. No other follow-up for BMI required.    Body mass index is 24.45 kg/mý.  Discussed the patient's BMI with her. The BMI is in the acceptable range.    Physical Exam  Vitals and nursing note reviewed.   Constitutional:       Appearance: She is well-developed.   Eyes:      Conjunctiva/sclera: Conjunctivae normal.      Pupils: Pupils are equal, round, and reactive to light.   Neck:      Thyroid: No thyromegaly. "   Cardiovascular:      Rate and Rhythm: Normal rate and regular rhythm.      Heart sounds: Normal heart sounds. No murmur heard.  Pulmonary:      Effort: Pulmonary effort is normal.      Breath sounds: Normal breath sounds. No wheezing.   Chest:   Breasts:     Right: No inverted nipple, mass, nipple discharge, skin change or tenderness.      Left: No inverted nipple, mass, nipple discharge, skin change or tenderness.   Abdominal:      General: Bowel sounds are normal. There is no distension.      Palpations: Abdomen is soft. There is no mass.      Tenderness: There is no abdominal tenderness.   Musculoskeletal:         General: No tenderness. Normal range of motion.      Cervical back: Normal range of motion and neck supple.   Lymphadenopathy:      Cervical: No cervical adenopathy.   Skin:     General: Skin is warm and dry.      Findings: No rash.      Comments: There are traumatic bruises on the shins that are almost resolved and some new ones on her left arm from her dog.   Neurological:      Mental Status: She is alert and oriented to person, place, and time.      Cranial Nerves: No cranial nerve deficit.      Sensory: No sensory deficit.      Coordination: Coordination normal.      Gait: Gait normal.   Psychiatric:         Speech: Speech normal.         Behavior: Behavior normal.         Thought Content: Thought content normal.         Judgment: Judgment normal.         ECG 12 Lead    Date/Time: 8/22/2023 9:50 AM  Performed by: Sindy Kruger MD  Authorized by: Sindy Kruger MD   Comparison: compared with previous ECG   Similar to previous ECG  Rhythm: sinus rhythm  Rate: normal  Conduction: 1st degree AV block  ST Segments: ST segments normal  T Waves: T waves normal  QRS axis: normal    Clinical impression: abnormal EKG        Lab Results   Component Value Date    TRIG 72 08/22/2023    HDL 51 08/22/2023    LDL 80 08/22/2023    VLDL 14 08/22/2023        Assessment & Plan   Medicare Risks and  Personalized Health Plan  CMS Preventative Services Quick Reference  Cardiovascular risk  Osteoporosis Risk    The above risks/problems have been discussed with the patient.  Pertinent information has been shared with the patient in the After Visit Summary.  Follow up plans and orders are seen below in the Assessment/Plan Section.  Patient Instructions   Problem List Items Addressed This Visit          Cardiac and Vasculature    Hypercholesterolemia    Overview     Taking pravastatin every evening.         Current Assessment & Plan     Continue pravastatin every evening. Continue low-fat diet and regular exercise.         Relevant Medications    pravastatin (PRAVACHOL) 20 MG tablet    Other Relevant Orders    Lipid Panel (Completed)    TSH (Completed)    Benign essential hypertension    Overview     Taking spironolactone 100 mg tablet every morning.  Take lisinopril 10 mg every evening.  Taking metoprolol XL one of the 100 mg tablet every evening.         Current Assessment & Plan     We will continue spironolactone, lisinopril, and metoprolol. She will continue to avoid salt in the diet.         Relevant Medications    spironolactone (ALDACTONE) 100 MG tablet    lisinopril (PRINIVIL,ZESTRIL) 10 MG tablet    metoprolol succinate XL (TOPROL-XL) 100 MG 24 hr tablet    Other Relevant Orders    CBC & Differential (Completed)    Comprehensive Metabolic Panel (Completed)    Microalbumin / Creatinine Urine Ratio - Urine, Clean Catch    Urinalysis With Microscopic - Urine, Clean Catch    ECG 12 Lead       Eye    Hypertensive retinopathy    Overview     Regular follow up with Dr. Bry Allen.         Current Assessment & Plan     She will take coated aspirin 2 or 3 days a week. She will follow up with Dr. Bry Allen.            Health Encounters    Medicare annual wellness visit, subsequent - Primary    Current Assessment & Plan     She is up to date on immunizations except for the new shingles vaccine, which she will get at  her pharmacy. She is up to date on colonoscopy, DEXA, and mammogram.         Annual physical exam    Current Assessment & Plan     She is up to date on immunizations except for the new shingles vaccine, which she will get at her pharmacy. She is up to date on colonoscopy, DEXA, and mammogram.            Musculoskeletal and Injuries    Primary osteoarthritis involving multiple joints    Current Assessment & Plan     She will continue to be physically active. She may take Tylenol as needed.         Osteopenia of multiple sites    Overview     DEXA 1/2021 showed some mild improvement in osteopenia T-scores of the hip.  The lowest T score is -1.8 in the femoral neck.  Spine T-scores decreased slightly but is still very mild osteopenia at -1.1.  DEXA 4/27/23 DEXA 4/2023 shows decline in bone density with the lowest T score in the lumbar spine at -1.4 and the lowest T score in the femoral neck at -2.2.     I recommended starting Fosamax (alendronate) tablet but she has not started it yet due to concern over side effects.             Current Assessment & Plan     We discussed possible side effects of Fosamax and the fact that the benefits of preventing fractures far outweighs the possible rare side effects. She will start taking it once a week. She will continue calcium and vitamin D3. She will continue regular weightbearing exercises with walking and gardening and going to the strength training classes. She could also use some small hand weights at home while watching TV this winter.         Relevant Medications    alendronate (FOSAMAX) 70 MG tablet    Other Relevant Orders    Vitamin D,25-Hydroxy (Completed)       Skin    Easy bruising (Chronic)    Current Assessment & Plan     We will check labs today. We will decrease her 81 mg aspirin to just 2 or 3 days a week. She will stop taking vitamin E.            Other    RESOLVED: Overweight with body mass index (BMI) of 25 to 25.9 in adult (Chronic)        Follow Up:  Next  "Medicare Wellness visit to be scheduled in 1 year.    Return in about 6 months (around 2/22/2024) for recheck fasting, labs today.    An After Visit Summary and PPPS were given to the patient.       Additional E&M Note during same encounter follows:  Patient has multiple medical problems which are significant and separately identifiable that require additional work above and beyond the Medicare Wellness Visit.      Chief Complaint  Medicare Wellness-subsequent and Hypertension (Fasting)    Subjective        Payal Nix is also being seen today for osteopenia worsening, easy bruising    Osteopenia  The patient had a bone density scan in 2021, and she was in the osteopenia range. She had a repeat bone density scan in 04/2023, and it had gone down. The patient was told to take Fosamax, but she is not taking it because she read about the side effects. She will start taking it once a week by itself with water and then wait 30 minutes before she takes her other pills. The patient is not taking CoQ10. She is taking B12 500 mg, vitamin C, vitamin E 400 international units twice per day, vitamin D3 1000 international units, and calcium 1000 international units. The patient will discontinue vitamin E.     Bruising  The patient has a lot of bruising on her legs. She takes a baby aspirin every day. The patient will cut back the baby aspirin to every 3rd day. The bruising is from her dog jumping on her.      Objective   Vital Signs:  /56 (BP Location: Left arm, Patient Position: Sitting, Cuff Size: Adult)   Pulse 60   Temp 97.3 øF (36.3 øC) (Infrared)   Ht 160 cm (62.99\")   Wt 62.6 kg (138 lb)   SpO2 94%   BMI 24.45 kg/mý     The following data was reviewed by: Sindy Kruger MD on 08/22/2023:  CMP          9/2/2022    14:25 2/22/2023    10:23 8/22/2023    10:19   CMP   Glucose 90  97  99    BUN 19  19  25    Creatinine 1.08  1.09  1.37    EGFR 53.3  52.4  39.9    Sodium 132  140  134    Potassium 4.7  4.4  5.4  "   Chloride 101  105  101    Calcium 10.1  10.2  10.8    Total Protein  7.1  7.3    Albumin  4.6  4.7    Globulin  2.5  2.6    Total Bilirubin  0.7  0.6    Alkaline Phosphatase  63  62    AST (SGOT)  13  17    ALT (SGPT)  11  12    Albumin/Globulin Ratio  1.8  1.8    BUN/Creatinine Ratio 17.6  17.4  18.2    Anion Gap 6.0  8.1  10.3      CBC          8/22/2023    10:19   CBC   WBC 6.97    RBC 4.03    Hemoglobin 12.1    Hematocrit 36.1    MCV 89.6    MCH 30.0    MCHC 33.5    RDW 12.1    Platelets 269      CBC w/diff          8/22/2023    10:19   CBC w/Diff   WBC 6.97    RBC 4.03    Hemoglobin 12.1    Hematocrit 36.1    MCV 89.6    MCH 30.0    MCHC 33.5    RDW 12.1    Platelets 269    Neutrophil Rel % 77.6    Immature Granulocyte Rel % 0.4    Lymphocyte Rel % 12.3    Monocyte Rel % 7.2    Eosinophil Rel % 1.9    Basophil Rel % 0.6      Lipid Panel          2/22/2023    10:23 8/22/2023    10:19   Lipid Panel   Total Cholesterol 156  145    Triglycerides 85  72    HDL Cholesterol 55  51    VLDL Cholesterol 16  14    LDL Cholesterol  85  80    LDL/HDL Ratio 1.53  1.56      TSH          8/22/2023    10:19   TSH   TSH 2.850        Data reviewed : Radiologic studies DEXA       Assessment and Plan   Diagnoses and all orders for this visit:    1. Medicare annual wellness visit, subsequent (Primary)  Assessment & Plan:  She is up to date on immunizations except for the new shingles vaccine, which she will get at her pharmacy. She is up to date on colonoscopy, DEXA, and mammogram.      2. Annual physical exam  Assessment & Plan:  She is up to date on immunizations except for the new shingles vaccine, which she will get at her pharmacy. She is up to date on colonoscopy, DEXA, and mammogram.      3. Benign essential hypertension  Assessment & Plan:  We will continue spironolactone, lisinopril, and metoprolol. She will continue to avoid salt in the diet.    Orders:  -     CBC & Differential; Future  -     Comprehensive Metabolic  Panel; Future  -     Microalbumin / Creatinine Urine Ratio - Urine, Clean Catch; Future  -     Urinalysis With Microscopic - Urine, Clean Catch; Future  -     spironolactone (ALDACTONE) 100 MG tablet; Take 1 tablet by mouth Daily.  Dispense: 90 tablet; Refill: 1  -     lisinopril (PRINIVIL,ZESTRIL) 10 MG tablet; Take 1 tablet by mouth Every Night.  Dispense: 90 tablet; Refill: 1  -     metoprolol succinate XL (TOPROL-XL) 100 MG 24 hr tablet; Take 1 tablet by mouth Every Evening.  Dispense: 90 tablet; Refill: 1  -     ECG 12 Lead    4. Easy bruising  Assessment & Plan:  We will check labs today. We will decrease her 81 mg aspirin to just 2 or 3 days a week. She will stop taking vitamin E.      5. Hypercholesterolemia  Assessment & Plan:  Continue pravastatin every evening. Continue low-fat diet and regular exercise.    Orders:  -     Lipid Panel; Future  -     TSH; Future  -     pravastatin (PRAVACHOL) 20 MG tablet; Take 1 tablet by mouth Every Evening.  Dispense: 90 tablet; Refill: 1    6. Hypertensive retinopathy of both eyes  Assessment & Plan:  She will take coated aspirin 2 or 3 days a week. She will follow up with Dr. Bry Allen.      7. Osteopenia of multiple sites  Assessment & Plan:  We discussed possible side effects of Fosamax and the fact that the benefits of preventing fractures far outweighs the possible rare side effects. She will start taking it once a week. She will continue calcium and vitamin D3. She will continue regular weightbearing exercises with walking and gardening and going to the strength training classes. She could also use some small hand weights at home while watching TV this winter.    Orders:  -     Vitamin D,25-Hydroxy; Future    8. Overweight with body mass index (BMI) of 25 to 25.9 in adult    9. Primary osteoarthritis involving multiple joints  Assessment & Plan:  She will continue to be physically active. She may take Tylenol as needed.               Follow Up   Return in about 6  months (around 2/22/2024) for recheck fasting, labs today.  Patient was given instructions and counseling regarding her condition or for health maintenance advice. Please see specific information pulled into the AVS if appropriate.     Transcribed from ambient dictation for Sindy Kruger MD by Sahra Bearden.  08/22/23   11:25 EDT    Patient or patient representative verbalized consent to the visit recording.  I have personally performed the services described in this document as transcribed by the above individual, and it is both accurate and complete.

## 2023-12-29 DIAGNOSIS — I10 BENIGN ESSENTIAL HYPERTENSION: ICD-10-CM

## 2023-12-30 RX ORDER — METOPROLOL SUCCINATE 100 MG/1
TABLET, EXTENDED RELEASE ORAL
Qty: 135 TABLET | Refills: 0 | Status: SHIPPED | OUTPATIENT
Start: 2023-12-30

## 2024-02-21 DIAGNOSIS — I10 BENIGN ESSENTIAL HYPERTENSION: ICD-10-CM

## 2024-02-21 RX ORDER — LISINOPRIL 10 MG/1
10 TABLET ORAL NIGHTLY
Qty: 90 TABLET | Refills: 0 | Status: SHIPPED | OUTPATIENT
Start: 2024-02-21

## 2024-02-21 NOTE — TELEPHONE ENCOUNTER
Rx Refill Note  Requested Prescriptions     Pending Prescriptions Disp Refills    lisinopril (PRINIVIL,ZESTRIL) 10 MG tablet [Pharmacy Med Name: Lisinopril 10 MG Oral Tablet] 90 tablet 0     Sig: TAKE 1 TABLET BY MOUTH ONCE DAILY AT NIGHT      Last office visit with prescribing clinician: 8/22/2023   Last telemedicine visit with prescribing clinician: Visit date not found   Next office visit with prescribing clinician: 3/1/2024                         Would you like a call back once the refill request has been completed: [] Yes [] No    If the office needs to give you a call back, can they leave a voicemail: [] Yes [] No    Winnie Florian LPN  02/21/24, 12:35 EST

## 2024-03-01 ENCOUNTER — OFFICE VISIT (OUTPATIENT)
Dept: INTERNAL MEDICINE | Facility: CLINIC | Age: 79
End: 2024-03-01
Payer: MEDICARE

## 2024-03-01 VITALS
WEIGHT: 137 LBS | HEART RATE: 62 BPM | HEIGHT: 63 IN | BODY MASS INDEX: 24.27 KG/M2 | TEMPERATURE: 97.5 F | OXYGEN SATURATION: 98 % | DIASTOLIC BLOOD PRESSURE: 58 MMHG | SYSTOLIC BLOOD PRESSURE: 124 MMHG

## 2024-03-01 DIAGNOSIS — I10 BENIGN ESSENTIAL HYPERTENSION: Primary | ICD-10-CM

## 2024-03-01 DIAGNOSIS — E78.00 HYPERCHOLESTEROLEMIA: ICD-10-CM

## 2024-03-01 DIAGNOSIS — E55.9 VITAMIN D DEFICIENCY: ICD-10-CM

## 2024-03-01 DIAGNOSIS — M85.89 OSTEOPENIA OF MULTIPLE SITES: ICD-10-CM

## 2024-03-01 DIAGNOSIS — J34.89 RHINORRHEA: Chronic | ICD-10-CM

## 2024-03-01 PROCEDURE — 99214 OFFICE O/P EST MOD 30 MIN: CPT | Performed by: INTERNAL MEDICINE

## 2024-03-01 PROCEDURE — 1159F MED LIST DOCD IN RCRD: CPT | Performed by: INTERNAL MEDICINE

## 2024-03-01 PROCEDURE — 1160F RVW MEDS BY RX/DR IN RCRD: CPT | Performed by: INTERNAL MEDICINE

## 2024-03-01 PROCEDURE — 3074F SYST BP LT 130 MM HG: CPT | Performed by: INTERNAL MEDICINE

## 2024-03-01 PROCEDURE — 3078F DIAST BP <80 MM HG: CPT | Performed by: INTERNAL MEDICINE

## 2024-03-01 RX ORDER — AZELASTINE 1 MG/ML
2 SPRAY, METERED NASAL 2 TIMES DAILY
Qty: 1 EACH | Refills: 12 | Status: SHIPPED | OUTPATIENT
Start: 2024-03-01

## 2024-03-01 RX ORDER — METOPROLOL SUCCINATE 100 MG/1
100 TABLET, EXTENDED RELEASE ORAL NIGHTLY
Qty: 90 TABLET | Refills: 1 | Status: SHIPPED | OUTPATIENT
Start: 2024-03-01

## 2024-03-01 RX ORDER — IPRATROPIUM BROMIDE 42 UG/1
2 SPRAY, METERED NASAL 3 TIMES DAILY
Qty: 1 EACH | Refills: 5 | Status: SHIPPED | OUTPATIENT
Start: 2024-03-01

## 2024-03-01 RX ORDER — ALENDRONATE SODIUM 70 MG/1
70 TABLET ORAL
Qty: 15 TABLET | Refills: 1 | Status: SHIPPED | OUTPATIENT
Start: 2024-03-01

## 2024-03-01 RX ORDER — LISINOPRIL 10 MG/1
10 TABLET ORAL NIGHTLY
Qty: 90 TABLET | Refills: 1 | Status: SHIPPED | OUTPATIENT
Start: 2024-03-01

## 2024-03-01 RX ORDER — PRAVASTATIN SODIUM 20 MG
20 TABLET ORAL EVERY EVENING
Qty: 90 TABLET | Refills: 1 | Status: SHIPPED | OUTPATIENT
Start: 2024-03-01

## 2024-03-01 NOTE — PROGRESS NOTES
[facility]     Payal Nix  1945   [id number]      Patient Care Team:  Sindy Kruger MD as PCP - General  Sindy Kruger MD as PCP - Family Medicine  Bry Allen MD as Consulting Physician (Ophthalmology)    Chief Complaint   Patient presents with    Hypertension     6 mo f/u    Allergic Rhinitis     Pt reports having a runny nose for many months            HPI  Patient is a 79 y.o. female presents for a 6-month follow-up.    Rhinorrhea  She has been experiencing rhinorrhea for about 6 months producing clear drainage. It is not constant, as it will stop and suddenly start again. Her nose is sore due to constantly wiping her nose. It can occur in either nostril. She denies irritation inside the nostril or drainage in the back of her throat. She does not feel stuffed up. She denies pain or pressure. She is in the same house as always. She denies new carpet or drapes. She has no new pets. The patient has not changed any medications.  She denies ear symptoms. It is not triggered by going from one spot to another. She has not noticed a difference in her diet. She does not feel sick, but it is just irritating. She does sneeze occasionally, but not every day. Sometimes she will sneeze 10 times in a row. She changes her furnace filters all the time. She has not taken any over-the-counter medications. She wonders if it could be a side effect of the COVID-19 which she had contracted before the vaccine was available. She conveys that she never had chest congestion with Covid-19, but she experienced body aches.    Hypertension  Her blood pressure today 03/01/2024, is 124/58 mmHg. At home, the patient notes that the diastolic reading was  63 mmHg. At the dentist it was 99/40 mmHg; however, they used a wrist cuff. She denies lightheadedness or dizziness. She continues to take metoprolol 100 mg once a day and lisinopril 10 mg in the morning. Spironolactone was  discontinued.    Hypercholesterolemia.  She is taking pravastatin every evening.     Osteopenia.  She is taking alendronate once a week for bone density. She denies stomach upset with the alendronate. She does a strength training class once a week. She does her stretching exercises at home[place] She does [place] weights.    Health maintenance  She is taking baby aspirin every other day. Her bruising has improved since she decreased the baby aspirin to every other day. The patient is taking a strength training class which she attends twice a week. She stretches at home.      CHRONIC CONDITIONS  Hypertension  Hypercholesterolemia  Osteopenia        Past Medical History:   Diagnosis Date    Arthritis     Cellulitis 12/28/2022    Cervical cancer     Dr. Orr    Cervical cancer     in situ; Hysterectomy 1982    Chronic right-sided thoracic back pain 01/24/2019    Fatigue 01/24/2019    Fibrocystic breast     Hyperlipidemia     Hypertension     Menopausal state     Osteopenia     Overweight with body mass index (BMI) of 25 to 25.9 in adult 02/04/2021       Past Surgical History:   Procedure Laterality Date    BREAST CYST ASPIRATION      CATARACT EXTRACTION, BILATERAL Bilateral     2021    D & C WITH CERVICAL CONIZATION  1981    HYSTERECTOMY  1982    Ovaries intact. cervical cancer in situ       Family History   Problem Relation Age of Onset    Stroke Mother     Arthritis Mother     Tuberculosis Maternal Aunt     Tuberculosis Maternal Grandmother     Breast cancer Neg Hx     Endometrial cancer Neg Hx     Ovarian cancer Neg Hx        Social History     Socioeconomic History    Marital status:    Tobacco Use    Smoking status: Never    Smokeless tobacco: Never   Substance and Sexual Activity    Alcohol use: Yes     Alcohol/week: 1.0 standard drink of alcohol     Types: 1 Standard drinks or equivalent per week     Comment: monthly    Drug use: No    Sexual activity: Yes     Partners: Male     Birth  "control/protection: Post-menopausal       Allergies   Allergen Reactions    Amlodipine Besylate Other (See Comments)     edema  Other reaction(s): edema    Benazepril Hcl Swelling    Ciprofloxacin Other (See Comments)     Nausea and chills    Ciprofloxacin Hcl Other (See Comments)     Nausea and chills    Levofloxacin Nausea And Vomiting     Other reaction(s): nausea, vomiting    Lotrel [Amlodipine Besy-Benazepril Hcl] Swelling     unknown       Review of Systems:     Review of Systems  The appropriate review of systems is included in the HPI.    Vital Signs  Vitals:    03/01/24 1350   BP: 124/58   BP Location: Left arm   Patient Position: Sitting   Cuff Size: Adult   Pulse: 62   Temp: 97.5 °F (36.4 °C)   TempSrc: Infrared   SpO2: 98%   Weight: 62.1 kg (137 lb)   Height: 160 cm (62.99\")     Body mass index is 24.27 kg/m².  BMI is within normal parameters. No other follow-up for BMI required.        Current Outpatient Medications:     alendronate (FOSAMAX) 70 MG tablet, Take 1 tablet by mouth Every 7 (Seven) Days., Disp: 15 tablet, Rfl: 1    aspirin 81 MG tablet, Take 1 tablet by mouth Daily., Disp: , Rfl:     Calcium Citrate-Vitamin D 500-500 MG-UNIT chewable tablet, 1 po 2 times a day, Disp: , Rfl:     lisinopril (PRINIVIL,ZESTRIL) 10 MG tablet, Take 1 tablet by mouth Every Night., Disp: 90 tablet, Rfl: 1    metoprolol succinate XL (TOPROL-XL) 100 MG 24 hr tablet, Take 1 tablet by mouth Every Night., Disp: 90 tablet, Rfl: 1    pravastatin (PRAVACHOL) 20 MG tablet, Take 1 tablet by mouth Every Evening., Disp: 90 tablet, Rfl: 1    Proctofoam HC 1-1 % rectal foam, APPLY CREAM RECTALLY AS NEEDED, Disp: 10 g, Rfl: 0    vitamin B-12 (CYANOCOBALAMIN) 500 MCG tablet, Take 1 tablet by mouth Daily., Disp: , Rfl:     vitamin C (ASCORBIC ACID) 500 MG tablet, Take 1 tablet by mouth Daily. Once in a while, Disp: , Rfl:     azelastine (ASTELIN) 0.1 % nasal spray, 2 sprays into the nostril(s) as directed by provider 2 (Two) Times " "a Day. Use in each nostril as directed, Disp: 1 each, Rfl: 12    ipratropium (ATROVENT) 0.06 % nasal spray, 2 sprays into the nostril(s) as directed by provider 3 (Three) Times a Day., Disp: 1 each, Rfl: 5    Physical Exam:    Physical Exam  Vitals and nursing note reviewed.   Constitutional:       Appearance: She is well-developed.   HENT:      Head: Normocephalic.   Eyes:      Conjunctiva/sclera: Conjunctivae normal.      Pupils: Pupils are equal, round, and reactive to light.   Neck:      Thyroid: No thyromegaly.   Cardiovascular:      Rate and Rhythm: Normal rate and regular rhythm.      Heart sounds: Normal heart sounds.   Pulmonary:      Effort: Pulmonary effort is normal.      Breath sounds: Normal breath sounds. No wheezing.   Musculoskeletal:         General: Normal range of motion.      Cervical back: Normal range of motion and neck supple.   Lymphadenopathy:      Cervical: No cervical adenopathy.   Skin:     General: Skin is warm and dry.   Neurological:      Mental Status: She is alert and oriented to person, place, and time.   Psychiatric:         Thought Content: Thought content normal.     Erythema, congestion, and swelling in bilateral nares, left worse than right.   Bilateral TMs both normal.   Bilateral ear canals normal.   Posterior oropharynx normal.     ACE III MINI        Results Review:    None    CMP:  Lab Results   Component Value Date    BUN 25 (H) 08/22/2023    CREATININE 1.37 (H) 08/22/2023    EGFRIFNONA 67 02/21/2022    BCR 18.2 08/22/2023     (L) 08/22/2023    K 5.4 (H) 08/22/2023    CO2 22.7 08/22/2023    CALCIUM 10.8 (H) 08/22/2023    ALBUMIN 4.7 08/22/2023    BILITOT 0.6 08/22/2023    ALKPHOS 62 08/22/2023    AST 17 08/22/2023    ALT 12 08/22/2023     HbA1c:  No results found for: \"HGBA1C\"  Microalbumin:  Lab Results   Component Value Date    MICROALBUR <1.2 08/22/2023     Lipid Panel  Lab Results   Component Value Date    CHOL 145 08/22/2023    TRIG 72 08/22/2023    HDL 51 " 08/22/2023    LDL 80 08/22/2023    AST 17 08/22/2023    ALT 12 08/22/2023       Medication Review: Medications reviewed and noted  Patient Instructions   Problem List Items Addressed This Visit          Cardiac and Vasculature    Hypercholesterolemia    Overview     Taking pravastatin every evening.         Relevant Medications    pravastatin (PRAVACHOL) 20 MG tablet    Other Relevant Orders    CBC & Differential    Comprehensive Metabolic Panel    Microalbumin / Creatinine Urine Ratio - Urine, Clean Catch    Urinalysis With Microscopic - Urine, Clean Catch    Benign essential hypertension - Primary    Overview     Taking lisinopril 10 mg every evening.  Taking metoprolol XL one of the 100 mg tablet every evening.         Current Assessment & Plan              Relevant Medications    metoprolol succinate XL (TOPROL-XL) 100 MG 24 hr tablet    lisinopril (PRINIVIL,ZESTRIL) 10 MG tablet       Infectious Diseases    Rhinorrhea (Chronic)       Musculoskeletal and Injuries    Osteopenia of multiple sites    Overview     DEXA 1/2021 showed some mild improvement in osteopenia T-scores of the hip.  The lowest T score is -1.8 in the femoral neck.  Spine T-scores decreased slightly but is still very mild osteopenia at -1.1.  DEXA 4/27/23 DEXA 4/2023 shows decline in bone density with the lowest T score in the lumbar spine at -1.4 and the lowest T score in the femoral neck at -2.2.     Taking Fosamax (alendronate) tablet weekly.         Relevant Medications    alendronate (FOSAMAX) 70 MG tablet     Other Visit Diagnoses       Vitamin D deficiency                   Diagnosis Plan   1. Benign essential hypertension  metoprolol succinate XL (TOPROL-XL) 100 MG 24 hr tablet    lisinopril (PRINIVIL,ZESTRIL) 10 MG tablet      2. Hypercholesterolemia  CBC & Differential    Comprehensive Metabolic Panel    Microalbumin / Creatinine Urine Ratio - Urine, Clean Catch    Urinalysis With Microscopic - Urine, Clean Catch    pravastatin  (PRAVACHOL) 20 MG tablet      3. Osteopenia of multiple sites  alendronate (FOSAMAX) 70 MG tablet      4. Vitamin D deficiency        5. Rhinorrhea        1. Rhinorrhea.  This is most likely an allergy. It could possibly be vasomotor rhinitis, but it seems to be more random and not connected with going from outside to inside or vice versa or with eating. She will use azelastine nasal spray 2 sprays in each nostril twice a day and ipratropium nasal spray 2 sprays in each nostril 3 times a day. If she is doing well in a couple of weeks, she may decrease the ipratropium spray to twice a day and eventually to once a day if her symptoms continue to do well.    2. Osteopenia.  She will continue taking alendronate once a week. She will continue regular weightbearing exercises with her strength training class and physical activities and ambulating at home. I also recommend using small hand weights at home 5 to 10 minutes a day while watching TV. She will continue taking vitamin D3. I will recheck DEXA scan in 04/2025.    3. Hypertension.  She is doing well off the spironolactone and just taking 100 mg of the metoprolol once a day. She is also taking lisinopril once a day. Her blood pressure is now doing well without going too low. She will continue current doses and a low-salt diet.    4. Hypercholesterolemia.  She will continue pravastatin every evening. She will continue a low-fat diet and regular exercise.    Follow-up  The patient will follow up in 6 months for annual wellness.          Plan of care reviewed with patient at the conclusion of today's visit. Education was provided regarding diagnosis, management, and any prescribed or recommended OTC medications.Patient verbalizes understanding of and agreement with management plan.         Sindy Kruger MD    Transcribed from ambient dictation for Sindy Kruger MD by Latrice Leonard.  03/01/24   16:25 EST    Patient or patient representative verbalized consent  to the visit recording.  I have personally performed the services described in this document as transcribed by the above individual, and it is both accurate and complete.

## 2024-03-04 ENCOUNTER — LAB (OUTPATIENT)
Dept: LAB | Facility: HOSPITAL | Age: 79
End: 2024-03-04
Payer: MEDICARE

## 2024-03-04 DIAGNOSIS — E78.00 HYPERCHOLESTEROLEMIA: ICD-10-CM

## 2024-03-04 LAB
ALBUMIN SERPL-MCNC: 4.3 G/DL (ref 3.5–5.2)
ALBUMIN UR-MCNC: 1.2 MG/DL
ALBUMIN/GLOB SERPL: 1.8 G/DL
ALP SERPL-CCNC: 51 U/L (ref 39–117)
ALT SERPL W P-5'-P-CCNC: 12 U/L (ref 1–33)
ANION GAP SERPL CALCULATED.3IONS-SCNC: 11.4 MMOL/L (ref 5–15)
AST SERPL-CCNC: 15 U/L (ref 1–32)
BASOPHILS # BLD AUTO: 0.04 10*3/MM3 (ref 0–0.2)
BASOPHILS NFR BLD AUTO: 0.7 % (ref 0–1.5)
BILIRUB SERPL-MCNC: 0.6 MG/DL (ref 0–1.2)
BUN SERPL-MCNC: 20 MG/DL (ref 8–23)
BUN/CREAT SERPL: 16.9 (ref 7–25)
CALCIUM SPEC-SCNC: 10 MG/DL (ref 8.6–10.5)
CHLORIDE SERPL-SCNC: 105 MMOL/L (ref 98–107)
CO2 SERPL-SCNC: 23.6 MMOL/L (ref 22–29)
CREAT SERPL-MCNC: 1.18 MG/DL (ref 0.57–1)
CREAT UR-MCNC: 88.1 MG/DL
DEPRECATED RDW RBC AUTO: 39.6 FL (ref 37–54)
EGFRCR SERPLBLD CKD-EPI 2021: 47.4 ML/MIN/1.73
EOSINOPHIL # BLD AUTO: 0.15 10*3/MM3 (ref 0–0.4)
EOSINOPHIL NFR BLD AUTO: 2.5 % (ref 0.3–6.2)
ERYTHROCYTE [DISTWIDTH] IN BLOOD BY AUTOMATED COUNT: 12.3 % (ref 12.3–15.4)
GLOBULIN UR ELPH-MCNC: 2.4 GM/DL
GLUCOSE SERPL-MCNC: 83 MG/DL (ref 65–99)
HCT VFR BLD AUTO: 35.1 % (ref 34–46.6)
HGB BLD-MCNC: 11.4 G/DL (ref 12–15.9)
IMM GRANULOCYTES # BLD AUTO: 0.02 10*3/MM3 (ref 0–0.05)
IMM GRANULOCYTES NFR BLD AUTO: 0.3 % (ref 0–0.5)
LYMPHOCYTES # BLD AUTO: 1.17 10*3/MM3 (ref 0.7–3.1)
LYMPHOCYTES NFR BLD AUTO: 19.2 % (ref 19.6–45.3)
MCH RBC QN AUTO: 29.2 PG (ref 26.6–33)
MCHC RBC AUTO-ENTMCNC: 32.5 G/DL (ref 31.5–35.7)
MCV RBC AUTO: 89.8 FL (ref 79–97)
MICROALBUMIN/CREAT UR: 13.6 MG/G (ref 0–29)
MONOCYTES # BLD AUTO: 0.49 10*3/MM3 (ref 0.1–0.9)
MONOCYTES NFR BLD AUTO: 8 % (ref 5–12)
NEUTROPHILS NFR BLD AUTO: 4.22 10*3/MM3 (ref 1.7–7)
NEUTROPHILS NFR BLD AUTO: 69.3 % (ref 42.7–76)
NRBC BLD AUTO-RTO: 0 /100 WBC (ref 0–0.2)
PLATELET # BLD AUTO: 256 10*3/MM3 (ref 140–450)
PMV BLD AUTO: 9.7 FL (ref 6–12)
POTASSIUM SERPL-SCNC: 4.8 MMOL/L (ref 3.5–5.2)
PROT SERPL-MCNC: 6.7 G/DL (ref 6–8.5)
RBC # BLD AUTO: 3.91 10*6/MM3 (ref 3.77–5.28)
SODIUM SERPL-SCNC: 140 MMOL/L (ref 136–145)
WBC NRBC COR # BLD AUTO: 6.09 10*3/MM3 (ref 3.4–10.8)

## 2024-03-04 PROCEDURE — 80053 COMPREHEN METABOLIC PANEL: CPT

## 2024-03-04 PROCEDURE — 85025 COMPLETE CBC W/AUTO DIFF WBC: CPT

## 2024-03-04 PROCEDURE — 81001 URINALYSIS AUTO W/SCOPE: CPT

## 2024-03-04 PROCEDURE — 82570 ASSAY OF URINE CREATININE: CPT

## 2024-03-04 PROCEDURE — 82043 UR ALBUMIN QUANTITATIVE: CPT

## 2024-03-05 ENCOUNTER — TELEPHONE (OUTPATIENT)
Dept: INTERNAL MEDICINE | Facility: CLINIC | Age: 79
End: 2024-03-05
Payer: MEDICARE

## 2024-03-05 LAB
BACTERIA UR QL AUTO: ABNORMAL /HPF
BILIRUB UR QL STRIP: NEGATIVE
CLARITY UR: ABNORMAL
COLOR UR: YELLOW
GLUCOSE UR STRIP-MCNC: NEGATIVE MG/DL
HGB UR QL STRIP.AUTO: NEGATIVE
HYALINE CASTS UR QL AUTO: ABNORMAL /LPF
KETONES UR QL STRIP: NEGATIVE
LEUKOCYTE ESTERASE UR QL STRIP.AUTO: ABNORMAL
NITRITE UR QL STRIP: NEGATIVE
PH UR STRIP.AUTO: 6 [PH] (ref 5–8)
PROT UR QL STRIP: NEGATIVE
RBC # UR STRIP: ABNORMAL /HPF
REF LAB TEST METHOD: ABNORMAL
SP GR UR STRIP: 1.01 (ref 1–1.03)
SQUAMOUS #/AREA URNS HPF: ABNORMAL /HPF
UROBILINOGEN UR QL STRIP: ABNORMAL
WBC # UR STRIP: ABNORMAL /HPF

## 2024-03-05 NOTE — PATIENT INSTRUCTIONS
1. Rhinorrhea.  This is most likely an allergy. It could possibly be vasomotor rhinitis, but it seems to be more random and not connected with going from outside to inside or vice versa or with eating. She will use azelastine nasal spray 2 sprays in each nostril twice a day and ipratropium nasal spray 2 sprays in each nostril 3 times a day. If she is doing well in a couple of weeks, she may decrease the ipratropium spray to twice a day and eventually to once a day if her symptoms continue to do well.    2. Osteopenia.  She will continue taking alendronate once a week. She will continue regular weightbearing exercises with her strength training class and physical activities and ambulating at home. I also recommend using small hand weights at home 5 to 10 minutes a day while watching TV. She will continue taking vitamin D3. I will recheck DEXA scan in 04/2025.    3. Hypertension.  She is doing well off the spironolactone and just taking 100 mg of the metoprolol once a day. She is also taking lisinopril once a day. Her blood pressure is now doing well without going too low. She will continue current doses and a low-salt diet.    4. Hypercholesterolemia.  She will continue pravastatin every evening. She will continue a low-fat diet and regular exercise.    Follow-up  The patient will follow up in 6 months for annual wellness.  Patient Instructions  Problem List Items Addressed This Visit          Cardiac and Vasculature    Hypercholesterolemia    Overview     Taking pravastatin every evening.         Relevant Medications    pravastatin (PRAVACHOL) 20 MG tablet    Other Relevant Orders    CBC & Differential    Comprehensive Metabolic Panel    Microalbumin / Creatinine Urine Ratio - Urine, Clean Catch    Urinalysis With Microscopic - Urine, Clean Catch    Benign essential hypertension - Primary    Overview     Take lisinopril 10 mg every evening.  Taking metoprolol XL one of the 100 mg tablet every evening.          Relevant Medications    metoprolol succinate XL (TOPROL-XL) 100 MG 24 hr tablet    lisinopril (PRINIVIL,ZESTRIL) 10 MG tablet       Infectious Diseases    Rhinorrhea (Chronic)       Musculoskeletal and Injuries    Osteopenia of multiple sites    Overview     DEXA 1/2021 showed some mild improvement in osteopenia T-scores of the hip.  The lowest T score is -1.8 in the femoral neck.  Spine T-scores decreased slightly but is still very mild osteopenia at -1.1.  DEXA 4/27/23 DEXA 4/2023 shows decline in bone density with the lowest T score in the lumbar spine at -1.4 and the lowest T score in the femoral neck at -2.2.     Taking Fosamax (alendronate) tablet weekly.         Relevant Medications    alendronate (FOSAMAX) 70 MG tablet     Other Visit Diagnoses       Vitamin D deficiency                Exercising to Stay Healthy  To become healthy and stay healthy, it is recommended that you do moderate-intensity and vigorous-intensity exercise. You can tell that you are exercising at a moderate intensity if your heart starts beating faster and you start breathing faster but can still hold a conversation. You can tell that you are exercising at a vigorous intensity if you are breathing much harder and faster and cannot hold a conversation while exercising.  How can exercise benefit me?  Exercising regularly is important. It has many health benefits, such as:  Improving overall fitness, flexibility, and endurance.  Increasing bone density.  Helping with weight control.  Decreasing body fat.  Increasing muscle strength and endurance.  Reducing stress and tension, anxiety, depression, or anger.  Improving overall health.  What guidelines should I follow while exercising?  Before you start a new exercise program, talk with your health care provider.  Do not exercise so much that you hurt yourself, feel dizzy, or get very short of breath.  Wear comfortable clothes and wear shoes with good support.  Drink plenty of water while you  exercise to prevent dehydration or heat stroke.  Work out until your breathing and your heartbeat get faster (moderate intensity).  How often should I exercise?  Choose an activity that you enjoy, and set realistic goals. Your health care provider can help you make an activity plan that is individually designed and works best for you.  Exercise regularly as told by your health care provider. This may include:  Doing strength training two times a week, such as:  Lifting weights.  Using resistance bands.  Push-ups.  Sit-ups.  Yoga.  Doing a certain intensity of exercise for a given amount of time. Choose from these options:  A total of 150 minutes of moderate-intensity exercise every week.  A total of 75 minutes of vigorous-intensity exercise every week.  A mix of moderate-intensity and vigorous-intensity exercise every week.  Children, pregnant women, people who have not exercised regularly, people who are overweight, and older adults may need to talk with a health care provider about what activities are safe to perform. If you have a medical condition, be sure to talk with your health care provider before you start a new exercise program.  What are some exercise ideas?  Moderate-intensity exercise ideas include:  Walking 1 mile (1.6 km) in about 15 minutes.  Biking.  Hiking.  Golfing.  Dancing.  Water aerobics.  Vigorous-intensity exercise ideas include:  Walking 4.5 miles (7.2 km) or more in about 1 hour.  Jogging or running 5 miles (8 km) in about 1 hour.  Biking 10 miles (16.1 km) or more in about 1 hour.  Lap swimming.  Roller-skating or in-line skating.  Cross-country skiing.  Vigorous competitive sports, such as football, basketball, and soccer.  Jumping rope.  Aerobic dancing.  What are some everyday activities that can help me get exercise?  Yard work, such as:  Pushing a .  Raking and bagging leaves.  Washing your car.  Pushing a stroller.  Shoveling snow.  Gardening.  Washing windows or  floors.  How can I be more active in my day-to-day activities?  Use stairs instead of an elevator.  Take a walk during your lunch break.  If you drive, park your car farther away from your work or school.  If you take public transportation, get off one stop early and walk the rest of the way.  Stand up or walk around during all of your indoor phone calls.  Get up, stretch, and walk around every 30 minutes throughout the day.  Enjoy exercise with a friend. Support to continue exercising will help you keep a regular routine of activity.  Where to find more information  You can find more information about exercising to stay healthy from:  [place] Department of Health and Human Services: www.hhs.gov  Centers for Disease Control and Prevention (CDC): www.cdc.gov  Summary  Exercising regularly is important. It will improve your overall fitness, flexibility, and endurance.  Regular exercise will also improve your overall health. It can help you control your weight, reduce stress, and improve your bone density.  Do not exercise so much that you hurt yourself, feel dizzy, or get very short of breath.  Before you start a new exercise program, talk with your health care provider.  This information is not intended to replace advice given to you by your health care provider. Make sure you discuss any questions you have with your health care provider.  Document Revised: 04/15/2022 Document Reviewed: 04/15/2022  Elsevier Patient Education © 2023 [employer]

## 2024-03-05 NOTE — TELEPHONE ENCOUNTER
"Called and left a voicemail asking to callback to go over the following results from Dr. Kruger :    HUB TO RELAY:    \"Urinalysis shows 21-50 white blood cells.  If the patient is having symptoms, we will treat with antibiotic.  If not, We will assume it is contaminant.  Continue drinking lots of water.       Slight decrease in hemoglobin but hematocrit and RBC count are normal.  Other blood cells are normal.  We will continue to watch.     Kidney function has improved since last time.     Liver enzymes and blood sugar, urinalysis, electrolytes, are all acceptable.\"  "

## 2024-03-06 NOTE — TELEPHONE ENCOUNTER
"Left voicemail for patient to return call.      HUB TO RELAY:     \"Urinalysis shows 21-50 white blood cells.  If the patient is having symptoms, we will treat with antibiotic.  If not, We will assume it is contaminant.  Continue drinking lots of water.       Slight decrease in hemoglobin but hematocrit and RBC count are normal.  Other blood cells are normal.  We will continue to watch.     Kidney function has improved since last time.     Liver enzymes and blood sugar, urinalysis, electrolytes, are all acceptable.\"     "

## 2024-03-07 NOTE — TELEPHONE ENCOUNTER
RELAYED MESSAGE TO PATIENT.    PATIENT STATES THAT SHE DOESN'T HAVE ANY SYMPTOMS BUT IF SHE DOES SHE WILL CALL BACK.

## 2024-03-07 NOTE — TELEPHONE ENCOUNTER
Left voicemail for patient to return call.      We have called mutiple times leaving voices each time w/ no response. I have printed labs to send to her home.

## 2024-03-08 ENCOUNTER — TELEPHONE (OUTPATIENT)
Dept: INTERNAL MEDICINE | Facility: CLINIC | Age: 79
End: 2024-03-08
Payer: MEDICARE

## 2024-03-08 RX ORDER — NITROFURANTOIN 25; 75 MG/1; MG/1
100 CAPSULE ORAL 2 TIMES DAILY
Qty: 10 CAPSULE | Refills: 0 | Status: SHIPPED | OUTPATIENT
Start: 2024-03-08 | End: 2024-03-13

## 2024-03-08 NOTE — TELEPHONE ENCOUNTER
Caller: Payal Nix    Relationship: Self    Best call back number: [phone]     What medication are you requesting: ANTIBIOTICS    What are your current symptoms: URINARY TRACT INFECTION    If a prescription is needed, what is your preferred pharmacy and phone number: [facility] [address] [place] [address] [phone] [facility] [phone] FX     Additional notes: THE PATIENT IS EXPERIENCING SYMPTOMS OF UTI NOW, SHE IS REQUESTING THE MEDICATION THAT DR PORTILLO SAID THAT SHE WOULD PRESCRIBE IN A PREVIOUS MESSAGE/

## 2024-05-22 DIAGNOSIS — I10 BENIGN ESSENTIAL HYPERTENSION: ICD-10-CM

## 2024-05-22 RX ORDER — METOPROLOL SUCCINATE 100 MG/1
TABLET, EXTENDED RELEASE ORAL
Qty: 135 TABLET | Refills: 0 | Status: SHIPPED | OUTPATIENT
Start: 2024-05-22

## 2024-05-22 RX ORDER — SPIRONOLACTONE 100 MG/1
100 TABLET, FILM COATED ORAL DAILY
Qty: 90 TABLET | Refills: 0 | Status: SHIPPED | OUTPATIENT
Start: 2024-05-22

## 2024-05-22 NOTE — TELEPHONE ENCOUNTER
Rx Refill Note  Requested Prescriptions     Pending Prescriptions Disp Refills    spironolactone (ALDACTONE) 100 MG tablet [Pharmacy Med Name: Spironolactone 100 MG Oral Tablet] 90 tablet 0     Sig: Take 1 tablet by mouth once daily    metoprolol succinate XL (TOPROL-XL) 100 MG 24 hr tablet [Pharmacy Med Name: Metoprolol Succinate  MG Oral Tablet Extended Release 24 Hour] 135 tablet 0     Sig: TAKE 1/2 (ONE-HALF) TABLET BY MOUTH IN THE MORNING AND 1 TABLET IN THE EVENING      Last office visit with prescribing clinician: 3/1/2024   Last telemedicine visit with prescribing clinician: Visit date not found   Next office visit with prescribing clinician: 8/27/2024                         Would you like a call back once the refill request has been completed: [] Yes [] No    If the office needs to give you a call back, can they leave a voicemail: [] Yes [] No    Winnie Florian LPN  05/22/24, 15:05 EDT

## 2024-07-12 ENCOUNTER — OFFICE VISIT (OUTPATIENT)
Dept: INTERNAL MEDICINE | Facility: CLINIC | Age: 79
End: 2024-07-12
Payer: MEDICARE

## 2024-07-12 VITALS
SYSTOLIC BLOOD PRESSURE: 136 MMHG | DIASTOLIC BLOOD PRESSURE: 66 MMHG | WEIGHT: 132 LBS | BODY MASS INDEX: 23.39 KG/M2 | HEART RATE: 76 BPM | TEMPERATURE: 97.1 F | HEIGHT: 63 IN

## 2024-07-12 DIAGNOSIS — J02.9 SORE THROAT: ICD-10-CM

## 2024-07-12 DIAGNOSIS — J01.10 ACUTE NON-RECURRENT FRONTAL SINUSITIS: ICD-10-CM

## 2024-07-12 DIAGNOSIS — R05.1 ACUTE COUGH: Primary | ICD-10-CM

## 2024-07-12 LAB
EXPIRATION DATE: NORMAL
EXPIRATION DATE: NORMAL
FLUAV AG UPPER RESP QL IA.RAPID: NOT DETECTED
FLUBV AG UPPER RESP QL IA.RAPID: NOT DETECTED
INTERNAL CONTROL: NORMAL
INTERNAL CONTROL: NORMAL
Lab: NORMAL
Lab: NORMAL
S PYO AG THROAT QL: NEGATIVE
SARS-COV-2 AG UPPER RESP QL IA.RAPID: NOT DETECTED

## 2024-07-12 PROCEDURE — G2211 COMPLEX E/M VISIT ADD ON: HCPCS

## 2024-07-12 PROCEDURE — 87880 STREP A ASSAY W/OPTIC: CPT

## 2024-07-12 PROCEDURE — 1160F RVW MEDS BY RX/DR IN RCRD: CPT

## 2024-07-12 PROCEDURE — 99213 OFFICE O/P EST LOW 20 MIN: CPT

## 2024-07-12 PROCEDURE — 1159F MED LIST DOCD IN RCRD: CPT

## 2024-07-12 PROCEDURE — 87428 SARSCOV & INF VIR A&B AG IA: CPT

## 2024-07-12 PROCEDURE — 3078F DIAST BP <80 MM HG: CPT

## 2024-07-12 PROCEDURE — 3075F SYST BP GE 130 - 139MM HG: CPT

## 2024-07-12 PROCEDURE — 1126F AMNT PAIN NOTED NONE PRSNT: CPT

## 2024-07-12 RX ORDER — AMOXICILLIN AND CLAVULANATE POTASSIUM 875; 125 MG/1; MG/1
1 TABLET, FILM COATED ORAL 2 TIMES DAILY
Qty: 14 TABLET | Refills: 0 | Status: SHIPPED | OUTPATIENT
Start: 2024-07-12 | End: 2024-07-19

## 2024-07-12 RX ORDER — BENZONATATE 100 MG/1
100 CAPSULE ORAL 3 TIMES DAILY PRN
Qty: 30 CAPSULE | Refills: 0 | Status: SHIPPED | OUTPATIENT
Start: 2024-07-12

## 2024-07-12 NOTE — PROGRESS NOTES
Acute Office Visit      Name: Payal Nix    : 1945     MRN: [id number]   Care Team: Patient Care Team:  Sindy Kruger MD as PCP - General  Sindy Kruger MD as PCP - Family Medicine  Bry Allen MD as Consulting Physician (Ophthalmology)    Chief Complaint  Sinus Problem (Runny nose, sore throat, ear fullness, watery eyes, sinus pressure, productive cough with green phlegm. )    Subjective     History of Present Illness:    Payal is a pleasant 79-year-old female who comes to the office today for cold symptoms.    She states that she has been having these symptoms for approximately 1 week and it is getting worse.    She has a runny nose however, states that this is continuous over the last 6 months.  She has been prescribed Atrovent which helps.    She is experiencing bilateral ear fullness, denies pain.  She is experiencing a productive cough with green phlegm.  She has a sore throat.  She is experiencing headache and pressure behind her eyes.  She is also experiencing significant fatigue.    She denies fever, chills.  Denies chest pain, shortness of breath.  Denies nausea, vomiting, diarrhea.  Denies changes in appetite.    She has been taking OTC cough syrup along with nasal sprays with little to no effect.    Review of Systems:  Congestion, eye pressure, ear pressure  Past Medical History:   Diagnosis Date    Arthritis     Cellulitis 2022    Cervical cancer     Dr. Orr    Cervical cancer     in situ; Hysterectomy     Chronic right-sided thoracic back pain 2019    Fatigue 2019    Fibrocystic breast     Hyperlipidemia     Hypertension     Menopausal state     Osteopenia     Overweight with body mass index (BMI) of 25 to 25.9 in adult 2021       Past Surgical History:   Procedure Laterality Date    BREAST CYST ASPIRATION      CATARACT EXTRACTION, BILATERAL Bilateral         D & C WITH CERVICAL CONIZATION  1981    HYSTERECTOMY      Ovaries  intact. cervical cancer in situ       Social History     Socioeconomic History    Marital status:    Tobacco Use    Smoking status: Never    Smokeless tobacco: Never   Vaping Use    Vaping status: Never Used   Substance and Sexual Activity    Alcohol use: Yes     Alcohol/week: 1.0 standard drink of alcohol     Types: 1 Standard drinks or equivalent per week     Comment: monthly    Drug use: No    Sexual activity: Yes     Partners: Male     Birth control/protection: Post-menopausal         Current Outpatient Medications:     alendronate (FOSAMAX) 70 MG tablet, Take 1 tablet by mouth Every 7 (Seven) Days., Disp: 15 tablet, Rfl: 1    aspirin 81 MG tablet, Take 1 tablet by mouth Daily., Disp: , Rfl:     Calcium Citrate-Vitamin D 500-500 MG-UNIT chewable tablet, 1 po 2 times a day, Disp: , Rfl:     lisinopril (PRINIVIL,ZESTRIL) 10 MG tablet, Take 1 tablet by mouth Every Night., Disp: 90 tablet, Rfl: 1    metoprolol succinate XL (TOPROL-XL) 100 MG 24 hr tablet, TAKE 1/2 (ONE-HALF) TABLET BY MOUTH IN THE MORNING AND 1 TABLET IN THE EVENING (Patient taking differently: Take 1 tablet by mouth Daily.), Disp: 135 tablet, Rfl: 0    pravastatin (PRAVACHOL) 20 MG tablet, Take 1 tablet by mouth Every Evening., Disp: 90 tablet, Rfl: 1    Proctofoam HC 1-1 % rectal foam, APPLY CREAM RECTALLY AS NEEDED, Disp: 10 g, Rfl: 0    spironolactone (ALDACTONE) 100 MG tablet, Take 1 tablet by mouth once daily, Disp: 90 tablet, Rfl: 0    vitamin B-12 (CYANOCOBALAMIN) 500 MCG tablet, Take 1 tablet by mouth Daily., Disp: , Rfl:     vitamin C (ASCORBIC ACID) 500 MG tablet, Take 1 tablet by mouth Daily. Once in a while, Disp: , Rfl:     amoxicillin-clavulanate (AUGMENTIN) 875-125 MG per tablet, Take 1 tablet by mouth 2 (Two) Times a Day for 7 days., Disp: 14 tablet, Rfl: 0    azelastine (ASTELIN) 0.1 % nasal spray, 2 sprays into the nostril(s) as directed by provider 2 (Two) Times a Day. Use in each nostril as directed (Patient not taking:  "Reported on 7/12/2024), Disp: 1 each, Rfl: 12    benzonatate (Tessalon Perles) 100 MG capsule, Take 1 capsule by mouth 3 (Three) Times a Day As Needed for Cough., Disp: 30 capsule, Rfl: 0    ipratropium (ATROVENT) 0.06 % nasal spray, 2 sprays into the nostril(s) as directed by provider 3 (Three) Times a Day. (Patient not taking: Reported on 7/12/2024), Disp: 1 each, Rfl: 5    Procedures    PHQ-9 Total Score:      Objective     Vital Signs  /66 (BP Location: Left arm, Patient Position: Sitting, Cuff Size: Adult)   Pulse 76   Temp 97.1 °F (36.2 °C) (Temporal)   Ht 160 cm (62.99\")   Wt 59.9 kg (132 lb)   BMI 23.39 kg/m²   Estimated body mass index is 23.39 kg/m² as calculated from the following:    Height as of this encounter: 160 cm (62.99\").    Weight as of this encounter: 59.9 kg (132 lb).    BMI is within normal parameters. No other follow-up for BMI required.      Physical Exam  Vitals and nursing note reviewed.   HENT:      Head: Normocephalic.      Right Ear: Ear canal and external ear normal. A middle ear effusion is present.      Left Ear: Ear canal and external ear normal. A middle ear effusion is present.      Nose: Congestion present.      Mouth/Throat:      Mouth: Mucous membranes are moist.      Pharynx: Posterior oropharyngeal erythema present.   Eyes:      Pupils: Pupils are equal, round, and reactive to light.   Cardiovascular:      Rate and Rhythm: Normal rate.   Pulmonary:      Effort: Pulmonary effort is normal.      Breath sounds: Normal breath sounds.   Skin:     General: Skin is warm and dry.   Neurological:      General: No focal deficit present.      Mental Status: She is alert and oriented to person, place, and time.   Psychiatric:         Mood and Affect: Mood normal.         Behavior: Behavior normal.         Thought Content: Thought content normal.         Judgment: Judgment normal.          Assessment and Plan     Assessment/Plan:  Diagnoses and all orders for this visit:    1. " Acute cough (Primary)  -     POC Rapid Strep A  -     POCT SARS-CoV-2 + Flu Antigen MARLA  -     benzonatate (Tessalon Perles) 100 MG capsule; Take 1 capsule by mouth 3 (Three) Times a Day As Needed for Cough.  Dispense: 30 capsule; Refill: 0  -Flu/COVID-negative in office today.  -May continue with OTC medications as needed for pain and discomfort.    2. Sore throat  -     POC Rapid Strep A  -     POCT SARS-CoV-2 + Flu Antigen MARLA    3. Acute non-recurrent frontal sinusitis  -     amoxicillin-clavulanate (AUGMENTIN) 875-125 MG per tablet; Take 1 tablet by mouth 2 (Two) Times a Day for 7 days.  Dispense: 14 tablet; Refill: 0       There are no Patient Instructions on file for this visit.  Plan of care reviewed with patient at the conclusion of today's visit. Education was provided regarding diagnosis, management and any prescribed or recommended OTC medications.  Patient verbalizes understanding of and agreement with management plan.    Follow Up  Return for Next Scheduled Follow up. 8/2024 for continued management of chronic conditions.    Dennise Pringle, APRN

## 2024-09-18 DIAGNOSIS — I10 BENIGN ESSENTIAL HYPERTENSION: ICD-10-CM

## 2024-09-18 RX ORDER — SPIRONOLACTONE 100 MG/1
100 TABLET, FILM COATED ORAL DAILY
Qty: 90 TABLET | Refills: 0 | Status: SHIPPED | OUTPATIENT
Start: 2024-09-18

## 2024-10-07 DIAGNOSIS — I10 BENIGN ESSENTIAL HYPERTENSION: ICD-10-CM

## 2024-10-07 RX ORDER — METOPROLOL SUCCINATE 100 MG/1
TABLET, EXTENDED RELEASE ORAL
Qty: 135 TABLET | Refills: 0 | Status: SHIPPED | OUTPATIENT
Start: 2024-10-07

## 2024-10-28 ENCOUNTER — TRANSCRIBE ORDERS (OUTPATIENT)
Dept: ADMINISTRATIVE | Facility: HOSPITAL | Age: 79
End: 2024-10-28
Payer: MEDICARE

## 2024-10-28 DIAGNOSIS — Z12.31 VISIT FOR SCREENING MAMMOGRAM: Primary | ICD-10-CM

## 2024-11-25 DIAGNOSIS — I10 BENIGN ESSENTIAL HYPERTENSION: ICD-10-CM

## 2024-11-25 DIAGNOSIS — E78.00 HYPERCHOLESTEROLEMIA: ICD-10-CM

## 2024-11-25 RX ORDER — LISINOPRIL 10 MG/1
10 TABLET ORAL NIGHTLY
Qty: 90 TABLET | Refills: 0 | Status: SHIPPED | OUTPATIENT
Start: 2024-11-25 | End: 2024-11-26 | Stop reason: SDUPTHER

## 2024-11-25 RX ORDER — PRAVASTATIN SODIUM 20 MG
20 TABLET ORAL EVERY EVENING
Qty: 90 TABLET | Refills: 0 | Status: SHIPPED | OUTPATIENT
Start: 2024-11-25 | End: 2024-11-26 | Stop reason: SDUPTHER

## 2024-11-26 ENCOUNTER — LAB (OUTPATIENT)
Dept: LAB | Facility: HOSPITAL | Age: 79
End: 2024-11-26
Payer: MEDICARE

## 2024-11-26 ENCOUNTER — OFFICE VISIT (OUTPATIENT)
Dept: INTERNAL MEDICINE | Facility: CLINIC | Age: 79
End: 2024-11-26
Payer: MEDICARE

## 2024-11-26 VITALS
DIASTOLIC BLOOD PRESSURE: 62 MMHG | TEMPERATURE: 97.8 F | WEIGHT: 137.8 LBS | HEART RATE: 62 BPM | HEIGHT: 63 IN | BODY MASS INDEX: 24.41 KG/M2 | OXYGEN SATURATION: 98 % | SYSTOLIC BLOOD PRESSURE: 124 MMHG

## 2024-11-26 DIAGNOSIS — E55.9 VITAMIN D DEFICIENCY: ICD-10-CM

## 2024-11-26 DIAGNOSIS — I10 BENIGN ESSENTIAL HYPERTENSION: ICD-10-CM

## 2024-11-26 DIAGNOSIS — Z00.00 MEDICARE ANNUAL WELLNESS VISIT, SUBSEQUENT: Primary | ICD-10-CM

## 2024-11-26 DIAGNOSIS — H35.033 HYPERTENSIVE RETINOPATHY OF BOTH EYES: ICD-10-CM

## 2024-11-26 DIAGNOSIS — Z00.00 ANNUAL PHYSICAL EXAM: ICD-10-CM

## 2024-11-26 DIAGNOSIS — E78.00 HYPERCHOLESTEROLEMIA: ICD-10-CM

## 2024-11-26 DIAGNOSIS — M85.89 OSTEOPENIA OF MULTIPLE SITES: ICD-10-CM
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
## 2024-11-26 LAB
25(OH)D3 SERPL-MCNC: 36.4 NG/ML (ref 30–100)
ALBUMIN SERPL-MCNC: 4.4 G/DL (ref 3.5–5.2)
ALBUMIN/GLOB SERPL: 1.7 G/DL
ALP SERPL-CCNC: 66 U/L (ref 39–117)
ALT SERPL W P-5'-P-CCNC: 13 U/L (ref 1–33)
ANION GAP SERPL CALCULATED.3IONS-SCNC: 9.9 MMOL/L (ref 5–15)
AST SERPL-CCNC: 18 U/L (ref 1–32)
BASOPHILS # BLD AUTO: 0.04 10*3/MM3 (ref 0–0.2)
BASOPHILS NFR BLD AUTO: 0.6 % (ref 0–1.5)
BILIRUB SERPL-MCNC: 0.7 MG/DL (ref 0–1.2)
BUN SERPL-MCNC: 21 MG/DL (ref 8–23)
BUN/CREAT SERPL: 17.8 (ref 7–25)
CALCIUM SPEC-SCNC: 10.3 MG/DL (ref 8.6–10.5)
CHLORIDE SERPL-SCNC: 104 MMOL/L (ref 98–107)
CHOLEST SERPL-MCNC: 162 MG/DL (ref 0–200)
CO2 SERPL-SCNC: 25.1 MMOL/L (ref 22–29)
CREAT SERPL-MCNC: 1.18 MG/DL (ref 0.57–1)
DEPRECATED RDW RBC AUTO: 40.6 FL (ref 37–54)
EGFRCR SERPLBLD CKD-EPI 2021: 47.4 ML/MIN/1.73
EOSINOPHIL # BLD AUTO: 0.12 10*3/MM3 (ref 0–0.4)
EOSINOPHIL NFR BLD AUTO: 1.7 % (ref 0.3–6.2)
ERYTHROCYTE [DISTWIDTH] IN BLOOD BY AUTOMATED COUNT: 12.1 % (ref 12.3–15.4)
GLOBULIN UR ELPH-MCNC: 2.6 GM/DL
GLUCOSE SERPL-MCNC: 94 MG/DL (ref 65–99)
HCT VFR BLD AUTO: 36.8 % (ref 34–46.6)
HDLC SERPL-MCNC: 55 MG/DL (ref 40–60)
HGB BLD-MCNC: 12.4 G/DL (ref 12–15.9)
IMM GRANULOCYTES # BLD AUTO: 0.02 10*3/MM3 (ref 0–0.05)
IMM GRANULOCYTES NFR BLD AUTO: 0.3 % (ref 0–0.5)
LDLC SERPL CALC-MCNC: 90 MG/DL (ref 0–100)
LDLC/HDLC SERPL: 1.6 {RATIO}
LYMPHOCYTES # BLD AUTO: 0.89 10*3/MM3 (ref 0.7–3.1)
LYMPHOCYTES NFR BLD AUTO: 12.4 % (ref 19.6–45.3)
MCH RBC QN AUTO: 31.1 PG (ref 26.6–33)
MCHC RBC AUTO-ENTMCNC: 33.7 G/DL (ref 31.5–35.7)
MCV RBC AUTO: 92.2 FL (ref 79–97)
MONOCYTES # BLD AUTO: 0.46 10*3/MM3 (ref 0.1–0.9)
MONOCYTES NFR BLD AUTO: 6.4 % (ref 5–12)
NEUTROPHILS NFR BLD AUTO: 5.64 10*3/MM3 (ref 1.7–7)
NEUTROPHILS NFR BLD AUTO: 78.6 % (ref 42.7–76)
NRBC BLD AUTO-RTO: 0 /100 WBC (ref 0–0.2)
PLATELET # BLD AUTO: 250 10*3/MM3 (ref 140–450)
PMV BLD AUTO: 9.8 FL (ref 6–12)
POTASSIUM SERPL-SCNC: 4.7 MMOL/L (ref 3.5–5.2)
PROT SERPL-MCNC: 7 G/DL (ref 6–8.5)
RBC # BLD AUTO: 3.99 10*6/MM3 (ref 3.77–5.28)
SODIUM SERPL-SCNC: 139 MMOL/L (ref 136–145)
TRIGL SERPL-MCNC: 95 MG/DL (ref 0–150)
TSH SERPL DL<=0.05 MIU/L-ACNC: 3.58 UIU/ML (ref 0.27–4.2)
VIT B12 BLD-MCNC: 330 PG/ML (ref 211–946)
VLDLC SERPL-MCNC: 17 MG/DL (ref 5–40)
WBC NRBC COR # BLD AUTO: 7.17 10*3/MM3 (ref 3.4–10.8)

## 2024-11-26 PROCEDURE — 99397 PER PM REEVAL EST PAT 65+ YR: CPT | Performed by: INTERNAL MEDICINE

## 2024-11-26 PROCEDURE — 93000 ELECTROCARDIOGRAM COMPLETE: CPT | Performed by: INTERNAL MEDICINE

## 2024-11-26 PROCEDURE — 3078F DIAST BP <80 MM HG: CPT | Performed by: INTERNAL MEDICINE

## 2024-11-26 PROCEDURE — 3074F SYST BP LT 130 MM HG: CPT | Performed by: INTERNAL MEDICINE

## 2024-11-26 PROCEDURE — 82607 VITAMIN B-12: CPT

## 2024-11-26 PROCEDURE — 80061 LIPID PANEL: CPT

## 2024-11-26 PROCEDURE — 84443 ASSAY THYROID STIM HORMONE: CPT

## 2024-11-26 PROCEDURE — 85025 COMPLETE CBC W/AUTO DIFF WBC: CPT

## 2024-11-26 PROCEDURE — 1126F AMNT PAIN NOTED NONE PRSNT: CPT | Performed by: INTERNAL MEDICINE

## 2024-11-26 PROCEDURE — 82306 VITAMIN D 25 HYDROXY: CPT

## 2024-11-26 PROCEDURE — 80053 COMPREHEN METABOLIC PANEL: CPT

## 2024-11-26 PROCEDURE — 1159F MED LIST DOCD IN RCRD: CPT | Performed by: INTERNAL MEDICINE

## 2024-11-26 PROCEDURE — G0439 PPPS, SUBSEQ VISIT: HCPCS | Performed by: INTERNAL MEDICINE

## 2024-11-26 PROCEDURE — 1160F RVW MEDS BY RX/DR IN RCRD: CPT | Performed by: INTERNAL MEDICINE

## 2024-11-26 RX ORDER — METOPROLOL SUCCINATE 100 MG/1
100 TABLET, EXTENDED RELEASE ORAL NIGHTLY
Qty: 90 TABLET | Refills: 1 | Status: SHIPPED | OUTPATIENT
Start: 2024-11-26

## 2024-11-26 RX ORDER — PRAVASTATIN SODIUM 20 MG
20 TABLET ORAL EVERY EVENING
Qty: 90 TABLET | Refills: 1 | Status: SHIPPED | OUTPATIENT
Start: 2024-11-26

## 2024-11-26 RX ORDER — ALENDRONATE SODIUM 70 MG/1
70 TABLET ORAL
Qty: 15 TABLET | Refills: 1 | Status: SHIPPED | OUTPATIENT
Start: 2024-11-26

## 2024-11-26 RX ORDER — SPIRONOLACTONE 100 MG/1
100 TABLET, FILM COATED ORAL DAILY
Qty: 90 TABLET | Refills: 1 | Status: SHIPPED | OUTPATIENT
Start: 2024-11-26

## 2024-11-26 RX ORDER — LISINOPRIL 10 MG/1
10 TABLET ORAL NIGHTLY
Qty: 90 TABLET | Refills: 1 | Status: SHIPPED | OUTPATIENT
Start: 2024-11-26

## 2024-11-26 NOTE — PROGRESS NOTES
Subjective   The ABCs of the Annual Wellness Visit  Medicare Wellness Visit      Payal Nix is a 79 y.o. patient who presents for a Medicare Wellness Visit.    The following portions of the patient's history were reviewed and   updated as appropriate: allergies, current medications, past family history, past medical history, past social history, past surgical history, and problem list.    Compared to one year ago, the patient's physical   health is the same.  Compared to one year ago, the patient's mental   health is the same.    Recent Hospitalizations:  She was not admitted to the hospital during the last year.     Current Medical Providers:  Patient Care Team:  Sindy Kruger MD as PCP - General  Sindy Kruger MD as PCP - Family Medicine  Bry Allen MD as Consulting Physician (Ophthalmology)    Outpatient Medications Prior to Visit   Medication Sig Dispense Refill    aspirin 81 MG tablet Take 1 tablet by mouth Daily.      azelastine (ASTELIN) 0.1 % nasal spray 2 sprays into the nostril(s) as directed by provider 2 (Two) Times a Day. Use in each nostril as directed 1 each 12    Calcium Citrate-Vitamin D 500-500 MG-UNIT chewable tablet 1 po 2 times a day      ipratropium (ATROVENT) 0.06 % nasal spray 2 sprays into the nostril(s) as directed by provider 3 (Three) Times a Day. 1 each 5    Proctofoam HC 1-1 % rectal foam APPLY CREAM RECTALLY AS NEEDED 10 g 0    vitamin B-12 (CYANOCOBALAMIN) 500 MCG tablet Take 1 tablet by mouth Daily.      vitamin C (ASCORBIC ACID) 500 MG tablet Take 1 tablet by mouth Daily. Once in a while      alendronate (FOSAMAX) 70 MG tablet Take 1 tablet by mouth Every 7 (Seven) Days. 15 tablet 1    lisinopril (PRINIVIL,ZESTRIL) 10 MG tablet TAKE 1 TABLET BY MOUTH ONCE DAILY AT NIGHT 90 tablet 0    metoprolol succinate XL (TOPROL-XL) 100 MG 24 hr tablet TAKE 1/2 (ONE-HALF) TABLET BY MOUTH IN THE MORNING AND 1 IN THE EVENING 135 tablet 0    pravastatin (PRAVACHOL) 20 MG  "tablet TAKE 1 TABLET BY MOUTH ONCE DAILY IN THE EVENING 90 tablet 0    spironolactone (ALDACTONE) 100 MG tablet Take 1 tablet by mouth once daily 90 tablet 0    benzonatate (Tessalon Perles) 100 MG capsule Take 1 capsule by mouth 3 (Three) Times a Day As Needed for Cough. (Patient not taking: Reported on 11/26/2024) 30 capsule 0     No facility-administered medications prior to visit.     No opioid medication identified on active medication list. I have reviewed chart for other potential  high risk medication/s and harmful drug interactions in the elderly.      Aspirin is on active medication list. Aspirin use is indicated based on review of current medical condition/s. Pros and cons of this therapy have been discussed today. Benefits of this medication outweigh potential harm.  Patient has been encouraged to continue taking this medication.  .      Patient Active Problem List   Diagnosis    Hemorrhoids    Primary osteoarthritis involving multiple joints    Hypercholesterolemia    Hypertensive retinopathy    Benign essential hypertension    Osteopenia of multiple sites    Menopausal and postmenopausal disorder    Other fatigue    Medicare annual wellness visit, subsequent    Annual physical exam    Fibrocystic disease of left breast    Hair loss    Skin lesion of back    Traumatic ecchymosis of left upper arm    At moderate risk for fall    Upper back pain on left side    History of MRSA infection    Easy bruising    Rhinorrhea     Advance Care Planning Advance Directive is not on file.  ACP discussion was held with the patient during this visit. Patient does not have an advance directive, information provided.            Objective   Vitals:    11/26/24 0906   BP: 124/62   BP Location: Left arm   Patient Position: Sitting   Cuff Size: Adult   Pulse: 62   Temp: 97.8 °F (36.6 °C)   TempSrc: Infrared   SpO2: 98%   Weight: 62.5 kg (137 lb 12.8 oz)   Height: 160 cm (62.99\")   PainSc: 0-No pain       Estimated body mass " "index is 24.42 kg/m² as calculated from the following:    Height as of this encounter: 160 cm (62.99\").    Weight as of this encounter: 62.5 kg (137 lb 12.8 oz).    BMI is within normal parameters. No other follow-up for BMI required.       Does the patient have evidence of cognitive impairment? No  Lab Results   Component Value Date    TRIG 95 11/26/2024    HDL 55 11/26/2024    LDL 90 11/26/2024    VLDL 17 11/26/2024       ECG 12 Lead    Date/Time: 11/26/2024 9:48 AM  Performed by: Sindy Kruger MD    Authorized by: Sindy Kruger MD  Comparison: compared with previous ECG   Similar to previous ECG  Rhythm: sinus rhythm  Rate: normal  BPM: 59  Conduction: conduction normal  ST Segments: ST segments normal  T Waves: T waves normal  QRS axis: normal    Clinical impression: normal ECG                                                                                                Health  Risk Assessment    Smoking Status:  Social History     Tobacco Use   Smoking Status Never   Smokeless Tobacco Never     Alcohol Consumption:  Social History     Substance and Sexual Activity   Alcohol Use Yes    Alcohol/week: 1.0 standard drink of alcohol    Types: 1 Standard drinks or equivalent per week    Comment: monthly       Fall Risk Screen  STEADI Fall Risk Assessment was completed, and patient is at LOW risk for falls.Assessment completed on:11/26/2024    Depression Screening   Little interest or pleasure in doing things? Not at all   Feeling down, depressed, or hopeless? Almost all   PHQ-2 Total Score 3   Trouble falling or staying asleep, or sleeping too much? Not at all   Feeling tired or having little energy? Not at all   Poor appetite or overeating? Not at all   Feeling bad about yourself - or that you are a failure or have let yourself or your family down? Not at all   Trouble concentrating on things, such as reading the newspaper or watching television? Not at all   Moving or speaking so slowly that other people " could have noticed? Or the opposite - being so fidgety or restless that you have been moving around a lot more than usual? Not at all   Thoughts that you would be better off dead, or of hurting yourself in some way? Not at all   PHQ-9 Total Score 3   If you checked off any problems, how difficult have these problems made it for you to do your work, take care of things at home, or get along with other people? Not difficult at all      Health Habits and Functional and Cognitive Screenin/26/2024     9:05 AM   Functional & Cognitive Status   Do you have difficulty preparing food and eating? No   Do you have difficulty bathing yourself, getting dressed or grooming yourself? No   Do you have difficulty using the toilet? No   Do you have difficulty moving around from place to place? No   Do you have trouble with steps or getting out of a bed or a chair? No   Current Diet Well Balanced Diet   Dental Exam Up to date   Eye Exam Up to date   Exercise (times per week) 3 times per week   Current Exercises Include Walking;Weightlifting   Do you need help using the phone?  No   Are you deaf or do you have serious difficulty hearing?  No   Do you need help to go to places out of walking distance? No   Do you need help shopping? No   Do you need help preparing meals?  No   Do you need help with housework?  No   Do you need help with laundry? No   Do you need help taking your medications? No   Do you need help managing money? No   Do you ever drive or ride in a car without wearing a seat belt? No   Have you felt unusual stress, anger or loneliness in the last month? Yes   Who do you live with? Spouse   If you need help, do you have trouble finding someone available to you? No   Have you been bothered in the last four weeks by sexual problems? No   Do you have difficulty concentrating, remembering or making decisions? No           Age-appropriate Screening Schedule:  Refer to the list below for future screening  recommendations based on patient's age, sex and/or medical conditions. Orders for these recommended tests are listed in the plan section. The patient has been provided with a written plan.    Health Maintenance List  Health Maintenance   Topic Date Due    ZOSTER VACCINE (2 of 3) 06/13/2017    RSV Vaccine - Adults (1 - 1-dose 75+ series) Never done    INFLUENZA VACCINE  07/01/2024    COVID-19 Vaccine (5 - 2024-25 season) 09/01/2024    DXA SCAN  04/27/2025    ANNUAL WELLNESS VISIT  11/26/2025    LIPID PANEL  11/26/2025    TDAP/TD VACCINES (3 - Td or Tdap) 02/17/2032    HEPATITIS C SCREENING  Completed    Pneumococcal Vaccine 65+  Completed    MAMMOGRAM  Discontinued    COLORECTAL CANCER SCREENING  Discontinued                                                                                                                                                CMS Preventative Services Quick Reference  Risk Factors Identified During Encounter  Fall Risk-High or Moderate: Information on Fall Prevention Shared in After Visit Summary    The above risks/problems have been discussed with the patient.  Pertinent information has been shared with the patient in the After Visit Summary.  An After Visit Summary and PPPS were made available to the patient.    Follow Up:   Next Medicare Wellness visit to be scheduled in 1 year.         Additional E&M Note during same encounter follows:  Patient has additional, significant, and separately identifiable condition(s)/problem(s) that require work above and beyond the Medicare Wellness Visit     Chief Complaint  Medicare Wellness-subsequent, Hypertension, and Hyperlipidemia    Subjective    HPI  Payal is also being seen today for an annual adult preventative physical exam.        The patient is here for an annual wellness visit.    She reports no chest pain, shortness of breath, or swelling in her feet. She avoids salt in her diet and takes lisinopril and metoprolol without any issues. Her  "current regimen includes one metoprolol pill in the evening, a baby aspirin every other day due to bruising, and a statin at night. She recently requested refills for her statin and another medication. She monitors her daily steps and has noticed a slight weight gain since her last visit. She reports no vein pain or swelling.    She maintains an active lifestyle, including strength training and walking, and manages her household chores independently. She has not experienced any falls.     She occasionally experiences a runny nose, which she attributes to allergies, and uses azelastine and ipratropium nasal sprays as needed. She has never had allergies before and wonders if this could be a result of COVID-19. She reports no issues with alendronate, heartburn, or indigestion.    Her recent eye examination with Dr. Allen showed no concerns. She sees her eye doctor twice a year and also visits a dermatologist. She has a mammogram scheduled for 12/11/2024. She believes she may have a cyst in her breast, which becomes tender after drinking tea.    She fasted for today's lab tests.    SOCIAL HISTORY  She has a dog. She has never smoked.    FAMILY HISTORY  There is no family history of cancer on either side.    IMMUNIZATIONS  She has never had shingles vaccine. She is up to date on Tdap.          Objective   Vital Signs:  /62 (BP Location: Left arm, Patient Position: Sitting, Cuff Size: Adult)   Pulse 62   Temp 97.8 °F (36.6 °C) (Infrared)   Ht 160 cm (62.99\")   Wt 62.5 kg (137 lb 12.8 oz)   SpO2 98%   BMI 24.42 kg/m²   Physical Exam  Vitals and nursing note reviewed.   Constitutional:       Appearance: She is well-developed.   HENT:      Head: Normocephalic.   Eyes:      Conjunctiva/sclera: Conjunctivae normal.      Pupils: Pupils are equal, round, and reactive to light.   Neck:      Thyroid: No thyromegaly.   Cardiovascular:      Rate and Rhythm: Normal rate and regular rhythm.      Pulses: Normal pulses.      " Heart sounds: Normal heart sounds.   Pulmonary:      Effort: Pulmonary effort is normal.      Breath sounds: Normal breath sounds. No wheezing.   Chest:   Breasts:     Right: No inverted nipple, mass, nipple discharge, skin change or tenderness.      Left: No inverted nipple, mass, nipple discharge, skin change or tenderness.   Abdominal:      General: Bowel sounds are normal.      Palpations: Abdomen is soft.      Tenderness: There is no abdominal tenderness.   Musculoskeletal:         General: No tenderness. Normal range of motion.      Cervical back: Normal range of motion and neck supple.      Right lower leg: No edema.      Left lower leg: No edema.   Lymphadenopathy:      Cervical: No cervical adenopathy.      Upper Body:      Right upper body: No supraclavicular, axillary or pectoral adenopathy.      Left upper body: No supraclavicular, axillary or pectoral adenopathy.   Skin:     General: Skin is warm and dry.      Findings: No rash.   Neurological:      Mental Status: She is alert and oriented to person, place, and time.      Cranial Nerves: No cranial nerve deficit.      Sensory: No sensory deficit.      Coordination: Coordination normal.      Gait: Gait normal.   Psychiatric:         Attention and Perception: Attention normal.         Mood and Affect: Mood normal.         Speech: Speech normal.         Behavior: Behavior normal.         Thought Content: Thought content normal.         Cognition and Memory: Cognition normal.         Judgment: Judgment normal.               The following data was reviewed by: Sindy Kruger MD on 11/26/2024:    Results  Testing  EKG is normal.              Assessment and Plan Additional age appropriate preventative wellness advice topics were discussed during today's preventative wellness exam(some topics already addressed during AWV portion of the note above):    Physical Activity: Advised cardiovascular activity 150 minutes per week as tolerated. (example brisk walk  for 30 minutes, 5 days a week).     Nutrition: Discussed nutrition plan with patient. Information shared in after visit summary. Goal is for a well balanced diet to enhance overall health.     Healthy Weight: Discussed current and goal BMI with patient. Steps to attain this goal discussed. Information shared in after visit summary.       1. Annual wellness visit.  Annual physical exam.  Her EKG results are normal, and blood pressure is within the acceptable range. She has gained 5 pounds since the last visit, but her weight remains within the normal range. No concerning findings were detected during the breast exam. She was encouraged to maintain an active lifestyle and a healthy diet.  Her last colonoscopy showed no abnormalities. She has not received the shingles vaccine and was advised to get it due to the severe pain shingles can cause. The RSV vaccine was also recommended and can be administered at [facility] or [facility]. The influenza vaccine was recommended and can be taken at the same time as the RSV vaccine or  by a couple of weeks. She is up-to-date on her Tdap immunization. Lab work and urinalysis will be conducted today.    2. Osteopenia.  She reports no trouble with alendronate, including heartburn or indigestion. A prescription for alendronate will be sent as the current refills may run out before the next visit.  She will continue daily weightbearing exercises including walking and using small hand weights at home.  She will continue taking vitamin D3 and calcium.  A new bone density test is due in April 2025 and will be scheduled for the end of April.    3. Hypertension.  She is currently taking lisinopril and metoprolol nightly and spironolactone every morning.. She takes one whole metoprolol pill in the evening, and this will be corrected in her prescription. She also takes a baby aspirin every other day due to bruising. Prescriptions for lisinopril and metoprolol will be renewed.  Continue  to avoid salt in the diet.    4.  Hypercholesterolemia.  Pravastatin will be continued.  She will continue low-fat healthy diet and regular exercise.    5. Allergic rhinitis.  She experiences occasional runny nose and uses azelastine and ipratropium nasal sprays as needed.       Follow-up  Return in 6 months for follow up.            Follow Up   No follow-ups on file.  Patient was given instructions and counseling regarding her condition or for health maintenance advice. Please see specific information pulled into the AVS if appropriate.  Patient or patient representative verbalized consent for the use of Ambient Listening during the visit with  Sindy Kruger MD for chart documentation. 12/1/2024  09:51 EST

## 2024-12-01 NOTE — PATIENT INSTRUCTIONS
Problem List Items Addressed This Visit          Cardiac and Vasculature    Hypercholesterolemia    Overview     Taking pravastatin every evening.         Relevant Medications    pravastatin (PRAVACHOL) 20 MG tablet    Other Relevant Orders    Lipid Panel (Completed)    TSH (Completed)    Vitamin B12 (Completed)    Benign essential hypertension    Overview     Taking lisinopril 10 mg every evening.  Taking metoprolol XL one of the 100 mg tablet every evening.         Relevant Medications    lisinopril (PRINIVIL,ZESTRIL) 10 MG tablet    metoprolol succinate XL (TOPROL-XL) 100 MG 24 hr tablet    spironolactone (ALDACTONE) 100 MG tablet    Other Relevant Orders    CBC & Differential (Completed)    Comprehensive Metabolic Panel (Completed)    Microalbumin / Creatinine Urine Ratio - Urine, Clean Catch    Urinalysis With Microscopic - Urine, Clean Catch    ECG 12 Lead       Eye    Hypertensive retinopathy    Overview     Regular follow up with Dr. Bry Allen.            Health Encounters    Medicare annual wellness visit, subsequent - Primary    Annual physical exam       Musculoskeletal and Injuries    Osteopenia of multiple sites    Overview     DEXA 1/2021 showed some mild improvement in osteopenia T-scores of the hip.  The lowest T score is -1.8 in the femoral neck.  Spine T-scores decreased slightly but is still very mild osteopenia at -1.1.  DEXA 4/27/23 DEXA 4/2023 shows decline in bone density with the lowest T score in the lumbar spine at -1.4 and the lowest T score in the femoral neck at -2.2.     Taking Fosamax (alendronate) tablet weekly.         Relevant Medications    alendronate (FOSAMAX) 70 MG tablet     Other Visit Diagnoses       Vitamin D deficiency        Relevant Orders    Vitamin D,25-Hydroxy (Completed)          Exercising to Stay Healthy  To become healthy and stay healthy, it is recommended that you do moderate-intensity and vigorous-intensity exercise. You can tell that you are exercising at  a moderate intensity if your heart starts beating faster and you start breathing faster but can still hold a conversation. You can tell that you are exercising at a vigorous intensity if you are breathing much harder and faster and cannot hold a conversation while exercising.  How can exercise benefit me?  Exercising regularly is important. It has many health benefits, such as:  Improving overall fitness, flexibility, and endurance.  Increasing bone density.  Helping with weight control.  Decreasing body fat.  Increasing muscle strength and endurance.  Reducing stress and tension, anxiety, depression, or anger.  Improving overall health.  What guidelines should I follow while exercising?  Before you start a new exercise program, talk with your health care provider.  Do not exercise so much that you hurt yourself, feel dizzy, or get very short of breath.  Wear comfortable clothes and wear shoes with good support.  Drink plenty of water while you exercise to prevent dehydration or heat stroke.  Work out until your breathing and your heartbeat get faster (moderate intensity).  How often should I exercise?  Choose an activity that you enjoy, and set realistic goals. Your health care provider can help you make an activity plan that is individually designed and works best for you.  Exercise regularly as told by your health care provider. This may include:  Doing strength training two times a week, such as:  Lifting weights.  Using resistance bands.  Push-ups.  Sit-ups.  Yoga.  Doing a certain intensity of exercise for a given amount of time. Choose from these options:  A total of 150 minutes of moderate-intensity exercise every week.  A total of 75 minutes of vigorous-intensity exercise every week.  A mix of moderate-intensity and vigorous-intensity exercise every week.  Children, pregnant women, people who have not exercised regularly, people who are overweight, and older adults may need to talk with a health care  provider about what activities are safe to perform. If you have a medical condition, be sure to talk with your health care provider before you start a new exercise program.  What are some exercise ideas?  Moderate-intensity exercise ideas include:  Walking 1 mile (1.6 km) in about 15 minutes.  Biking.  Hiking.  Golfing.  Dancing.  Water aerobics.  Vigorous-intensity exercise ideas include:  Walking 4.5 miles (7.2 km) or more in about 1 hour.  Jogging or running 5 miles (8 km) in about 1 hour.  Biking 10 miles (16.1 km) or more in about 1 hour.  Lap swimming.  Roller-skating or in-line skating.  Cross-country skiing.  Vigorous competitive sports, such as football, basketball, and soccer.  Jumping rope.  Aerobic dancing.  What are some everyday activities that can help me get exercise?  Yard work, such as:  Pushing a .  Raking and bagging leaves.  Washing your car.  Pushing a stroller.  Shoveling snow.  Gardening.  Washing windows or floors.  How can I be more active in my day-to-day activities?  Use stairs instead of an elevator.  Take a walk during your lunch break.  If you drive, park your car farther away from your work or school.  If you take public transportation, get off one stop early and walk the rest of the way.  Stand up or walk around during all of your indoor phone calls.  Get up, stretch, and walk around every 30 minutes throughout the day.  Enjoy exercise with a friend. Support to continue exercising will help you keep a regular routine of activity.  Where to find more information  You can find more information about exercising to stay healthy from:  [place] Department of Health and Human Services: www.hhs.gov  Centers for Disease Control and Prevention (CDC): www.cdc.gov  Summary  Exercising regularly is important. It will improve your overall fitness, flexibility, and endurance.  Regular exercise will also improve your overall health. It can help you control your weight, reduce stress, and  improve your bone density.  Do not exercise so much that you hurt yourself, feel dizzy, or get very short of breath.  Before you start a new exercise program, talk with your health care provider.  This information is not intended to replace advice given to you by your health care provider. Make sure you discuss any questions you have with your health care provider.  Document Revised: 04/15/2022 Document Reviewed: 04/15/2022  Elsevier Patient Education © 2023 [employer]

## 2024-12-05 LAB
NCCN CRITERIA FLAG: NORMAL
TYRER CUZICK SCORE: 0.9

## 2024-12-11 ENCOUNTER — HOSPITAL ENCOUNTER (OUTPATIENT)
Dept: MAMMOGRAPHY | Facility: HOSPITAL | Age: 79
Discharge: HOME | End: 2024-12-11
Admitting: INTERNAL MEDICINE
Payer: MEDICARE

## 2024-12-11 DIAGNOSIS — Z12.31 VISIT FOR SCREENING MAMMOGRAM: ICD-10-CM

## 2024-12-11 PROCEDURE — 77063 BREAST TOMOSYNTHESIS BI: CPT

## 2024-12-11 PROCEDURE — 77067 SCR MAMMO BI INCL CAD: CPT

## 2025-02-25 DIAGNOSIS — I10 BENIGN ESSENTIAL HYPERTENSION: ICD-10-CM

## 2025-02-25 RX ORDER — METOPROLOL SUCCINATE 100 MG/1
TABLET, EXTENDED RELEASE ORAL
Qty: 135 TABLET | Refills: 0 | Status: SHIPPED | OUTPATIENT
Start: 2025-02-25

## 2025-02-25 NOTE — TELEPHONE ENCOUNTER
Rx Refill Note  Requested Prescriptions     Pending Prescriptions Disp Refills    metoprolol succinate XL (TOPROL-XL) 100 MG 24 hr tablet [Pharmacy Med Name: Metoprolol Succinate  MG Oral Tablet Extended Release 24 Hour] 135 tablet 0     Sig: TAKE 1/2 (ONE-HALF) TABLET BY MOUTH IN THE MORNING AND 1 IN THE EVENING      Last office visit with prescribing clinician: 11/26/2024   Last telemedicine visit with prescribing clinician: Visit date not found   Next office visit with prescribing clinician: 5/27/2025                         Would you like a call back once the refill request has been completed: [] Yes [] No    If the office needs to give you a call back, can they leave a voicemail: [] Yes [] No    Mirta Watts MA  02/25/25, 11:01 EST

## 2025-05-27 ENCOUNTER — LAB (OUTPATIENT)
Dept: LAB | Facility: HOSPITAL | Age: 80
End: 2025-05-27
Payer: MEDICARE

## 2025-05-27 ENCOUNTER — OFFICE VISIT (OUTPATIENT)
Dept: INTERNAL MEDICINE | Facility: CLINIC | Age: 80
End: 2025-05-27
Payer: MEDICARE

## 2025-05-27 VITALS
WEIGHT: 132.4 LBS | OXYGEN SATURATION: 100 % | DIASTOLIC BLOOD PRESSURE: 56 MMHG | HEIGHT: 63 IN | BODY MASS INDEX: 23.46 KG/M2 | TEMPERATURE: 97.7 F | HEART RATE: 61 BPM | SYSTOLIC BLOOD PRESSURE: 110 MMHG

## 2025-05-27 DIAGNOSIS — E78.00 HYPERCHOLESTEROLEMIA: ICD-10-CM

## 2025-05-27 DIAGNOSIS — N95.9 MENOPAUSAL AND POSTMENOPAUSAL DISORDER: ICD-10-CM

## 2025-05-27 DIAGNOSIS — R23.3 EASY BRUISING: Chronic | ICD-10-CM

## 2025-05-27 DIAGNOSIS — H35.033 HYPERTENSIVE RETINOPATHY OF BOTH EYES: ICD-10-CM

## 2025-05-27 DIAGNOSIS — M85.89 OSTEOPENIA OF MULTIPLE SITES: ICD-10-CM

## 2025-05-27 DIAGNOSIS — I10 BENIGN ESSENTIAL HYPERTENSION: Primary | ICD-10-CM

## 2025-05-27 DIAGNOSIS — R79.9 ABNORMAL FINDING OF BLOOD CHEMISTRY, UNSPECIFIED: ICD-10-CM

## 2025-05-27 DIAGNOSIS — I10 BENIGN ESSENTIAL HYPERTENSION: ICD-10-CM

## 2025-05-27 DIAGNOSIS — I87.2 VENOUS STASIS DERMATITIS: ICD-10-CM

## 2025-05-27 DIAGNOSIS — K64.9 HEMORRHOIDS, UNSPECIFIED HEMORRHOID TYPE: ICD-10-CM

## 2025-05-27 LAB
25(OH)D3 SERPL-MCNC: 43.9 NG/ML (ref 30–100)
ALBUMIN SERPL-MCNC: 4.6 G/DL (ref 3.5–5.2)
ALBUMIN/GLOB SERPL: 1.6 G/DL
ALP SERPL-CCNC: 71 U/L (ref 39–117)
ALT SERPL W P-5'-P-CCNC: 15 U/L (ref 1–33)
ANION GAP SERPL CALCULATED.3IONS-SCNC: 10.2 MMOL/L (ref 5–15)
AST SERPL-CCNC: 18 U/L (ref 1–32)
BACTERIA UR QL AUTO: ABNORMAL /HPF
BASOPHILS # BLD AUTO: 0.04 10*3/MM3 (ref 0–0.2)
BASOPHILS NFR BLD AUTO: 0.5 % (ref 0–1.5)
BILIRUB SERPL-MCNC: 0.5 MG/DL (ref 0–1.2)
BILIRUB UR QL STRIP: NEGATIVE
BUN SERPL-MCNC: 27 MG/DL (ref 8–23)
BUN/CREAT SERPL: 19 (ref 7–25)
CALCIUM SPEC-SCNC: 10.6 MG/DL (ref 8.6–10.5)
CHLORIDE SERPL-SCNC: 107 MMOL/L (ref 98–107)
CHOLEST SERPL-MCNC: 155 MG/DL (ref 0–200)
CLARITY UR: CLEAR
CO2 SERPL-SCNC: 24.8 MMOL/L (ref 22–29)
COLOR UR: YELLOW
CREAT SERPL-MCNC: 1.42 MG/DL (ref 0.57–1)
DEPRECATED RDW RBC AUTO: 41.9 FL (ref 37–54)
EGFRCR SERPLBLD CKD-EPI 2021: 37.7 ML/MIN/1.73
EOSINOPHIL # BLD AUTO: 0.14 10*3/MM3 (ref 0–0.4)
EOSINOPHIL NFR BLD AUTO: 1.7 % (ref 0.3–6.2)
ERYTHROCYTE [DISTWIDTH] IN BLOOD BY AUTOMATED COUNT: 12.1 % (ref 12.3–15.4)
GLOBULIN UR ELPH-MCNC: 2.8 GM/DL
GLUCOSE SERPL-MCNC: 105 MG/DL (ref 65–99)
GLUCOSE UR STRIP-MCNC: NEGATIVE MG/DL
HBA1C MFR BLD: 5.8 % (ref 4.8–5.6)
HCT VFR BLD AUTO: 37.6 % (ref 34–46.6)
HDLC SERPL-MCNC: 54 MG/DL (ref 40–60)
HGB BLD-MCNC: 12.3 G/DL (ref 12–15.9)
HGB UR QL STRIP.AUTO: NEGATIVE
HYALINE CASTS UR QL AUTO: ABNORMAL /LPF
IMM GRANULOCYTES # BLD AUTO: 0.03 10*3/MM3 (ref 0–0.05)
IMM GRANULOCYTES NFR BLD AUTO: 0.4 % (ref 0–0.5)
KETONES UR QL STRIP: NEGATIVE
LDLC SERPL CALC-MCNC: 84 MG/DL (ref 0–100)
LDLC/HDLC SERPL: 1.53 {RATIO}
LEUKOCYTE ESTERASE UR QL STRIP.AUTO: ABNORMAL
LYMPHOCYTES # BLD AUTO: 0.98 10*3/MM3 (ref 0.7–3.1)
LYMPHOCYTES NFR BLD AUTO: 11.9 % (ref 19.6–45.3)
MCH RBC QN AUTO: 30.8 PG (ref 26.6–33)
MCHC RBC AUTO-ENTMCNC: 32.7 G/DL (ref 31.5–35.7)
MCV RBC AUTO: 94 FL (ref 79–97)
MONOCYTES # BLD AUTO: 0.53 10*3/MM3 (ref 0.1–0.9)
MONOCYTES NFR BLD AUTO: 6.4 % (ref 5–12)
NEUTROPHILS NFR BLD AUTO: 6.55 10*3/MM3 (ref 1.7–7)
NEUTROPHILS NFR BLD AUTO: 79.1 % (ref 42.7–76)
NITRITE UR QL STRIP: NEGATIVE
NRBC BLD AUTO-RTO: 0 /100 WBC (ref 0–0.2)
PH UR STRIP.AUTO: 6 [PH] (ref 5–8)
PLATELET # BLD AUTO: 258 10*3/MM3 (ref 140–450)
PMV BLD AUTO: 10.2 FL (ref 6–12)
POTASSIUM SERPL-SCNC: 5.3 MMOL/L (ref 3.5–5.2)
PROT SERPL-MCNC: 7.4 G/DL (ref 6–8.5)
PROT UR QL STRIP: NEGATIVE
RBC # BLD AUTO: 4 10*6/MM3 (ref 3.77–5.28)
RBC # UR STRIP: ABNORMAL /HPF
REF LAB TEST METHOD: ABNORMAL
SODIUM SERPL-SCNC: 142 MMOL/L (ref 136–145)
SP GR UR STRIP: 1.02 (ref 1–1.03)
SQUAMOUS #/AREA URNS HPF: ABNORMAL /HPF
TRIGL SERPL-MCNC: 93 MG/DL (ref 0–150)
UROBILINOGEN UR QL STRIP: ABNORMAL
VIT B12 BLD-MCNC: 525 PG/ML (ref 211–946)
VLDLC SERPL-MCNC: 17 MG/DL (ref 5–40)
WBC # UR STRIP: ABNORMAL /HPF
WBC NRBC COR # BLD AUTO: 8.27 10*3/MM3 (ref 3.4–10.8)

## 2025-05-27 PROCEDURE — 82607 VITAMIN B-12: CPT

## 2025-05-27 PROCEDURE — G2211 COMPLEX E/M VISIT ADD ON: HCPCS | Performed by: INTERNAL MEDICINE

## 2025-05-27 PROCEDURE — 82570 ASSAY OF URINE CREATININE: CPT

## 2025-05-27 PROCEDURE — 80061 LIPID PANEL: CPT

## 2025-05-27 PROCEDURE — 85025 COMPLETE CBC W/AUTO DIFF WBC: CPT

## 2025-05-27 PROCEDURE — 82043 UR ALBUMIN QUANTITATIVE: CPT

## 2025-05-27 PROCEDURE — 99214 OFFICE O/P EST MOD 30 MIN: CPT | Performed by: INTERNAL MEDICINE

## 2025-05-27 PROCEDURE — 83036 HEMOGLOBIN GLYCOSYLATED A1C: CPT

## 2025-05-27 PROCEDURE — 3078F DIAST BP <80 MM HG: CPT | Performed by: INTERNAL MEDICINE

## 2025-05-27 PROCEDURE — 3074F SYST BP LT 130 MM HG: CPT | Performed by: INTERNAL MEDICINE

## 2025-05-27 PROCEDURE — 81001 URINALYSIS AUTO W/SCOPE: CPT

## 2025-05-27 PROCEDURE — 82306 VITAMIN D 25 HYDROXY: CPT

## 2025-05-27 PROCEDURE — 1126F AMNT PAIN NOTED NONE PRSNT: CPT | Performed by: INTERNAL MEDICINE

## 2025-05-27 PROCEDURE — 80053 COMPREHEN METABOLIC PANEL: CPT

## 2025-05-27 RX ORDER — SPIRONOLACTONE 100 MG/1
100 TABLET, FILM COATED ORAL DAILY
Qty: 90 TABLET | Refills: 3 | Status: SHIPPED | OUTPATIENT
Start: 2025-05-27

## 2025-05-27 RX ORDER — LISINOPRIL 10 MG/1
10 TABLET ORAL NIGHTLY
Qty: 90 TABLET | Refills: 3 | Status: SHIPPED | OUTPATIENT
Start: 2025-05-27

## 2025-05-27 RX ORDER — ALENDRONATE SODIUM 70 MG/1
70 TABLET ORAL
Qty: 15 TABLET | Refills: 3 | Status: SHIPPED | OUTPATIENT
Start: 2025-05-27

## 2025-05-27 RX ORDER — HYDROCORTISONE 25 MG/G
CREAM TOPICAL 2 TIMES DAILY
Qty: 28 G | Refills: 1 | Status: SHIPPED | OUTPATIENT
Start: 2025-05-27

## 2025-05-27 RX ORDER — PRAVASTATIN SODIUM 20 MG
20 TABLET ORAL EVERY EVENING
Qty: 90 TABLET | Refills: 3 | Status: SHIPPED | OUTPATIENT
Start: 2025-05-27

## 2025-05-27 RX ORDER — METOPROLOL SUCCINATE 100 MG/1
100 TABLET, EXTENDED RELEASE ORAL DAILY
Qty: 90 TABLET | Refills: 3 | Status: SHIPPED | OUTPATIENT
Start: 2025-05-27

## 2025-05-27 NOTE — PATIENT INSTRUCTIONS
Problem List Items Addressed This Visit          Cardiac and Vasculature    Hypercholesterolemia    Overview   Taking pravastatin every evening.         Relevant Medications    pravastatin (PRAVACHOL) 20 MG tablet    Other Relevant Orders    Comprehensive Metabolic Panel    CBC & Differential    Lipid Panel    Vitamin B12    Hemoglobin A1c    Benign essential hypertension - Primary    Overview   Taking lisinopril 10 mg every evening.  Taking metoprolol XL one of the 100 mg tablet every morning.         Relevant Medications    metoprolol succinate XL (TOPROL-XL) 100 MG 24 hr tablet    spironolactone (ALDACTONE) 100 MG tablet    lisinopril (PRINIVIL,ZESTRIL) 10 MG tablet    Other Relevant Orders    Microalbumin / Creatinine Urine Ratio - Urine, Clean Catch    Urinalysis With Microscopic - Urine, Clean Catch    Venous stasis dermatitis       Eye    Hypertensive retinopathy    Overview   Regular follow up with Dr. Bry Allen.            Gastrointestinal Abdominal     Hemorrhoids    Relevant Medications    Hydrocortisone, Perianal, (ANUSOL-HC) 2.5 % rectal cream       Genitourinary and Reproductive     Menopausal and postmenopausal disorder    Relevant Orders    DEXA Bone Density Axial       Musculoskeletal and Injuries    Osteopenia of multiple sites    Overview   DEXA 1/2021 showed some mild improvement in osteopenia T-scores of the hip.  The lowest T score is -1.8 in the femoral neck.  Spine T-scores decreased slightly but is still very mild osteopenia at -1.1.  DEXA 4/27/23 DEXA 4/2023 shows decline in bone density with the lowest T score in the lumbar spine at -1.4 and the lowest T score in the femoral neck at -2.2.     Taking Fosamax (alendronate) tablet weekly.         Relevant Medications    alendronate (FOSAMAX) 70 MG tablet    Other Relevant Orders    Vitamin D,25-Hydroxy       Skin    Easy bruising (Chronic)     Other Visit Diagnoses         Abnormal finding of blood chemistry, unspecified        Relevant  Orders    Hemoglobin A1c          Exercising to Stay Healthy  To become healthy and stay healthy, it is recommended that you do moderate-intensity and vigorous-intensity exercise. You can tell that you are exercising at a moderate intensity if your heart starts beating faster and you start breathing faster but can still hold a conversation. You can tell that you are exercising at a vigorous intensity if you are breathing much harder and faster and cannot hold a conversation while exercising.  How can exercise benefit me?  Exercising regularly is important. It has many health benefits, such as:  Improving overall fitness, flexibility, and endurance.  Increasing bone density.  Helping with weight control.  Decreasing body fat.  Increasing muscle strength and endurance.  Reducing stress and tension, anxiety, depression, or anger.  Improving overall health.  What guidelines should I follow while exercising?  Before you start a new exercise program, talk with your health care provider.  Do not exercise so much that you hurt yourself, feel dizzy, or get very short of breath.  Wear comfortable clothes and wear shoes with good support.  Drink plenty of water while you exercise to prevent dehydration or heat stroke.  Work out until your breathing and your heartbeat get faster (moderate intensity).  How often should I exercise?  Choose an activity that you enjoy, and set realistic goals. Your health care provider can help you make an activity plan that is individually designed and works best for you.  Exercise regularly as told by your health care provider. This may include:  Doing strength training two times a week, such as:  Lifting weights.  Using resistance bands.  Push-ups.  Sit-ups.  Yoga.  Doing a certain intensity of exercise for a given amount of time. Choose from these options:  A total of 150 minutes of moderate-intensity exercise every week.  A total of 75 minutes of vigorous-intensity exercise every week.  A mix  of moderate-intensity and vigorous-intensity exercise every week.  Children, pregnant women, people who have not exercised regularly, people who are overweight, and older adults may need to talk with a health care provider about what activities are safe to perform. If you have a medical condition, be sure to talk with your health care provider before you start a new exercise program.  What are some exercise ideas?  Moderate-intensity exercise ideas include:  Walking 1 mile (1.6 km) in about 15 minutes.  Biking.  Hiking.  Golfing.  Dancing.  Water aerobics.  Vigorous-intensity exercise ideas include:  Walking 4.5 miles (7.2 km) or more in about 1 hour.  Jogging or running 5 miles (8 km) in about 1 hour.  Biking 10 miles (16.1 km) or more in about 1 hour.  Lap swimming.  Roller-skating or in-line skating.  Cross-country skiing.  Vigorous competitive sports, such as football, basketball, and soccer.  Jumping rope.  Aerobic dancing.  What are some everyday activities that can help me get exercise?  Yard work, such as:  Pushing a .  Raking and bagging leaves.  Washing your car.  Pushing a stroller.  Shoveling snow.  Gardening.  Washing windows or floors.  How can I be more active in my day-to-day activities?  Use stairs instead of an elevator.  Take a walk during your lunch break.  If you drive, park your car farther away from your work or school.  If you take public transportation, get off one stop early and walk the rest of the way.  Stand up or walk around during all of your indoor phone calls.  Get up, stretch, and walk around every 30 minutes throughout the day.  Enjoy exercise with a friend. Support to continue exercising will help you keep a regular routine of activity.  Where to find more information  You can find more information about exercising to stay healthy from:  [place] Department of Health and Human Services: www.hhs.gov  Centers for Disease Control and Prevention (CDC):  www.cdc.gov  Summary  Exercising regularly is important. It will improve your overall fitness, flexibility, and endurance.  Regular exercise will also improve your overall health. It can help you control your weight, reduce stress, and improve your bone density.  Do not exercise so much that you hurt yourself, feel dizzy, or get very short of breath.  Before you start a new exercise program, talk with your health care provider.  This information is not intended to replace advice given to you by your health care provider. Make sure you discuss any questions you have with your health care provider.  Document Revised: 04/15/2022 Document Reviewed: 04/15/2022  [employer] Patient Education © 2023 [employer] Fall Prevention in the Home, Adult  Falls can cause injuries and affect people of all ages. There are many simple things that you can do to make your home safe and to help prevent falls.  If you need it, ask for help making these changes.  What actions can I take to prevent falls?  General information  Use good lighting in all rooms. Make sure to:  Replace any light bulbs that burn out.  Turn on lights if it is dark and use night-lights.  Keep items that you use often in easy-to-reach places. Lower the shelves around your home if needed.  Move furniture so that there are clear paths around it.  Do not keep throw rugs or other things on the floor that can make you trip.  If any of your floors are uneven, fix them.  Add color or contrast paint or tape to clearly ankur and help you see:  Grab bars or handrails.  First and last steps of staircases.  Where the edge of each step is.  If you use a ladder or stepladder:  Make sure that it is fully opened. Do not climb a closed ladder.  Make sure the sides of the ladder are locked in place.  Have someone hold the ladder while you use it.  Know where your pets are as you move through your home.  What can I do in the bathroom?         Keep the floor dry. Clean up any water that  is on the floor right away.  Remove soap buildup in the bathtub or shower. Buildup makes bathtubs and showers slippery.  Use non-skid mats or decals on the floor of the bathtub or shower.  Attach bath mats securely with double-sided, non-slip rug tape.  If you need to sit down while you are in the shower, use a non-slip stool.  Install grab bars by the toilet and in the bathtub and shower. Do not use towel bars as grab bars.  What can I do in the bedroom?  Make sure that you have a light by your bed that is easy to reach.  Do not use any sheets or blankets on your bed that hang to the floor.  Have a firm bench or chair with side arms that you can use for support when you get dressed.  What can I do in the kitchen?  Clean up any spills right away.  If you need to reach something above you, use a sturdy step stool that has a grab bar.  Keep electrical cables out of the way.  Do not use floor polish or wax that makes floors slippery.  What can I do with my stairs?  Do not leave anything on the stairs.  Make sure that you have a light switch at the top and the bottom of the stairs. Have them installed if you do not have them.  Make sure that there are handrails on both sides of the stairs. Fix handrails that are broken or loose. Make sure that handrails are as long as the staircases.  Install non-slip stair treads on all stairs in your home if they do not have carpet.  Avoid having throw rugs at the top or bottom of stairs, or secure the rugs with carpet tape to prevent them from moving.  Choose a carpet design that does not hide the edge of steps on the stairs. Make sure that carpet is firmly attached to the stairs. Fix any carpet that is loose or worn.  What can I do on the outside of my home?  Use bright outdoor lighting.  Repair the edges of walkways and driveways and fix any cracks. Clear paths of anything that can make you trip, such as tools or rocks.  Add color or contrast paint or tape to clearly ankur and help  you see high doorway thresholds.  Trim any bushes or trees on the main path into your home.  Check that handrails are securely fastened and in good repair. Both sides of all steps should have handrails.  Install guardrails along the edges of any raised decks or porches.  Have leaves, snow, and ice cleared regularly. Use sand, salt, or ice melt on walkways during winter months if you live where there is ice and snow.  In the garage, clean up any spills right away, including grease or oil spills.  What other actions can I take?  Review your medicines with your health care provider. Some medicines can make you confused or feel dizzy. This can increase your chance of falling.  Wear closed-toe shoes that fit well and support your feet. Wear shoes that have rubber soles and low heels.  Use a cane, walker, scooter, or crutches that help you move around if needed.  Talk with your provider about other ways that you can decrease your risk of falls. This may include seeing a physical therapist to learn to do exercises to improve movement and strength.  Where to find more information  Centers for Disease Control and Prevention, [employer]: cdc.gov  National [place] on Aging: lan.nih.gov  National [place] on Aging: lan.nih.gov  Contact a health care provider if:  You are afraid of falling at home.  You feel weak, drowsy, or dizzy at home.  You fall at home.  Get help right away if you:  Lose consciousness or have trouble moving after a fall.  Have a fall that causes a head injury.  These symptoms may be an emergency. Get help right away. Call 911.  Do not wait to see if the symptoms will go away.  Do not drive yourself to the hospital.  This information is not intended to replace advice given to you by your health care provider. Make sure you discuss any questions you have with your health care provider.  Document Revised: 08/21/2023 Document Reviewed: 08/21/2023  Elsevier Patient Education © 2024 [employer]

## 2025-05-27 NOTE — PROGRESS NOTES
[facility]     Payal Nix  1945   [id number]      Patient Care Team:  Sindy Kruger MD as PCP - General  Sindy Kruger MD as PCP - Family Medicine  Bry Allen MD as Consulting Physician (Ophthalmology)    Chief Complaint   Patient presents with    6 mo f/u    Hypertension    Hyperlipidemia        Patient is a 80 y.o. female.   History of Present Illness  The patient presents for a 6-month follow-up.    She reports the presence of brown spots on her legs, which she attributes to sun damage. She also notes the occurrence of bruises, predominantly on her shins, but does not report any associated swelling. She has observed that her small veins tend to rupture easily. She has been reducing her aspirin intake to every other night. She recalls experiencing more leg swelling in the past than currently.    She experiences hemorrhoidal discomfort during prolonged periods of sitting, particularly when traveling. She has not had a new prescription for Proctofoam in a significant amount of time and is uncertain about its insurance coverage. She last used Proctofoam approximately a month ago. She does not frequently use Proctofoam.    She has lost approximately 30 pounds and currently weighs 132 pounds on her home scale. She expresses a desire to lose an additional 5 pounds. Her diet is primarily plant-based, with limited meat consumption. She occasionally consumes peanut butter snacks and chicken salad. She avoids red meat.    She has annual ophthalmology appointments and uses reading glasses. She has previously undergone cataract surgery.    She does not experience any gastrointestinal issues with alendronate. She believes she has experienced some bone loss as she perceives a decrease in her height.    She has expressed interest in having her A1c levels checked.    She is on metoprolol, lisinopril, and spironolactone for blood pressure management. She avoids salt in her diet.    She is  on pravastatin for cholesterol management.    She recently had a mammogram at [facility].    PAST SURGICAL HISTORY:  Cataract surgery         CHRONIC CONDITIONS      Past Medical History:   Diagnosis Date    Arthritis     Cellulitis 12/28/2022    Cervical cancer     Dr. Orr    Cervical cancer     in situ; Hysterectomy 1982    Chronic right-sided thoracic back pain 01/24/2019    Fatigue 01/24/2019    Fibrocystic breast     Hyperlipidemia     Hypertension     Menopausal state     Osteopenia     Overweight with body mass index (BMI) of 25 to 25.9 in adult 02/04/2021       Past Surgical History:   Procedure Laterality Date    BREAST CYST ASPIRATION      CATARACT EXTRACTION, BILATERAL Bilateral     2021    D & C WITH CERVICAL CONIZATION  1981    HYSTERECTOMY  1982    Ovaries intact. cervical cancer in situ       Family History   Problem Relation Age of Onset    Stroke Mother     Arthritis Mother     Tuberculosis Maternal Aunt     Tuberculosis Maternal Grandmother     Breast cancer Neg Hx     Endometrial cancer Neg Hx     Ovarian cancer Neg Hx        Social History     Socioeconomic History    Marital status:    Tobacco Use    Smoking status: Never    Smokeless tobacco: Never   Vaping Use    Vaping status: Never Used   Substance and Sexual Activity    Alcohol use: Yes     Alcohol/week: 1.0 standard drink of alcohol     Types: 1 Standard drinks or equivalent per week     Comment: monthly    Drug use: No    Sexual activity: Yes     Partners: Male     Birth control/protection: Post-menopausal       Allergies   Allergen Reactions    Amlodipine Besylate Other (See Comments)     edema  Other reaction(s): edema    Benazepril Hcl Swelling    Ciprofloxacin Other (See Comments)     Nausea and chills    Ciprofloxacin Hcl Other (See Comments)     Nausea and chills    Levofloxacin Nausea And Vomiting     Other reaction(s): nausea, vomiting    Lotrel [Amlodipine Besy-Benazepril Hcl] Swelling     unknown       Review of  "Systems:     Review of Systems    Vital Signs  Vitals:    05/27/25 0923   BP: 110/56   BP Location: Left arm   Patient Position: Sitting   Cuff Size: Adult   Pulse: 61   Temp: 97.7 °F (36.5 °C)   TempSrc: Infrared   SpO2: 100%   Weight: 60.1 kg (132 lb 6.4 oz)   Height: 160 cm (62.99\")   PainSc: 0-No pain     Body mass index is 23.46 kg/m².  BMI is within normal parameters. No other follow-up for BMI required.          Current Outpatient Medications:     alendronate (FOSAMAX) 70 MG tablet, Take 1 tablet by mouth Every 7 (Seven) Days., Disp: 15 tablet, Rfl: 1    aspirin 81 MG tablet, Take 1 tablet by mouth Daily., Disp: , Rfl:     azelastine (ASTELIN) 0.1 % nasal spray, 2 sprays into the nostril(s) as directed by provider 2 (Two) Times a Day. Use in each nostril as directed, Disp: 1 each, Rfl: 12    Calcium Citrate-Vitamin D 500-500 MG-UNIT chewable tablet, 1 po 2 times a day, Disp: , Rfl:     ipratropium (ATROVENT) 0.06 % nasal spray, 2 sprays into the nostril(s) as directed by provider 3 (Three) Times a Day., Disp: 1 each, Rfl: 5    lisinopril (PRINIVIL,ZESTRIL) 10 MG tablet, Take 1 tablet by mouth Every Night., Disp: 90 tablet, Rfl: 1    metoprolol succinate XL (TOPROL-XL) 100 MG 24 hr tablet, TAKE 1/2 (ONE-HALF) TABLET BY MOUTH IN THE MORNING AND 1 IN THE EVENING (Patient taking differently: Take 1 tablet by mouth Daily.), Disp: 135 tablet, Rfl: 0    pravastatin (PRAVACHOL) 20 MG tablet, Take 1 tablet by mouth Every Evening., Disp: 90 tablet, Rfl: 1    Proctofoam HC 1-1 % rectal foam, APPLY CREAM RECTALLY AS NEEDED, Disp: 10 g, Rfl: 0    spironolactone (ALDACTONE) 100 MG tablet, Take 1 tablet by mouth Daily., Disp: 90 tablet, Rfl: 1    vitamin B-12 (CYANOCOBALAMIN) 500 MCG tablet, Take 1 tablet by mouth Daily., Disp: , Rfl:     vitamin C (ASCORBIC ACID) 500 MG tablet, Take 1 tablet by mouth Daily. Once in a while, Disp: , Rfl:     Physical Exam:    Physical Exam  Vitals and nursing note reviewed. "   Constitutional:       Appearance: She is well-developed.   HENT:      Head: Normocephalic.   Eyes:      Conjunctiva/sclera: Conjunctivae normal.      Pupils: Pupils are equal, round, and reactive to light.   Neck:      Thyroid: No thyromegaly.   Cardiovascular:      Rate and Rhythm: Normal rate and regular rhythm.      Pulses: Normal pulses.      Heart sounds: Normal heart sounds.   Pulmonary:      Effort: Pulmonary effort is normal.      Breath sounds: Normal breath sounds. No wheezing.   Musculoskeletal:         General: Normal range of motion.      Cervical back: Normal range of motion and neck supple.      Right lower leg: No edema.      Left lower leg: No edema.   Lymphadenopathy:      Cervical: No cervical adenopathy.   Skin:     General: Skin is warm and dry.      Findings: Bruising present. No erythema, petechiae or rash.      Nails: There is no clubbing.      Comments: There are a couple of bruises on her right forearm and 1 small wound on her right lateral lower leg.  Multiple solar lentigos on the hands and forearm.  A lot of brown areas on the shins bilaterally some of which appear to be sun damage and some of which appear to be venous stasis changes.   Neurological:      Mental Status: She is alert and oriented to person, place, and time.   Psychiatric:         Attention and Perception: Attention normal.         Mood and Affect: Mood normal.         Thought Content: Thought content normal.         Cognition and Memory: Cognition normal.          ACE III MINI        Results Review:    None  Results         CMP:  Lab Results   Component Value Date    Glucose 94 11/26/2024    Glucose, UA Negative 03/04/2024    BUN 21 11/26/2024    BUN/Creatinine Ratio 17.8 11/26/2024    Creatinine 1.18 (H) 11/26/2024    Creatinine, Urine 88.1 03/04/2024    Ketones, UA Negative 03/04/2024    CO2 25.1 11/26/2024    Calcium 10.3 11/26/2024    Albumin 4.4 11/26/2024    AST (SGOT) 18 11/26/2024    ALT (SGPT) 13 11/26/2024  "    HbA1c:  No results found for: \"HGBA1C\"  Microalbumin:  Lab Results   Component Value Date    MICROALBUR 1.2 03/04/2024     Lipid Panel  Lab Results   Component Value Date    CHOL 162 11/26/2024    TRIG 95 11/26/2024    HDL 55 11/26/2024    LDL 90 11/26/2024    AST 18 11/26/2024    ALT 13 11/26/2024       Medication Review: Medications reviewed and noted  Patient Instructions   Problem List Items Addressed This Visit    None        No diagnosis found.  Assessment & Plan  Benign essential hypertension.  She will continue her current regimen of lisinopril in the evenings and metoprolol every morning. A low-salt diet will be maintained.    Hypercholesterolemia.  She will persist with her pravastatin regimen every evening. A low-fat, healthy diet will be continued, along with regular exercise and physical activity.    Venous stasis dermatitis.  The use of support hose that extend to the knee during long car or plane rides has been recommended. Sun protection measures will be continued.    Hypertensive retinopathy.  She will maintain good control of her blood pressure and continue annual visits to the ophthalmologist.    Easy bruising.  She may continue her aspirin regimen every other day or even every third day. Blood cell counts will be rechecked today.    Hemorrhoids.  She will verify the cost of Anusol HC cream before proceeding to the pharmacy to determine if it is more affordable through [employer]. The cream may be applied up to three times a day as needed.    Osteopenia.  Her lowest T-score is -2.2. A DEXA scan will be rechecked. She will continue her weekly alendronate regimen, along with calcium and vitamin D3 supplements. Regular weight-bearing exercises, such as walking, gardening, using small hand weights at home, and attending strength training classes, will be continued.    Health maintenance.  She will come back to see me in 6 months for annual wellness visit.         Plan of care reviewed with patient at " the conclusion of today's visit. Education was provided regarding diagnosis, management, and any prescribed or recommended OTC medications. Patient verbalizes understanding of and agreement with management plan.         05/27/25   09:25 EDT    Patient or patient representative verbalized consent for the use of Ambient Listening during the visit with  Sindy Kruger MD for chart documentation. 5/27/2025  10:10 EDT

## 2025-05-28 LAB
ALBUMIN UR-MCNC: <1.2 MG/DL
CREAT UR-MCNC: 109.1 MG/DL
MICROALBUMIN/CREAT UR: NORMAL MG/G{CREAT}

## 2025-05-29 ENCOUNTER — RESULTS FOLLOW-UP (OUTPATIENT)
Dept: INTERNAL MEDICINE | Facility: CLINIC | Age: 80
End: 2025-05-29
Payer: MEDICARE

## 2025-07-09 DIAGNOSIS — I10 BENIGN ESSENTIAL HYPERTENSION: ICD-10-CM

## 2025-07-09 RX ORDER — METOPROLOL SUCCINATE 100 MG/1
TABLET, EXTENDED RELEASE ORAL
Qty: 135 TABLET | Refills: 0 | Status: SHIPPED | OUTPATIENT
Start: 2025-07-09